# Patient Record
Sex: FEMALE | Race: BLACK OR AFRICAN AMERICAN | Employment: FULL TIME | URBAN - METROPOLITAN AREA
[De-identification: names, ages, dates, MRNs, and addresses within clinical notes are randomized per-mention and may not be internally consistent; named-entity substitution may affect disease eponyms.]

---

## 2021-08-24 ENCOUNTER — OFFICE VISIT (OUTPATIENT)
Dept: INTERNAL MEDICINE CLINIC | Age: 51
End: 2021-08-24
Payer: COMMERCIAL

## 2021-08-24 VITALS
SYSTOLIC BLOOD PRESSURE: 150 MMHG | TEMPERATURE: 96.5 F | HEART RATE: 89 BPM | DIASTOLIC BLOOD PRESSURE: 90 MMHG | RESPIRATION RATE: 20 BRPM | HEIGHT: 70 IN | BODY MASS INDEX: 41.95 KG/M2 | WEIGHT: 293 LBS | OXYGEN SATURATION: 97 %

## 2021-08-24 DIAGNOSIS — C50.912 MALIGNANT NEOPLASM OF LEFT BREAST IN FEMALE, ESTROGEN RECEPTOR NEGATIVE, UNSPECIFIED SITE OF BREAST (HCC): ICD-10-CM

## 2021-08-24 DIAGNOSIS — I10 ESSENTIAL HYPERTENSION: Primary | ICD-10-CM

## 2021-08-24 DIAGNOSIS — E66.01 MORBID OBESITY (HCC): ICD-10-CM

## 2021-08-24 DIAGNOSIS — Z17.1 MALIGNANT NEOPLASM OF LEFT BREAST IN FEMALE, ESTROGEN RECEPTOR NEGATIVE, UNSPECIFIED SITE OF BREAST (HCC): ICD-10-CM

## 2021-08-24 DIAGNOSIS — D56.9 THALASSEMIA, UNSPECIFIED TYPE: ICD-10-CM

## 2021-08-24 DIAGNOSIS — M79.7 FIBROMYALGIA: ICD-10-CM

## 2021-08-24 DIAGNOSIS — K58.1 IRRITABLE BOWEL SYNDROME WITH CONSTIPATION: ICD-10-CM

## 2021-08-24 DIAGNOSIS — K22.4 ESOPHAGEAL SPASM: ICD-10-CM

## 2021-08-24 DIAGNOSIS — M15.9 PRIMARY OSTEOARTHRITIS INVOLVING MULTIPLE JOINTS: ICD-10-CM

## 2021-08-24 PROCEDURE — 99204 OFFICE O/P NEW MOD 45 MIN: CPT | Performed by: INTERNAL MEDICINE

## 2021-08-24 RX ORDER — LANOLIN ALCOHOL/MO/W.PET/CERES
400 CREAM (GRAM) TOPICAL DAILY
COMMUNITY

## 2021-08-24 RX ORDER — CLOTRIMAZOLE AND BETAMETHASONE DIPROPIONATE 10; .64 MG/G; MG/G
CREAM TOPICAL AS NEEDED
COMMUNITY
End: 2022-10-05 | Stop reason: ALTCHOICE

## 2021-08-24 RX ORDER — LOSARTAN POTASSIUM AND HYDROCHLOROTHIAZIDE 12.5; 1 MG/1; MG/1
TABLET ORAL
COMMUNITY
End: 2022-04-04 | Stop reason: SDUPTHER

## 2021-08-24 RX ORDER — DULOXETIN HYDROCHLORIDE 60 MG/1
CAPSULE, DELAYED RELEASE ORAL
COMMUNITY
End: 2022-04-04 | Stop reason: SDUPTHER

## 2021-08-24 RX ORDER — CHOLECALCIFEROL TAB 125 MCG (5000 UNIT) 125 MCG
5000 TAB ORAL DAILY
COMMUNITY

## 2021-08-24 RX ORDER — DIAPER,BRIEF,INFANT-TODD,DISP
1 EACH MISCELLANEOUS DAILY
COMMUNITY

## 2021-08-24 RX ORDER — MULTIVITAMIN
1 CAPSULE ORAL DAILY
COMMUNITY

## 2021-08-24 RX ORDER — PROPYLENE GLYCOL 0.06 MG/ML
1 EMULSION OPHTHALMIC
COMMUNITY

## 2021-08-24 RX ORDER — MONTELUKAST SODIUM 10 MG/1
TABLET ORAL
COMMUNITY
End: 2022-02-03

## 2021-08-24 RX ORDER — LANOLIN ALCOHOL/MO/W.PET/CERES
100 CREAM (GRAM) TOPICAL DAILY
COMMUNITY

## 2021-08-24 RX ORDER — LORATADINE 10 MG/1
10 TABLET ORAL DAILY
COMMUNITY
End: 2022-10-05 | Stop reason: ALTCHOICE

## 2021-08-24 RX ORDER — AZELASTINE HYDROCHLORIDE, FLUTICASONE PROPIONATE 137; 50 UG/1; UG/1
1 SPRAY, METERED NASAL 2 TIMES DAILY
COMMUNITY
End: 2022-02-03

## 2021-08-24 RX ORDER — DOCUSATE SODIUM 100 MG/1
100 CAPSULE, LIQUID FILLED ORAL 2 TIMES DAILY
COMMUNITY
End: 2022-02-01 | Stop reason: ALTCHOICE

## 2021-08-24 NOTE — PROGRESS NOTES
1. Have you been to the ER, urgent care clinic since your last visit? Hospitalized since your last visit? No    2. Have you seen or consulted any other health care providers outside of the [address] [place] [address] since your last visit? Include any pap smears or colon screening. Yes    Health Maintenance Due   Topic Date Due    Hepatitis C Screening  Never done    PAP AKA CERVICAL CYTOLOGY  Never done    Lipid Screen  Never done    Colorectal Cancer Screening Combo  Never done    COVID-19 Vaccine (2 - Moderna 2-dose series) 04/16/2021     Previous PCP Dr. Gonzales [place], [place].

## 2021-08-24 NOTE — PROGRESS NOTES
HISTORY OF PRESENT ILLNESS  Maryan Guzman is a 53 y.o. female. HPI  New patient to our practice. Rashaun Mccoy's sister, Payal Mccoy's daughter. Zakiya Hebert - tends to have a mild anemia secondary to this. GERD and IBS - in 2010 had lap band surgery. Also had esophageal spasm - made the spasms worse. Has deflated the band but still with spasms and occ vomiting. Has an appointment for GI f/u 9/20 in [place]. Recently had episode when drinking water felt esophagus closed and vomited everywhere. previously had pH testing and motility testing which showed spasms. Fibromyalgia, arthritis, tennis elbow - had bone scan with breast cancer w/u. Showed arthritis in all joints. Has spinal stenosis in lumbar spine and SI joint pain, bursitis in right hip. Will take Excedrine prn. Stretching daily. Fibromyalgia diagnosed in 2017 - saw rheumatologist who stated she did not have RA. Placed on Cymbalta which has helped. Had tried gabapentin in the past but did  Not help and made her lethargic.     htn - elevated today. Does not check at home. Diagnosed 2003/2004. Tries to limit her salt intake. Doesn't take her meds every day. diverticulae with diverticultitis - last episode was 2018. Last colonoscopy was in 2017. Heart murmur - first id'ed 2 years ago. Had ECHO at that time at [facility] in [place]. Breast cancer - last treatment 4/2017,  Had surgery then chemo then radiation. Left breast triple negative. Last Mammogram was in December. Has been seeing an oncologist in [place] but hasn't established with anyone here - would like to continue seeing her specialists in [place]. Had been seeing med oncologist, surg oncologist and radiation oncologist.  No fam hx of breast cancer. Has resultant lymphedema in left arm. Saw Lymphedema clinic in [place].  Also mild in fir foot and ankle after previous ankle reconstruction. 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       Had thickened endometrial lining last year. Saw Gyn/Onc, had d/c with biopsy which was negative. Past Medical History:   Diagnosis Date    Hypertension      Past Surgical History:   Procedure Laterality Date    HX BARIATRIC SURGERY  10/2010    lap band    HX BREAST LUMPECTOMY Left 07/2016    HX DILATION AND CURETTAGE  11/2020    HX ORTHOPAEDIC  10/2014    right foot and ankle reconstruction for flat feet    HX ORTHOPAEDIC Bilateral 2008    knee arthroscopy    HX ORTHOPAEDIC Left 2006    heal spur surgery    HX TONSIL AND ADENOIDECTOMY       Allergies   Allergen Reactions    Other Food Sneezing    Adhesive Other (comments)     Tears skin off. Severe.  Oxycodone Itching    Lisinopril Other (comments)     \"constant tickle in my throat\"  \"constant tickle in my throat\"      Mold Other (comments)    Nickel Other (comments)    Sulfa (Sulfonamide Antibiotics) Other (comments)    Sulfamethoxazole-Trimethoprim Hives    Anesthetics - Amide Type - Select Amino Amides Nausea Only       Current Outpatient Medications:     propylene glycoL (Systane Balance) 0.6 % drop, Apply 1 Drop to eye as needed. , Disp: , Rfl:     biotin 10,000 mcg cap, Take 1 Tablet by mouth daily. , Disp: , Rfl:     cholecalciferol (VITAMIN D3) (5000 Units/125 mcg) tab tablet, Take 5,000 Units by mouth daily. , Disp: , Rfl:     clotrimazole-betamethasone (LOTRISONE) topical cream, Apply  to affected area as needed. , Disp: , Rfl:     cyanocobalamin 1,000 mcg tablet, Take 100 mcg by mouth daily. , Disp: , Rfl:     Dextrin 3 gram/3.5 gram powd, Take  by mouth as needed. , Disp: , Rfl:     docusate sodium (COLACE) 100 mg capsule, Take 100 mg by mouth two (2) times a day., Disp: , Rfl:     DULoxetine (CYMBALTA) 60 mg capsule, duloxetine 60 mg capsule,delayed release  Take 1 capsule every day by oral route., Disp: , Rfl:     folic acid 895 mcg tablet, Take 400 mcg by mouth daily. , Disp: , Rfl:     loratadine (CLARITIN) 10 mg tablet, Take 10 mg by mouth., Disp: , Rfl:     losartan-hydroCHLOROthiazide (HYZAAR) 100-12.5 mg per tablet, losartan 100 mg-hydrochlorothiazide 12.5 mg tablet  Take 1 tablet every day by oral route., Disp: , Rfl:     montelukast (SINGULAIR) 10 mg tablet, montelukast 10 mg tablet  TAKE 1 TABLET BY MOUTH EVERY DAY AT NIGHT, Disp: , Rfl:     multivitamin capsule, Take 1 Capsule by mouth daily. , Disp: , Rfl:     azelastine-fluticasone (Dymista) 137-50 mcg/spray spry, 1 Spray by Nasal route two (2) times a day., Disp: , Rfl:   Family History   Problem Relation Age of Onset    Arthritis-osteo Mother     Heart Disease Father         chf    Cancer Father         lung    Hypertension Father     Arthritis-rheumatoid Brother     Hypertension Brother     Hypertension Brother     Elevated Lipids Brother     Arthritis-osteo Brother         Social hx reviewed and updated in chart  Review of Systems   Constitutional: Negative. HENT: Negative. Eyes: Negative. Respiratory: Negative. Cardiovascular: Negative. Gastrointestinal: Positive for abdominal pain, constipation, heartburn, nausea and vomiting. Negative for blood in stool, diarrhea and melena. Esoph spasms   Genitourinary: Negative. Musculoskeletal: Positive for back pain and joint pain. Negative for falls, myalgias and neck pain. Skin: Negative. Neurological: Positive for tingling (feet post chemo). Negative for dizziness, tremors, sensory change, speech change, focal weakness, seizures, weakness and headaches. Endo/Heme/Allergies: Positive for environmental allergies. Negative for polydipsia. Does not bruise/bleed easily. Psychiatric/Behavioral: Negative. Visit Vitals  BP (!) 150/90   Pulse 89   Temp (!) 96.5 °F (35.8 °C) (Temporal)   Resp 20   Ht 5' 9.5\" (1.765 m)   Wt 341 lb (154.7 kg)   SpO2 97%   BMI 49.63 kg/m²     Physical Exam  Constitutional:       Appearance: Normal appearance. She is obese.    HENT:      Head: Normocephalic and atraumatic. Eyes:      Extraocular Movements: Extraocular movements intact. Conjunctiva/sclera: Conjunctivae normal.      Pupils: Pupils are equal, round, and reactive to light. Neck:      Vascular: No carotid bruit. Comments: No thyromegaly  Cardiovascular:      Rate and Rhythm: Normal rate and regular rhythm. Pulses: Normal pulses. Heart sounds: Normal heart sounds. Pulmonary:      Effort: Pulmonary effort is normal.      Breath sounds: Normal breath sounds. Abdominal:      General: Bowel sounds are normal. There is no distension. Palpations: Abdomen is soft. There is no mass. Tenderness: There is no abdominal tenderness. Musculoskeletal:         General: No tenderness. Normal range of motion. Cervical back: Neck supple. Right lower leg: Edema (right ankle and foot) present. Left lower leg: No edema. Comments: Mild swelling left arm   Lymphadenopathy:      Cervical: No cervical adenopathy. Skin:     General: Skin is warm and dry. Findings: No rash. Neurological:      General: No focal deficit present. Mental Status: She is alert and oriented to person, place, and time. Psychiatric:         Mood and Affect: Mood normal.         Behavior: Behavior normal.         Thought Content: Thought content normal.         ASSESSMENT and PLAN  New patient with to or practice. Diagnoses and all orders for this visit:    1. Essential hypertension - elevated today. Will have her check BP at home and f/u in 5 weeks once she moves to [place]. Improve diet, decrease salt, increase exercise    2. Thalassemia, unspecified type - mild anemia per patient. 3. Esophageal spasm - has upcoing eval with GI.      4. Irritable bowel syndrome with constipation - constipation controlled with current medications.       5. Malignant neoplasm of left breast in female, estrogen receptor negative, unspecified site of breast ([facility] [address] - close to 5 years from last treatment. Will continue to follow up with oncology team in [place]    6. Fibromyalgia - continue Cymbalta and stretchng    7. Primary osteoarthritis involving multiple joints - dscussed weight loss for improvement    8. Morbid obesity - discussed diet and exercise for weight loss. Will obtain records from previous PCP. Follow-up and Dispositions    · Return in about 6 weeks (around 10/5/2021) for htn.

## 2021-10-06 ENCOUNTER — OFFICE VISIT (OUTPATIENT)
Dept: INTERNAL MEDICINE CLINIC | Age: 51
End: 2021-10-06
Payer: COMMERCIAL

## 2021-10-06 VITALS
WEIGHT: 293 LBS | DIASTOLIC BLOOD PRESSURE: 91 MMHG | BODY MASS INDEX: 41.95 KG/M2 | TEMPERATURE: 98.3 F | OXYGEN SATURATION: 98 % | HEART RATE: 85 BPM | SYSTOLIC BLOOD PRESSURE: 148 MMHG | RESPIRATION RATE: 14 BRPM | HEIGHT: 70 IN

## 2021-10-06 DIAGNOSIS — D22.9 ATYPICAL MOLE: ICD-10-CM

## 2021-10-06 DIAGNOSIS — J30.89 NON-SEASONAL ALLERGIC RHINITIS, UNSPECIFIED TRIGGER: ICD-10-CM

## 2021-10-06 DIAGNOSIS — C50.912 MALIGNANT NEOPLASM OF LEFT BREAST IN FEMALE, ESTROGEN RECEPTOR NEGATIVE, UNSPECIFIED SITE OF BREAST (HCC): ICD-10-CM

## 2021-10-06 DIAGNOSIS — Z17.1 MALIGNANT NEOPLASM OF LEFT BREAST IN FEMALE, ESTROGEN RECEPTOR NEGATIVE, UNSPECIFIED SITE OF BREAST (HCC): ICD-10-CM

## 2021-10-06 DIAGNOSIS — M48.061 SPINAL STENOSIS OF LUMBAR REGION, UNSPECIFIED WHETHER NEUROGENIC CLAUDICATION PRESENT: ICD-10-CM

## 2021-10-06 DIAGNOSIS — K58.1 IRRITABLE BOWEL SYNDROME WITH CONSTIPATION: ICD-10-CM

## 2021-10-06 DIAGNOSIS — K22.4 ESOPHAGEAL SPASM: ICD-10-CM

## 2021-10-06 DIAGNOSIS — I10 ESSENTIAL HYPERTENSION: Primary | ICD-10-CM

## 2021-10-06 PROCEDURE — 99214 OFFICE O/P EST MOD 30 MIN: CPT | Performed by: INTERNAL MEDICINE

## 2021-10-06 NOTE — PROGRESS NOTES
Reviewed record in preparation for visit and have obtained necessary documentation. Identified pt with two pt identifiers(name and ). Chief Complaint   Patient presents with    Follow-up     Blood pressure (!) 148/91, pulse 85, temperature 98.3 °F (36.8 °C), temperature source Temporal, resp. rate 14, height 5' 9.5\" (1.765 m), weight 338 lb 3.2 oz (153.4 kg), SpO2 98 %. Health Maintenance Due   Topic Date Due    Hepatitis C Test  Never done    Cervical cancer screen  Never done    Mammogram  Never done    Cholesterol Test   Never done    Colorectal Screening  Never done    Yearly Flu Vaccine (1) 2021    Shingles Vaccine (1 of 2) Never done       Ms. Taco Rich has a reminder for a \"due or due soon\" health maintenance. I have asked that she discuss this further with her primary care provider for follow-up on this health maintenance. Coordination of Care Questionnaire:  :     1) Have you been to an emergency room, urgent care clinic since your last visit? no   Hospitalized since your last visit? no             2) Have you seen or consulted any other health care providers outside of [address] [place] [address] since your last visit? no  (Include any pap smears or colon screenings in this section.)    3) In the event something were to happen to you and you were unable to speak on your behalf, do you have an Advance Directive/ Living Will in place stating your wishes?  NO

## 2021-10-06 NOTE — PROGRESS NOTES
Subjective:     Tre Mckee is a 49 y.o. female who presents for follow up of hypertension. Seen as new patient last month. BP elevated but was new patient visit. Was to work on diet and exercise and monitor BP at home. Missing doses of medication. Missed Monday and Tuesday, took today approx 1 hour prior to coming to the appointment. Blames moving. Diet and Lifestyle: not watching diet and not exercising regularly. Home BP Monitoring: is not measured at home    Cardiovascular ROS: taking medications as instructed, no medication side effects noted, no TIA's, no chest pain on exertion, no dyspnea on exertion, no swelling of ankles, no orthostatic dizziness or lightheadedness, no palpitations. New concerns:   Did not take her Excedrine today but joints are inflamed with increased pain. Given allergy serum by allergies. Needs to establish with allergist.  Has RN willing to admin until sees new allergies. Was to have another motility study prior to moving from . Needs to establish here. Had been seeing ortho for injections for her lumbar spinal stenosis and SI joint issues. Needs to establish here. Hx of breast cancer - needs to establish with med oncologist.      Current Outpatient Medications   Medication Sig Dispense Refill    syringe with needle 1 mL 22 gauge x 1 1/2\" syrg 1 Each by Does Not Apply route every seven (7) days. For allergy injections 15 Each 0    propylene glycoL (Systane Balance) 0.6 % drop Apply 1 Drop to eye as needed.  biotin 10,000 mcg cap Take 1 Tablet by mouth daily.  cholecalciferol (VITAMIN D3) (5000 Units/125 mcg) tab tablet Take 5,000 Units by mouth daily.  clotrimazole-betamethasone (LOTRISONE) topical cream Apply  to affected area as needed.  cyanocobalamin 1,000 mcg tablet Take 100 mcg by mouth daily.  Dextrin 3 gram/3.5 gram powd Take  by mouth as needed.       docusate sodium (COLACE) 100 mg capsule Take 100 mg by mouth two (2) times a day.  DULoxetine (CYMBALTA) 60 mg capsule duloxetine 60 mg capsule,delayed release   Take 1 capsule every day by oral route.  folic acid 675 mcg tablet Take 400 mcg by mouth daily.  loratadine (CLARITIN) 10 mg tablet Take 10 mg by mouth.  losartan-hydroCHLOROthiazide (HYZAAR) 100-12.5 mg per tablet losartan 100 mg-hydrochlorothiazide 12.5 mg tablet   Take 1 tablet every day by oral route.  montelukast (SINGULAIR) 10 mg tablet montelukast 10 mg tablet   TAKE 1 TABLET BY MOUTH EVERY DAY AT NIGHT      multivitamin capsule Take 1 Capsule by mouth daily.  azelastine-fluticasone (Dymista) 137-50 mcg/spray spry 1 Spray by Nasal route two (2) times a day. No current lab results    Review of Systems, additional:  Pertinent items are noted in HPI. Objective:     Visit Vitals  BP (!) 148/91 (BP 1 Location: Right arm, BP Patient Position: Sitting, BP Cuff Size: Thigh)   Pulse 85   Temp 98.3 °F (36.8 °C) (Temporal)   Resp 14   Ht 5' 9.5\" (1.765 m)   Wt 338 lb 3.2 oz (153.4 kg)   SpO2 98%   BMI 49.23 kg/m²     Appearance: alert, well appearing, and in no distress and oriented to person, place, and time. General exam:   . NECK: supple, no lad, no bruit, no tm  LUNGS: cta bilat  CV rrr, no m/g/r  ABD: soft, nt, nd, nabs  EXT: no c/c/e      Assessment/Plan:     Diagnoses and all orders for this visit:    1. Essential hypertension - cannot assess effectiveness of medications as has missed several doses. Discussed techniques to remember meds. Continue same medicaitons for now. 2. Non-seasonal allergic rhinitis, unspecified trigger - prev allergist ok with RN friend administering immunotx until establishes with allergist in [facility]   -     syringe with needle 1 mL 22 gauge x 1 1/2\" syrg; 1 Each by Does Not Apply route every seven (7) days. For allergy injections    3.  Esophageal  Spasm - middle of work up with GI in [place].    -     REFERRAL TO GASTROENTEROLOGY    4. Irritable bowel syndrome with constipation  -     REFERRAL TO GASTROENTEROLOGY    5. Malignant neoplasm of left breast in female, estrogen receptor negative, unspecified site of breast ([facility] [address] -     REFERRAL TO HEMATOLOGY ONCOLOGY    6. Spinal stenosis of lumbar region, unspecified whether neurogenic claudication present - has had SI joint injections in past  -     REFERRAL TO ORTHOPEDICS    7. Atypical mole  -     REFERRAL TO DERMATOLOGY            Follow-up and Dispositions    · Return in about 4 weeks (around 11/3/2021) for htn.

## 2021-10-07 ENCOUNTER — TELEPHONE (OUTPATIENT)
Dept: INTERNAL MEDICINE CLINIC | Age: 51
End: 2021-10-07

## 2021-10-20 ENCOUNTER — OFFICE VISIT (OUTPATIENT)
Dept: ONCOLOGY | Age: 51
End: 2021-10-20
Payer: COMMERCIAL

## 2021-10-20 ENCOUNTER — DOCUMENTATION ONLY (OUTPATIENT)
Dept: ONCOLOGY | Age: 51
End: 2021-10-20

## 2021-10-20 VITALS
HEART RATE: 89 BPM | HEIGHT: 69 IN | BODY MASS INDEX: 43.4 KG/M2 | WEIGHT: 293 LBS | SYSTOLIC BLOOD PRESSURE: 153 MMHG | TEMPERATURE: 96.2 F | OXYGEN SATURATION: 97 % | DIASTOLIC BLOOD PRESSURE: 87 MMHG

## 2021-10-20 DIAGNOSIS — R79.89 LOW VITAMIN D LEVEL: ICD-10-CM

## 2021-10-20 DIAGNOSIS — D56.9 THALASSEMIA, UNSPECIFIED TYPE: ICD-10-CM

## 2021-10-20 DIAGNOSIS — M79.7 FIBROMYALGIA: ICD-10-CM

## 2021-10-20 DIAGNOSIS — Z85.3 HISTORY OF BREAST CANCER: Primary | ICD-10-CM

## 2021-10-20 DIAGNOSIS — E78.9 BORDERLINE HIGH CHOLESTEROL: ICD-10-CM

## 2021-10-20 DIAGNOSIS — I10 PRIMARY HYPERTENSION: ICD-10-CM

## 2021-10-20 DIAGNOSIS — K57.90 DIVERTICULOSIS: ICD-10-CM

## 2021-10-20 DIAGNOSIS — Z92.3 HISTORY OF THERAPEUTIC RADIATION: ICD-10-CM

## 2021-10-20 DIAGNOSIS — Z98.890 HISTORY OF LUMPECTOMY OF LEFT BREAST: ICD-10-CM

## 2021-10-20 PROCEDURE — 99204 OFFICE O/P NEW MOD 45 MIN: CPT | Performed by: INTERNAL MEDICINE

## 2021-10-20 NOTE — PROGRESS NOTES
Oncology Social Work  Psychosocial Assessment    Reason for Assessment:      [] Social Work Referral [x] Initial Assessment [] New Diagnosis [] Other    Advance Care Planning:  Patient would like to update her AMD.      Sources of Information:    [x]Patient  []Family  []Staff  []Medical Record     Mental Status:    [x]Alert  []Lethargic  []Unresponsive   [] Unable to assess   Oriented to:  [x]Person  [x]Place  [x]Time  [x]Situation      Relationship Status:  [x]Single  []  []Significant Other/Life Partner  []  []  []    Living Circumstances:  [x]Lives Alone  []Family/Significant Other in Household  []Roommates  []Children in the Home  []Paid Caregivers  []Assisted Living Facility/Group Home  []Skilled [address]  []Homeless  []Incarcerated  []Environmental/Care Concerns  []Other:    Employment Status:  [x]Employed Full-time []Employed Part-time []Homemaker  [] Retired [] Short-Term Disability [] [facility]  [] Unemployed     Barriers to Learning:    []Language  []Developmental  []Cognitive  []Altered Mental Status  []Visual/Hearing Impairment  []Unable to Read/Write  []Motivational  [] Challenges Understanding Medical Jargon [x]No Barriers Identified      Financial/Legal Concerns:    []Uninsured  []Limited Income/Resources  []Non-Citizen  []Food Insecurity [x]No Concerns Identified   []Other:    Oriental orthodox/Spiritual/Existential:  Does patient have any spiritual or Sabianism beliefs? [] Yes [] No  Is the patient involved in a spiritual, eloise or Sabianism community? [] Yes [] No  Patient expressing spiritual/existential angst? [] Yes [x] No  Notes: Patient did not express any spiritual or Sabianism preferences at this time.       Support System:    Identified Support Person/Group:  [x]Strong  []Fair  []Limited    Coping with Illness:   [x]  Coping Well  [] Challenges Coping with Serious Illness [] Situational Depression [] Situational Anxiety [] Anticipatory Grief  [] Recent Loss [] Caregiver Arley            Narrative:   Met with the patient during her initial office visit today. Patient reports a history of triple negative breast cancer, with lumpectomy, AC-T chemotherapy, and XRT in 2016. Patient has a PCP - Dr. Donna Rivas. Living Situation - The patient lives alone with her two cats, \"Heena Álvarez\" and \"Tamera. \"  She recently relocated from [place], to be closer to family. Employment Status - The patient is an . 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 Insurance -  [facility] [employer] PPO    Social Support - The patient's Mom, siblings, nieces, and nephews all live in [place], [place] and are supportive. Resources provided -  The patient suffers from lymphedema, and plans to connect with the [facility]. The patient was seeing a [place] [address] provider in [place], and plans to connect with this type of specialist in [place], [place]. The patient would like to update her AMD, and complete Financial POA paperwork as well. Provided information for [facility], explaining that they offer legal and financial services to cancer patients that meet financial eligibility. Sent a referral to [facility] on the patient's behalf, with her permission. Provided this 's contact information as well as information regarding the complementary services provided by the [facility], explaining that the center is currently closed due to COVID-19 restrictions. Explained that meditation, yoga, relationship counseling, and music therapy, are being offered virtually. Plan:   1. Introduced self and role of this  in the [employer]. 2.  Informed the patient of the [facility] and available resources there. 3.  Continue to meet with the patient when she returns to the clinic for ongoing assessment of the patients adjustment to her diagnosis and treatment.     4.  Ongoing psychosocial support as desired by patient.       Referral:   Complementary therapies referral  Legal referral  Lymphedema referral  Orin Whitehead LCSW

## 2021-10-20 NOTE — PROGRESS NOTES
[facility] [place] [address] Debbie Estescent, [address] [place] [address] Road, [facility] 66: [phone]  F: [phone]    Reason for Visit:   Becca Spence is a 49 y.o. female who is seen in consultation at the request of Dr. Rodney Short  for evaluation of history of breast cancer. Treatment History:   Breast biopsy  Lumpectomy 2016      STAGE: 3 by report triple negative    History of Present Illness:     Pt seen today for office consult for triple breast cancer dx 2016 in [place] at [facility]. Hx of lump/ AC-T chemo / XRT. No records here. Need records. Last mammo 12/20 at [facility] [place] and good. Hx of diverticulitis/ HTN/ fibromyalgia/ IBS/ spinal stenosis. Hx of bone scan / spine imaging. Has lymphedema and uses pump. No cancer recurrence. Pt works as . From [employer] and moved back here.    No fevers/ chills/ chest pain/ SOB/ nausea/ vomiting/diarrhea/         Past Medical History:   Diagnosis Date    Breast cancer ([facility]     Diverticulitis     Diverticulosis     Esophageal spasm     Fibromyalgia     Hypertension     IBS (irritable bowel syndrome)     Morbid obesity ([facility]     Osteoarthritis     Thalassemia       Past Surgical History:   Procedure Laterality Date    HX BARIATRIC SURGERY  10/2010    lap band    HX BREAST LUMPECTOMY Left 07/2016    HX DILATION AND CURETTAGE  11/2020    HX ORTHOPAEDIC  10/2014    right foot and ankle reconstruction for flat feet    HX ORTHOPAEDIC Bilateral 2008    knee arthroscopy    HX ORTHOPAEDIC Left 2006    heal spur surgery    HX TONSIL AND ADENOIDECTOMY        Social History     Tobacco Use    Smoking status: Never Smoker    Smokeless tobacco: Never Used   Substance Use Topics    Alcohol use: Yes     Comment: 1 time a year      Family History   Problem Relation Age of Onset    Arthritis-osteo Mother     Heart Disease Father         chf    Cancer Father         lung    Hypertension Father     Arthritis-rheumatoid Brother     Hypertension Brother     Hypertension Brother     Elevated Lipids Brother     Arthritis-osteo Brother      Current Outpatient Medications   Medication Sig    OTHER daily. Black Cohosh    syringe with needle 1 mL 22 gauge x 1 1/2\" syrg 1 Each by Does Not Apply route every seven (7) days. For allergy injections    propylene glycoL (Systane Balance) 0.6 % drop Apply 1 Drop to eye as needed.  biotin 10,000 mcg cap Take 1 Tablet by mouth daily.  cholecalciferol (VITAMIN D3) (5000 Units/125 mcg) tab tablet Take 5,000 Units by mouth daily.  clotrimazole-betamethasone (LOTRISONE) topical cream Apply  to affected area as needed.  cyanocobalamin 1,000 mcg tablet Take 100 mcg by mouth daily.  Dextrin 3 gram/3.5 gram powd Take  by mouth as needed.  docusate sodium (COLACE) 100 mg capsule Take 100 mg by mouth two (2) times a day.  DULoxetine (CYMBALTA) 60 mg capsule duloxetine 60 mg capsule,delayed release   Take 1 capsule every day by oral route.  folic acid 345 mcg tablet Take 400 mcg by mouth daily.  loratadine (CLARITIN) 10 mg tablet Take 10 mg by mouth.  losartan-hydroCHLOROthiazide (HYZAAR) 100-12.5 mg per tablet losartan 100 mg-hydrochlorothiazide 12.5 mg tablet   Take 1 tablet every day by oral route.  montelukast (SINGULAIR) 10 mg tablet montelukast 10 mg tablet   TAKE 1 TABLET BY MOUTH EVERY DAY AT NIGHT    multivitamin capsule Take 1 Capsule by mouth daily.  azelastine-fluticasone (Dymista) 137-50 mcg/spray spry 1 Spray by Nasal route two (2) times a day. No current facility-administered medications for this visit. Allergies   Allergen Reactions    Other Food Sneezing    Adhesive Other (comments)     Tears skin off. Severe.       Oxycodone Itching    Lisinopril Other (comments)     \"constant tickle in my throat\"  \"constant tickle in my throat\"      Mold Other (comments)    Nickel Other (comments)    Sulfa (Sulfonamide Antibiotics) Other (comments)    Sulfamethoxazole-Trimethoprim Hives    Anesthetics - Amide Type - Select Amino Amides Nausea Only        A complete review of systems was obtained, negative except as described above and as reported on ROS sheet scanned into system. Physical Exam:     Visit Vitals  BP (!) 153/87 (BP 1 Location: Right arm, BP Patient Position: Sitting)   Pulse 89   Temp (!) 96.2 °F (35.7 °C) (Temporal)   Ht 5' 9\" (1.753 m)   Wt 338 lb 6.4 oz (153.5 kg)   SpO2 97%   BMI 49.97 kg/m²     ECOG PS: 0  General: No distress  Eyes: PERRLA, anicteric sclerae  HENT: Atraumatic, wearing mask  Neck: Supple  Respiratory: CTAB, normal respiratory effort  CV: Normal rate, regular rhythm, no murmurs, no peripheral edema  GI: Soft, nontender, nondistended, no masses  MS: Normal gait and station. Digits without clubbing or cyanosis. Skin: No rashes, ecchymoses, or petechiae. Normal temperature, turgor, and texture. Psych: Alert, oriented, appropriate affect, normal judgment/insight  Neuro non focal  Breast LEFT lumpectomy scar, no masses palpable b/l     Results:   No results found for: WBC, HGB, HCT, PLT, MCV, ANEU, HGBPOC, HCTPOC, HGBEXT, HCTEXT, PLTEXT, HGBEXT, HCTEXT, PLTEXT  No results found for: NA, K, CL, CO2, GLU, BUN, CREA, GFRAA, GFRNA, CA, NAPOC, KPOCT, CLPOC, GLUCPOC, IBUN, CREAPOC, ICAI  No results found for: TBILI, ALT, AP, TP, ALB, GLOB      Records reviewed and summarized above. Pathology report(s) reviewed above. Radiology report(s) reviewed above. Assessment:/PLAN     1)  Reported stage 3 LEFT breast cancer triple negative dx in 2016  hx of lump/ chemo AC-T/ radiation in [place]   history reviewed with pt today. Need records from [place].   Will get. Dx in 2016. Not on any therapy now. Doing well without cancer recurrence. Last mammo 12/20 in [place]. Ordered for this year. Has not had labs recently and ordered these. Hx of lymphedema and will refer to LE clinic.    Needs referral to GYN and this done today.   Will continue course of surveillance. Discussed body scans and pt reports having bone scan in past negative. SW support today. 2) post menopausal.  amenorrhic since chemo but still having some vaginal bleeding. Hx of D+C/ fu GYN. Needs referral and ordered today. 3) Hx of diverticulitis/ HTN/ fibromyalgia/ IBS/ spinal stenosis. Hx of bone scan / spine imaging. Per PCP. 4) hot flashes. Taking black cohosh which is helping. 5) lymphedema. Has seen LE clinic in past and will refer here. 6) hx thalassemia. Hx of anemia. Check labs. 7) Psychosocial.  Mood good. Coping well. Works as an . Just moved back to [facility]. Has family support here. SW support today. Fu here in 6 months  Call if questions    I appreciate the opportunity to participate in Ms. Sergio Mccoy's care.     Signed By: Sandra Worthington, DO

## 2021-10-20 NOTE — PROGRESS NOTES
Addie Jimenez is a 49 y.o. female  Chief Complaint   Patient presents with    New Patient    Breast Cancer     1. Have you been to the ER, urgent care clinic since your last visit? Hospitalized since your last visit? No.  2. Have you seen or consulted any other health care providers outside of the [address] [place] [address] since your last visit? Include any pap smears or colon screening.  Yes, patient went to see Gurvinder Perez (Medical Oncologist) and PCP Jasmina Vera

## 2021-10-25 NOTE — PROGRESS NOTES
New Patient, Referred by Dr. Severa Rumpf for PMB, pt stats she is having back pain that is an 8/10 on pain scale, she states pain is constant and has had for years, she also reports vaginal bleeding, she states she had a normal cycle last week, she states she went into menopause in 2018    Vitals:    10/26/21 1321 10/26/21 1329   BP: (!) 130/104 (!) 147/101   BP 1 Location: Right arm Left arm   BP Patient Position: Sitting Sitting   Pulse: 79 77   Height: 5' 9\" (1.753 m)    Weight: 337 lb 3.2 oz (153 kg)      Pt states she has not taken her blood pressure medication since Friday, will take medicine and take blood pressures at home, if she continues to have high readings will contact PCP    1. Have you been to the ER, urgent care clinic since your last visit? Hospitalized since your last visit?  no    2. Have you seen or consulted any other health care providers outside of the [address] [place] [address] since your last visit? Include any pap smears or colon screening.   no

## 2021-10-26 ENCOUNTER — OFFICE VISIT (OUTPATIENT)
Dept: GYNECOLOGY | Age: 51
End: 2021-10-26
Payer: COMMERCIAL

## 2021-10-26 VITALS
HEART RATE: 77 BPM | DIASTOLIC BLOOD PRESSURE: 101 MMHG | BODY MASS INDEX: 43.4 KG/M2 | WEIGHT: 293 LBS | SYSTOLIC BLOOD PRESSURE: 147 MMHG | HEIGHT: 69 IN

## 2021-10-26 DIAGNOSIS — Z17.1 MALIGNANT NEOPLASM OF LEFT BREAST IN FEMALE, ESTROGEN RECEPTOR NEGATIVE, UNSPECIFIED SITE OF BREAST (HCC): ICD-10-CM

## 2021-10-26 DIAGNOSIS — E66.01 MORBID OBESITY (HCC): ICD-10-CM

## 2021-10-26 DIAGNOSIS — C50.912 MALIGNANT NEOPLASM OF LEFT BREAST IN FEMALE, ESTROGEN RECEPTOR NEGATIVE, UNSPECIFIED SITE OF BREAST (HCC): ICD-10-CM

## 2021-10-26 DIAGNOSIS — N95.0 POSTMENOPAUSAL BLEEDING: Primary | ICD-10-CM

## 2021-10-26 DIAGNOSIS — N95.0 PMB (POSTMENOPAUSAL BLEEDING): ICD-10-CM

## 2021-10-26 PROCEDURE — 99204 OFFICE O/P NEW MOD 45 MIN: CPT | Performed by: OBSTETRICS & GYNECOLOGY

## 2021-10-26 NOTE — LETTER
11/2/2021    Patient: Zeb Alberts   YOB: 1970   Date of Visit: 10/26/2021     Chris Watson MD  [facility] [place] [address]  [address], [address] [place] [address] [place] [place] [address]    Dear MD Jackie Zhang More, DO,      Thank you for referring Ms. Zeb Alberts to [facility] for evaluation. My notes for this consultation are attached. If you have questions, please do not hesitate to call me. I look forward to following your patient along with you.       Sincerely,    Jose Luis Irving MD

## 2021-10-26 NOTE — PROGRESS NOTES
[address] [place] [address] [address], [address] [facility]  P [phone]  F [phone]    Office Note  Patient ID:  Name:  Savanah Iqbal  MRN:  [id number]  :  1970/51 y.o. Date:  2021      HISTORY OF PRESENT ILLNESS:  Ms. Savanah Iqbal is a 49 y.o.  postmenopausal female who presents as a new patient from Dr. Tanmay Crabtree for vaginal bleeding/spotting. The patient has a history of triple negative breast cancer diagnosed in 2016 s/p lumpectomy/AC-T chemo and radiation. The patient reports menopause a few years ago while undergoing treatment for her breast cancer. She presented last year with vaginal spotting/bleeding and underwent a hysteroscopy/D&C with Dr. Ken Gonzalez, Gynecologic Oncology in [place], [place]. Her pathology was consistent with \"focally disordered proliferative endometrium. Benign endocervical mucosa. \"     She is now followed by Dr. Tanmay Crabtree for her breast cancer. She is referred to our office for continued vaginal spotting/bleeding. Pertinent PMH/PSH: breast cancer, HTN, obesity, IBS      Active, no restrictions. Pathology Review:   2020:  A. ECC: benign endocervical mucosa with chronic inflammation  B. Endometrium: focally disordered proliferative endometrium. Benign endocervical mucosa. ROS:  A comprehensive review of systems was negative except for that written in the History of Present Illness.  , 10 point ROS      ECOG rdGrdrrdarddrderd:rd rd3rd Problem List:  Patient Active Problem List    Diagnosis Date Noted    PMB (postmenopausal bleeding) 10/26/2021    Thalassemia     Hypertension     IBS (irritable bowel syndrome)     Esophageal spasm     Morbid obesity ([address]     Diverticulosis     Diverticulitis     Fibromyalgia     Osteoarthritis     Breast cancer ([facility]      PMH:  Past Medical History:   Diagnosis Date    Breast cancer ([facility]     Diverticulitis     Diverticulosis     Esophageal spasm     Fibromyalgia     Hypertension     IBS (irritable bowel syndrome)     Morbid obesity (HCC)     Osteoarthritis     Thalassemia       PSH:  Past Surgical History:   Procedure Laterality Date    HX BARIATRIC SURGERY  10/2010    lap band    HX BREAST LUMPECTOMY Left 07/2016    HX DILATION AND CURETTAGE  11/2020    HX ORTHOPAEDIC  10/2014    right foot and ankle reconstruction for flat feet    HX ORTHOPAEDIC Bilateral 2008    knee arthroscopy    HX ORTHOPAEDIC Left 2006    heal spur surgery    HX TONSIL AND ADENOIDECTOMY        Social History:  Social History     Tobacco Use    Smoking status: Never Smoker    Smokeless tobacco: Never Used   Substance Use Topics    Alcohol use: Yes     Comment: 1 time a year      Family History:  Family History   Problem Relation Age of Onset    Arthritis-osteo Mother     Heart Disease Father         chf    Cancer Father         lung    Hypertension Father     Arthritis-rheumatoid Brother     Hypertension Brother     Hypertension Brother     Elevated Lipids Brother     Arthritis-osteo Brother       Medications: (reviewed)  Current Outpatient Medications   Medication Sig    OTHER daily. Black Cohosh    propylene glycoL (Systane Balance) 0.6 % drop Apply 1 Drop to eye as needed.  biotin 10,000 mcg cap Take 1 Tablet by mouth daily.  cholecalciferol (VITAMIN D3) (5000 Units/125 mcg) tab tablet Take 5,000 Units by mouth daily.  clotrimazole-betamethasone (LOTRISONE) topical cream Apply  to affected area as needed.  cyanocobalamin 1,000 mcg tablet Take 100 mcg by mouth daily.  Dextrin 3 gram/3.5 gram powd Take  by mouth as needed.  DULoxetine (CYMBALTA) 60 mg capsule duloxetine 60 mg capsule,delayed release   Take 1 capsule every day by oral route.  folic acid 877 mcg tablet Take 400 mcg by mouth daily.  loratadine (CLARITIN) 10 mg tablet Take 10 mg by mouth.     losartan-hydroCHLOROthiazide (HYZAAR) 100-12.5 mg per tablet losartan 100 mg-hydrochlorothiazide 12.5 mg tablet   Take 1 tablet every day by oral route.  montelukast (SINGULAIR) 10 mg tablet montelukast 10 mg tablet   TAKE 1 TABLET BY MOUTH EVERY DAY AT NIGHT    multivitamin capsule Take 1 Capsule by mouth daily.  syringe with needle 1 mL 22 gauge x 1 1/2\" syrg 1 Each by Does Not Apply route every seven (7) days. For allergy injections (Patient not taking: Reported on 10/26/2021)    docusate sodium (COLACE) 100 mg capsule Take 100 mg by mouth two (2) times a day. (Patient not taking: Reported on 10/26/2021)    azelastine-fluticasone (Dymista) 137-50 mcg/spray spry 1 Spray by Nasal route two (2) times a day. (Patient not taking: Reported on 10/26/2021)     No current facility-administered medications for this visit. Allergies: (reviewed)  Allergies   Allergen Reactions    Other Food Sneezing    Adhesive Other (comments)     Tears skin off. Severe.  Oxycodone Itching    Lisinopril Other (comments)     \"constant tickle in my throat\"  \"constant tickle in my throat\"      Mold Other (comments)    Nickel Other (comments)    Sulfa (Sulfonamide Antibiotics) Other (comments)    Sulfamethoxazole-Trimethoprim Hives    Anesthetics - Amide Type - Select Amino Amides Nausea Only        OBJECTIVE:    Physical Exam:  VITAL SIGNS: Vitals:    10/26/21 1321 10/26/21 1329   BP: (!) 130/104 (!) 147/101   Pulse: 79 77   Weight: 337 lb 3.2 oz (153 kg)    Height: 5' 9\" (1.753 m)      Body mass index is 49.8 kg/m². GENERAL GEOVANNI: Conversant, alert, oriented. No acute distress. HEENT: HEENT. No thyroid enlargement. No JVD. Neck: Supple without restrictions. RESPIRATORY: Clear to auscultation and percussion to the bases. No CVAT. CARDIOVASC: RRR without murmur/rub. GASTROINT: soft, non-tender, without masses or organomegaly, obese   MUSCULOSKEL: no joint tenderness, deformity or swelling       EXTREMITIES: extremities normal, atraumatic, no cyanosis or edema   PELVIC: Exam chaperoned by nurse.  Normal appearing external genitalia. On speculum exam, normal appearing vagina and cervix. On bimanual exam, the cervix and uterus are normal size and mobile. No evidence of adnexal masses or nodularity. RECTAL: deferred   LLOYD SURVEY: No suspicious lymphadenopathy or edema noted. NEURO: Grossly intact. No acute deficit. Lab Date as available:    No results found for: WBC, HGB, HCT, PLT, MCV, HGBEXT, HCTEXT, PLTEXT, HGBEXT, HCTEXT, PLTEXT  No results found for: NA, K, CL, CO2, AGAP, GLU, BUN, CREA, BUCR, GFRAA, GFRNA, CA      IMPRESSION/PLAN:    Ms. Liya Adams is a 49 y.o. female with a working diagnosis of PMB with a history of breast cancer. Underwent a hysteroscopy/D&C with Dr. Bucky Pascual, Gynecologic Oncology in [place] in 11/2020. Her pathology was consistent with \"focally disordered proliferative endometrium. Benign endocervical mucosa. \"     Problems:     Patient Active Problem List    Diagnosis Date Noted    PMB (postmenopausal bleeding) 10/26/2021    Thalassemia     Hypertension     IBS (irritable bowel syndrome)     Esophageal spasm     Morbid obesity ([facility]     Diverticulosis     Diverticulitis     Fibromyalgia     Osteoarthritis     Breast cancer ([facility]        I reviewed Ms. Colin Mccoy's course to date, including her medical records, recent studies, physical exam, and review of symptoms. Counseled patient regarding the natural history of abnormal uterine bleeding and postmenopausal bleeding. I believe her risk of a malignancy is low, approximately 10%. However, I have recommended a pelvic ultrasound now, and will plan to proceed with a hysteroscopy/D&C to follow. Will have patient return to our office after her ultrasound for further discussion and planning. All questions and concerns were addressed with the patient and she is comfortable with the plan.      Defined Sensitive Document    >50% of total time allocated to visit dedicated to counseling, 50 minutes total.    Signed By: Jaye Holley MD     11/2/2021/2:52 PM

## 2021-11-02 ENCOUNTER — DOCUMENTATION ONLY (OUTPATIENT)
Dept: ONCOLOGY | Age: 51
End: 2021-11-02

## 2021-11-02 NOTE — PROGRESS NOTES
UPDATE:    Patient is now scheduled to see Meagan Mathis. Althea Landis, with Lymphedema Team, on November 9th, 2021 at 11:45AM!  Lymphedema Clinic referral is now CLOSED.   Heidi Servin

## 2021-11-03 ENCOUNTER — HOSPITAL ENCOUNTER (OUTPATIENT)
Dept: ULTRASOUND IMAGING | Age: 51
Discharge: HOME | End: 2021-11-03
Attending: OBSTETRICS & GYNECOLOGY
Payer: COMMERCIAL

## 2021-11-03 DIAGNOSIS — N95.0 POSTMENOPAUSAL BLEEDING: ICD-10-CM

## 2021-11-03 PROCEDURE — 76830 TRANSVAGINAL US NON-OB: CPT

## 2021-11-03 PROCEDURE — 76856 US EXAM PELVIC COMPLETE: CPT

## 2021-11-07 NOTE — PROGRESS NOTES
Subjective:     Juan Miguel Banegas is a 49 y.o. female who presents for follow up of hypertension. Seen 1 month ago and BP remained elevated but had missed meds for 2 days prior. Diet and Lifestyle: not attempting to follow a low sodium diet, sedentary  - has increased salt intake. Home BP Monitoring: elevated at previous [place] offices as well. Cardiovascular ROS: taking medications as instructed, no medication side effects noted, no TIA's, no chest pain on exertion, no dyspnea on exertion, noting swelling of ankles - unchanged, no orthostatic dizziness or lightheadedness, no palpitations but chest spasms (? Esophageal spasms). New concerns:   Since last visit has established with Dr. Kamila Brandt for hx of breast cancer. She also saw Dr. Gustavo Vo for AUB and postmenopausal bleeding who has planned a pelvic US followed by hysteroscopy/D&C   Had D&C in 2020 as well. US showed thickened endometrium. Has f/u with Dr. Gustavo Vo tomorrow virtually. Saw Dr. Brady Owens re esophageal spasms. Trying to obtain her old records to avoid another motility study    Current Outpatient Medications   Medication Sig Dispense Refill    polyethylene glycol (Miralax) 17 gram packet 1 packet mixed with 8 ounces of fluid      aspirin-acetaminophen-caffeine (Excedrin Extra Strength) 250-250-65 mg per tablet 2 tablets      wheat dextrin/calcium/aspartam (BENEFIBER + CALCIUM SUGAR-FREE PO) as directed.  dilTIAZem ER (DILACOR XR) 120 mg capsule Take 1 Capsule by mouth daily. 30 Capsule 5    OTHER daily. Black Cohosh      syringe with needle 1 mL 22 gauge x 1 1/2\" syrg 1 Each by Does Not Apply route every seven (7) days. For allergy injections 15 Each 0    propylene glycoL (Systane Balance) 0.6 % drop Apply 1 Drop to eye as needed.  biotin 10,000 mcg cap Take 1 Tablet by mouth daily.  cholecalciferol (VITAMIN D3) (5000 Units/125 mcg) tab tablet Take 5,000 Units by mouth daily.       clotrimazole-betamethasone (LOTRISONE) topical cream Apply  to affected area as needed.  cyanocobalamin 1,000 mcg tablet Take 100 mcg by mouth daily.  Dextrin 3 gram/3.5 gram powd Take  by mouth as needed.  docusate sodium (COLACE) 100 mg capsule Take 100 mg by mouth two (2) times a day.  DULoxetine (CYMBALTA) 60 mg capsule duloxetine 60 mg capsule,delayed release   Take 1 capsule every day by oral route.  folic acid 881 mcg tablet Take 400 mcg by mouth daily.  loratadine (CLARITIN) 10 mg tablet Take 10 mg by mouth.  losartan-hydroCHLOROthiazide (HYZAAR) 100-12.5 mg per tablet losartan 100 mg-hydrochlorothiazide 12.5 mg tablet   Take 1 tablet every day by oral route.  montelukast (SINGULAIR) 10 mg tablet montelukast 10 mg tablet   TAKE 1 TABLET BY MOUTH EVERY DAY AT NIGHT      multivitamin capsule Take 1 Capsule by mouth daily.  azelastine-fluticasone (Dymista) 137-50 mcg/spray spry 1 Spray by Nasal route two (2) times a day. Review of Systems, additional:  Pertinent items are noted in HPI. Objective:     Visit Vitals  BP (!) 148/90 (BP 1 Location: Left arm, BP Patient Position: Sitting, BP Cuff Size: Adult)   Pulse 83   Temp 98 °F (36.7 °C) (Temporal)   Resp 14   Ht 5' 9\" (1.753 m)   Wt 335 lb (152 kg)   SpO2 97%   BMI 49.47 kg/m²     Appearance: alert, well appearing, and in no distress and oriented to person, place, and time. General exam:   . NECK: supple, no lad, no bruit, no tm  LUNGS: cta bilat  CV rrr, no m/g/r  ABD: soft, nt, nd, nabs  EXT: no c/c, trace le edema. Assessment/Plan:     hypertension poorly controlled. the following changes are made - add diltiazem XR low dose. .     Esophageal spasms - adding dilt XR as above  F/u Dr. Mitul Freeman.       Orders Placed This Encounter    polyethylene glycol (Miralax) 17 gram packet    DISCONTD: cyanocobalamin (Vitamin B-12) 100 mcg tablet    aspirin-acetaminophen-caffeine (Excedrin Extra Strength) 250-250-65 mg per tablet    wheat dextrin/calcium/aspartam (BENEFIBER + CALCIUM SUGAR-FREE PO)    dilTIAZem ER (DILACOR XR) 120 mg capsule   f/u 1 month

## 2021-11-08 ENCOUNTER — OFFICE VISIT (OUTPATIENT)
Dept: INTERNAL MEDICINE CLINIC | Age: 51
End: 2021-11-08
Payer: COMMERCIAL

## 2021-11-08 VITALS
RESPIRATION RATE: 14 BRPM | HEIGHT: 69 IN | HEART RATE: 83 BPM | BODY MASS INDEX: 43.4 KG/M2 | SYSTOLIC BLOOD PRESSURE: 148 MMHG | OXYGEN SATURATION: 97 % | DIASTOLIC BLOOD PRESSURE: 90 MMHG | WEIGHT: 293 LBS | TEMPERATURE: 98 F

## 2021-11-08 DIAGNOSIS — I10 ESSENTIAL HYPERTENSION: Primary | ICD-10-CM

## 2021-11-08 PROCEDURE — 99213 OFFICE O/P EST LOW 20 MIN: CPT | Performed by: INTERNAL MEDICINE

## 2021-11-08 RX ORDER — UREA 10 %
LOTION (ML) TOPICAL
COMMUNITY
End: 2021-11-08 | Stop reason: SDUPTHER

## 2021-11-08 RX ORDER — DILTIAZEM HYDROCHLORIDE 120 MG/1
120 CAPSULE, EXTENDED RELEASE ORAL DAILY
Qty: 30 CAPSULE | Refills: 5 | Status: SHIPPED | OUTPATIENT
Start: 2021-11-08 | End: 2021-12-13 | Stop reason: DRUGHIGH

## 2021-11-08 RX ORDER — ACETAMINOPHEN, ASPIRIN (NSAID), AND CAFFEINE 250; 250; 65 MG/1; MG/1; MG/1
2 TABLET, FILM COATED ORAL 2 TIMES DAILY
COMMUNITY

## 2021-11-08 RX ORDER — POLYETHYLENE GLYCOL 3350 17 G/17G
POWDER, FOR SOLUTION ORAL AS NEEDED
COMMUNITY
End: 2022-10-05 | Stop reason: ALTCHOICE

## 2021-11-08 NOTE — PROGRESS NOTES
Virtual visit to discuss ultrasound results from 11/3/2021    1. Have you been to the ER, urgent care clinic since your last visit? Hospitalized since your last visit?  no    2. Have you seen or consulted any other health care providers outside of the [address] [place] [address] since your last visit? Include any pap smears or colon screening.    no

## 2021-11-08 NOTE — PROGRESS NOTES
Reviewed record in preparation for visit and have obtained necessary documentation. Identified pt with two pt identifiers(name and ). Chief Complaint   Patient presents with    Follow-up     Blood pressure (!) 148/90, pulse 83, temperature 98 °F (36.7 °C), temperature source Temporal, resp. rate 14, height 5' 9\" (1.753 m), weight 335 lb (152 kg), SpO2 97 %. Health Maintenance Due   Topic Date Due    Hepatitis C Test  Never done    Cervical cancer screen  Never done    Mammogram  Never done    Cholesterol Test   Never done    Colorectal Screening  Never done    Yearly Flu Vaccine (1) 2021    Shingles Vaccine (1 of 2) Never done       Ms. Alfonzo Whyte has a reminder for a \"due or due soon\" health maintenance. I have asked that she discuss this further with her primary care provider for follow-up on this health maintenance. Coordination of Care Questionnaire:  :     1) Have you been to an emergency room, urgent care clinic since your last visit? no   Hospitalized since your last visit? no             2) Have you seen or consulted any other health care providers outside of [address] [place] [address] since your last visit? yes  (Include any pap smears or colon screenings in this section.)    3) In the event something were to happen to you and you were unable to speak on your behalf, do you have an Advance Directive/ Living Will in place stating your wishes?  YES

## 2021-11-09 ENCOUNTER — TELEPHONE (OUTPATIENT)
Dept: GYNECOLOGY | Age: 51
End: 2021-11-09

## 2021-11-09 ENCOUNTER — VIRTUAL VISIT (OUTPATIENT)
Dept: GYNECOLOGY | Age: 51
End: 2021-11-09

## 2021-11-09 DIAGNOSIS — E66.01 MORBID OBESITY (HCC): ICD-10-CM

## 2021-11-09 DIAGNOSIS — Z17.1 MALIGNANT NEOPLASM OF LEFT BREAST IN FEMALE, ESTROGEN RECEPTOR NEGATIVE, UNSPECIFIED SITE OF BREAST (HCC): ICD-10-CM

## 2021-11-09 DIAGNOSIS — N95.0 PMB (POSTMENOPAUSAL BLEEDING): Primary | ICD-10-CM

## 2021-11-09 DIAGNOSIS — C50.912 MALIGNANT NEOPLASM OF LEFT BREAST IN FEMALE, ESTROGEN RECEPTOR NEGATIVE, UNSPECIFIED SITE OF BREAST (HCC): ICD-10-CM

## 2021-11-09 PROCEDURE — 99214 OFFICE O/P EST MOD 30 MIN: CPT | Performed by: OBSTETRICS & GYNECOLOGY

## 2021-11-09 NOTE — H&P (VIEW-ONLY)
[address] [place] [address] [address], [address] [facility]  P [phone]  F [phone]    Office Note  Patient ID:  Name:  Elisha Callejas  MRN:  [id number]  :  1970/51 y.o. Date:  11/15/2021      HISTORY OF PRESENT ILLNESS:  Ms. Elisha Callejas is a 49 y.o.  postmenopausal female who presents as a new patient from Dr. Nathan Hayes for vaginal bleeding/spotting. The patient has a history of triple negative breast cancer diagnosed in 2016 s/p lumpectomy/AC-T chemo and radiation. The patient reports menopause a few years ago while undergoing treatment for her breast cancer. She presented last year with vaginal spotting/bleeding and underwent a hysteroscopy/D&C with Dr. Peggy Campbell, Gynecologic Oncology in [place]. Her pathology was consistent with \"focally disordered proliferative endometrium. Benign endocervical mucosa. \"     She is now followed by Dr. Nathan Hayes for her breast cancer. She is referred to our office for continued vaginal spotting/bleeding. Interval History:  Presents back today for ultrasound results. Pertinent PMH/PSH: breast cancer, HTN, obesity, IBS      Active, no restrictions. Imaging Review:  Pelvic ultrasound 11/3/2021:  FINDINGS:    TRANSABDOMINAL:  The uterus measures 10.2 x 5.3 x 8.3 cm. The endometrial stripe is thickened  measuring 10 mm. The uterus otherwise appears normal.  The bilateral ovaries are not well seen due to overlying bowel gas. TRANSVAGINAL:  The uterus measures 8.7 x 5.5 x 6.9 cm. The endometrial stripe is thickened  measuring 12 mm. The uterus otherwise appears normal and anteverted. The bilateral ovaries are not well seen due to overlying bowel gas. No free fluid in the pelvis. IMPRESSION  1. Thickened endometrial stripe in a postmenopausal patient, with differential  considerations of endometrial hyperplasia, carcinoma, and polyp. Recommend  endometrial biopsy. Pathology Review:   2020:  JOHN Refill approved for 1 month.  Eulalio Pastor needs to be seen for an appointment before further refills are given.     ECC: benign endocervical mucosa with chronic inflammation  B. Endometrium: focally disordered proliferative endometrium. Benign endocervical mucosa. ROS:  A comprehensive review of systems was negative except for that written in the History of Present Illness.  , 10 point ROS      ECOG stGstrstastdstest:st st1st Problem List:  Patient Active Problem List    Diagnosis Date Noted    PMB (postmenopausal bleeding) 10/26/2021    Thalassemia     Hypertension     IBS (irritable bowel syndrome)     Esophageal spasm     Morbid obesity ([address]     Diverticulosis     Diverticulitis     Fibromyalgia     Osteoarthritis     Breast cancer ([address]      PMH:  Past Medical History:   Diagnosis Date    Breast cancer ([address]     Diverticulitis     Diverticulosis     Esophageal spasm     Fibromyalgia     Hypertension     IBS (irritable bowel syndrome)     Morbid obesity ([address]     Osteoarthritis     Thalassemia       PSH:  Past Surgical History:   Procedure Laterality Date    HX BARIATRIC SURGERY  10/2010    lap band    HX BREAST LUMPECTOMY Left 07/2016    HX DILATION AND CURETTAGE  11/2020    HX ORTHOPAEDIC  10/2014    right foot and ankle reconstruction for flat feet    HX ORTHOPAEDIC Bilateral 2008    knee arthroscopy    HX ORTHOPAEDIC Left 2006    heal spur surgery    HX TONSIL AND ADENOIDECTOMY        Social History:  Social History     Tobacco Use    Smoking status: Never Smoker    Smokeless tobacco: Never Used   Substance Use Topics    Alcohol use: Yes     Comment: 1 time a year      Family History:  Family History   Problem Relation Age of Onset    Arthritis-osteo Mother     Heart Disease Father         chf    Cancer Father         lung    Hypertension Father    Ardyth Moritz Arthritis-rheumatoid Brother     Hypertension Brother     Hypertension Brother     Elevated Lipids Brother     Arthritis-osteo Brother       Medications: (reviewed)  Current Outpatient Medications   Medication Sig    polyethylene glycol (Miralax) 17 gram packet 1 packet mixed with 8 ounces of fluid    aspirin-acetaminophen-caffeine (Excedrin Extra Strength) 250-250-65 mg per tablet 2 tablets    wheat dextrin/calcium/aspartam (BENEFIBER + CALCIUM SUGAR-FREE PO) as directed.  dilTIAZem ER (DILACOR XR) 120 mg capsule Take 1 Capsule by mouth daily.  OTHER daily. Black Cohosh    syringe with needle 1 mL 22 gauge x 1 1/2\" syrg 1 Each by Does Not Apply route every seven (7) days. For allergy injections    propylene glycoL (Systane Balance) 0.6 % drop Apply 1 Drop to eye as needed.  biotin 10,000 mcg cap Take 1 Tablet by mouth daily.  cholecalciferol (VITAMIN D3) (5000 Units/125 mcg) tab tablet Take 5,000 Units by mouth daily.  clotrimazole-betamethasone (LOTRISONE) topical cream Apply  to affected area as needed.  cyanocobalamin 1,000 mcg tablet Take 100 mcg by mouth daily.  Dextrin 3 gram/3.5 gram powd Take  by mouth as needed.  DULoxetine (CYMBALTA) 60 mg capsule duloxetine 60 mg capsule,delayed release   Take 1 capsule every day by oral route.  folic acid 481 mcg tablet Take 400 mcg by mouth daily.  loratadine (CLARITIN) 10 mg tablet Take 10 mg by mouth.  losartan-hydroCHLOROthiazide (HYZAAR) 100-12.5 mg per tablet losartan 100 mg-hydrochlorothiazide 12.5 mg tablet   Take 1 tablet every day by oral route.  montelukast (SINGULAIR) 10 mg tablet montelukast 10 mg tablet   TAKE 1 TABLET BY MOUTH EVERY DAY AT NIGHT    multivitamin capsule Take 1 Capsule by mouth daily.  azelastine-fluticasone (Dymista) 137-50 mcg/spray spry 1 Spray by Nasal route two (2) times a day.  docusate sodium (COLACE) 100 mg capsule Take 100 mg by mouth two (2) times a day. (Patient not taking: Reported on 11/9/2021)     No current facility-administered medications for this visit. Allergies: (reviewed)  Allergies   Allergen Reactions    Other Food Sneezing    Adhesive Other (comments)     Tears skin off. Severe.       Oxycodone Itching    Lisinopril Other (comments)     \"constant tickle in my throat\"  \"constant tickle in my throat\"      Mold Other (comments)    Nickel Other (comments)    Sulfa (Sulfonamide Antibiotics) Other (comments)    Sulfamethoxazole-Trimethoprim Hives    Anesthetics - Amide Type - Select Amino Amides Nausea Only        OBJECTIVE:  *deferred today given video-conference visit for ongoing COVID-19 pandemic*   Physical Exam:  VITAL SIGNS: There were no vitals filed for this visit. There is no height or weight on file to calculate BMI. GENERAL GEOVANNI: Conversant, alert, oriented. No acute distress. HEENT: HEENT. No thyroid enlargement. No JVD. Neck: Supple without restrictions. RESPIRATORY: Clear to auscultation and percussion to the bases. No CVAT. CARDIOVASC: RRR without murmur/rub. GASTROINT: soft, non-tender, without masses or organomegaly, obese   MUSCULOSKEL: no joint tenderness, deformity or swelling       EXTREMITIES: extremities normal, atraumatic, no cyanosis or edema   PELVIC: Exam chaperoned by nurse. Normal appearing external genitalia. On speculum exam, normal appearing vagina and cervix. On bimanual exam, the cervix and uterus are normal size and mobile. No evidence of adnexal masses or nodularity. RECTAL: deferred   LLOYD SURVEY: No suspicious lymphadenopathy or edema noted. NEURO: Grossly intact. No acute deficit. Lab Date as available:    No results found for: WBC, HGB, HCT, PLT, MCV, HGBEXT, HCTEXT, PLTEXT, HGBEXT, HCTEXT, PLTEXT  No results found for: NA, K, CL, CO2, AGAP, GLU, BUN, CREA, BUCR, GFRAA, GFRNA, CA      IMPRESSION/PLAN:    Ms. Juan Miguel Banegas is a 49 y.o. female with a working diagnosis of PMB with a history of breast cancer. Underwent a hysteroscopy/D&C with Dr. Sultana Valverde, Gynecologic Oncology in [place] in 11/2020. Her pathology was consistent with \"focally disordered proliferative endometrium. Benign endocervical mucosa. \"     Problems:     Patient Active Problem List    Diagnosis Date Noted    PMB (postmenopausal bleeding) 10/26/2021    Thalassemia     Hypertension     IBS (irritable bowel syndrome)     Esophageal spasm     Morbid [facility]     Diverticulosis     Diverticulitis     Fibromyalgia     Osteoarthritis     Breast cancer ([facility]        Reviewed patient's course to date. Pelvic ultrasound on 11/3/2021 demonstrates thickened endometrial stripe of 12mm. Counseled patient regarding the natural history of abnormal uterine bleeding and postmenopausal bleeding. I believe her risk of a malignancy is low, approximately 10%. However, given her PMB and thickened endometrial stripe. I have recommended a repeat hysteroscopy/D&C. Will post at the patient's convenience. Will collect CBCD, CMP, HbA1c, CXR, and EKG prior to surgery. All questions and concerns were addressed with the patient and she is comfortable with the plan. An electronic signature was used to authenticate this note. Kalee Gray MD    Pursuant to the emergency declaration unde the [address] [place] [address], [facility] waiver authority and the [employer] Act, this Virtual Visit was conducted, with patient's consent, to reduce the patient's risk of exposure to COVID-19 and provide continuity of care for an established patient. Patient identification was verified at the start of the visit: Yes    Services were provided through a video synchronous discussion virtually to substitute for in-person clinic visit. Patient was at her individual home, while the provider was in his/her respective office.     I spent at least 25 minutes with this established patient, and >50% of the time was spent counseling and/or coordinating care regarding surgical planning    Kalee Gray MD

## 2021-11-09 NOTE — PROGRESS NOTES
[address] [place] [address] [address], [address] [facility]  P [phone]  F [phone]    Office Note  Patient ID:  Name:  Bryn Boast  MRN:  [id number]  :  1970/51 y.o. Date:  11/15/2021      HISTORY OF PRESENT ILLNESS:  Ms. Bryn Boast is a 49 y.o.  postmenopausal female who presents as a new patient from Dr. Alireza House for vaginal bleeding/spotting. The patient has a history of triple negative breast cancer diagnosed in 2016 s/p lumpectomy/AC-T chemo and radiation. The patient reports menopause a few years ago while undergoing treatment for her breast cancer. She presented last year with vaginal spotting/bleeding and underwent a hysteroscopy/D&C with Dr. Andrez Gómez, Gynecologic Oncology in [place], [place]. Her pathology was consistent with \"focally disordered proliferative endometrium. Benign endocervical mucosa. \"     She is now followed by Dr. Alireza House for her breast cancer. She is referred to our office for continued vaginal spotting/bleeding. Interval History:  Presents back today for ultrasound results. Pertinent PMH/PSH: breast cancer, HTN, obesity, IBS      Active, no restrictions. Imaging Review:  Pelvic ultrasound 11/3/2021:  FINDINGS:    TRANSABDOMINAL:  The uterus measures 10.2 x 5.3 x 8.3 cm. The endometrial stripe is thickened  measuring 10 mm. The uterus otherwise appears normal.  The bilateral ovaries are not well seen due to overlying bowel gas. TRANSVAGINAL:  The uterus measures 8.7 x 5.5 x 6.9 cm. The endometrial stripe is thickened  measuring 12 mm. The uterus otherwise appears normal and anteverted. The bilateral ovaries are not well seen due to overlying bowel gas. No free fluid in the pelvis. IMPRESSION  1. Thickened endometrial stripe in a postmenopausal patient, with differential  considerations of endometrial hyperplasia, carcinoma, and polyp. Recommend  endometrial biopsy. Pathology Review:   2020:  JOHN ECC: benign endocervical mucosa with chronic inflammation  B. Endometrium: focally disordered proliferative endometrium. Benign endocervical mucosa. ROS:  A comprehensive review of systems was negative except for that written in the History of Present Illness.  , 10 point ROS      ECOG rdGrdrrdarddrderd:rd rd3rd Problem List:  Patient Active Problem List    Diagnosis Date Noted    PMB (postmenopausal bleeding) 10/26/2021    Thalassemia     Hypertension     IBS (irritable bowel syndrome)     Esophageal spasm     Morbid obesity ([address]     Diverticulosis     Diverticulitis     Fibromyalgia     Osteoarthritis     Breast cancer ([address]      PMH:  Past Medical History:   Diagnosis Date    Breast cancer ([address]     Diverticulitis     Diverticulosis     Esophageal spasm     Fibromyalgia     Hypertension     IBS (irritable bowel syndrome)     Morbid obesity ([address]     Osteoarthritis     Thalassemia       PSH:  Past Surgical History:   Procedure Laterality Date    HX BARIATRIC SURGERY  10/2010    lap band    HX BREAST LUMPECTOMY Left 07/2016    HX DILATION AND CURETTAGE  11/2020    HX ORTHOPAEDIC  10/2014    right foot and ankle reconstruction for flat feet    HX ORTHOPAEDIC Bilateral 2008    knee arthroscopy    HX ORTHOPAEDIC Left 2006    heal spur surgery    HX TONSIL AND ADENOIDECTOMY        Social History:  Social History     Tobacco Use    Smoking status: Never Smoker    Smokeless tobacco: Never Used   Substance Use Topics    Alcohol use: Yes     Comment: 1 time a year      Family History:  Family History   Problem Relation Age of Onset    Arthritis-osteo Mother     Heart Disease Father         chf    Cancer Father         lung    Hypertension Father    [facility] Arthritis-rheumatoid Brother     Hypertension Brother     Hypertension Brother     Elevated Lipids Brother     Arthritis-osteo Brother       Medications: (reviewed)  Current Outpatient Medications   Medication Sig    polyethylene glycol (Miralax) 17 gram packet 1 packet mixed with 8 ounces of fluid    aspirin-acetaminophen-caffeine (Excedrin Extra Strength) 250-250-65 mg per tablet 2 tablets    wheat dextrin/calcium/aspartam (BENEFIBER + CALCIUM SUGAR-FREE PO) as directed.  dilTIAZem ER (DILACOR XR) 120 mg capsule Take 1 Capsule by mouth daily.  OTHER daily. Black Cohosh    syringe with needle 1 mL 22 gauge x 1 1/2\" syrg 1 Each by Does Not Apply route every seven (7) days. For allergy injections    propylene glycoL (Systane Balance) 0.6 % drop Apply 1 Drop to eye as needed.  biotin 10,000 mcg cap Take 1 Tablet by mouth daily.  cholecalciferol (VITAMIN D3) (5000 Units/125 mcg) tab tablet Take 5,000 Units by mouth daily.  clotrimazole-betamethasone (LOTRISONE) topical cream Apply  to affected area as needed.  cyanocobalamin 1,000 mcg tablet Take 100 mcg by mouth daily.  Dextrin 3 gram/3.5 gram powd Take  by mouth as needed.  DULoxetine (CYMBALTA) 60 mg capsule duloxetine 60 mg capsule,delayed release   Take 1 capsule every day by oral route.  folic acid 573 mcg tablet Take 400 mcg by mouth daily.  loratadine (CLARITIN) 10 mg tablet Take 10 mg by mouth.  losartan-hydroCHLOROthiazide (HYZAAR) 100-12.5 mg per tablet losartan 100 mg-hydrochlorothiazide 12.5 mg tablet   Take 1 tablet every day by oral route.  montelukast (SINGULAIR) 10 mg tablet montelukast 10 mg tablet   TAKE 1 TABLET BY MOUTH EVERY DAY AT NIGHT    multivitamin capsule Take 1 Capsule by mouth daily.  azelastine-fluticasone (Dymista) 137-50 mcg/spray spry 1 Spray by Nasal route two (2) times a day.  docusate sodium (COLACE) 100 mg capsule Take 100 mg by mouth two (2) times a day. (Patient not taking: Reported on 11/9/2021)     No current facility-administered medications for this visit. Allergies: (reviewed)  Allergies   Allergen Reactions    Other Food Sneezing    Adhesive Other (comments)     Tears skin off. Severe.       Oxycodone Itching    Lisinopril Other (comments)     \"constant tickle in my throat\"  \"constant tickle in my throat\"      Mold Other (comments)    Nickel Other (comments)    Sulfa (Sulfonamide Antibiotics) Other (comments)    Sulfamethoxazole-Trimethoprim Hives    Anesthetics - Amide Type - Select Amino Amides Nausea Only        OBJECTIVE:  *deferred today given video-conference visit for ongoing COVID-19 pandemic*   Physical Exam:  VITAL SIGNS: There were no vitals filed for this visit. There is no height or weight on file to calculate BMI. GENERAL GEOVANNI: Conversant, alert, oriented. No acute distress. HEENT: HEENT. No thyroid enlargement. No JVD. Neck: Supple without restrictions. RESPIRATORY: Clear to auscultation and percussion to the bases. No CVAT. CARDIOVASC: RRR without murmur/rub. GASTROINT: soft, non-tender, without masses or organomegaly, obese   MUSCULOSKEL: no joint tenderness, deformity or swelling       EXTREMITIES: extremities normal, atraumatic, no cyanosis or edema   PELVIC: Exam chaperoned by nurse. Normal appearing external genitalia. On speculum exam, normal appearing vagina and cervix. On bimanual exam, the cervix and uterus are normal size and mobile. No evidence of adnexal masses or nodularity. RECTAL: deferred   LLOYD SURVEY: No suspicious lymphadenopathy or edema noted. NEURO: Grossly intact. No acute deficit. Lab Date as available:    No results found for: WBC, HGB, HCT, PLT, MCV, HGBEXT, HCTEXT, PLTEXT, HGBEXT, HCTEXT, PLTEXT  No results found for: NA, K, CL, CO2, AGAP, GLU, BUN, CREA, BUCR, GFRAA, GFRNA, CA      IMPRESSION/PLAN:    Ms. Vishal Jimenez is a 49 y.o. female with a working diagnosis of PMB with a history of breast cancer. Underwent a hysteroscopy/D&C with Dr. Lennie Vaughn, Gynecologic Oncology in [place] in 11/2020. Her pathology was consistent with \"focally disordered proliferative endometrium. Benign endocervical mucosa. \"     Problems:     Patient Active Problem List    Diagnosis Date Noted    PMB (postmenopausal bleeding) 10/26/2021    Thalassemia     Hypertension     IBS (irritable bowel syndrome)     Esophageal spasm     Morbid [facility] ([facility]     Diverticulosis     Diverticulitis     Fibromyalgia     Osteoarthritis     Breast cancer ([facility]        Reviewed patient's course to date. Pelvic ultrasound on 11/3/2021 demonstrates thickened endometrial stripe of 12mm. Counseled patient regarding the natural history of abnormal uterine bleeding and postmenopausal bleeding. I believe her risk of a malignancy is low, approximately 10%. However, given her PMB and thickened endometrial stripe. I have recommended a repeat hysteroscopy/D&C. Will post at the patient's convenience. Will collect CBCD, CMP, HbA1c, CXR, and EKG prior to surgery. All questions and concerns were addressed with the patient and she is comfortable with the plan. An electronic signature was used to authenticate this note. Jose Luis Irving MD    Pursuant to the emergency declaration unde the [address] [place] [address], [facility] waiver authority and the [employer] Act, this Virtual Visit was conducted, with patient's consent, to reduce the patient's risk of exposure to COVID-19 and provide continuity of care for an established patient. Patient identification was verified at the start of the visit: Yes    Services were provided through a video synchronous discussion virtually to substitute for in-person clinic visit. Patient was at her individual home, while the provider was in his/her respective office.     I spent at least 25 minutes with this established patient, and >50% of the time was spent counseling and/or coordinating care regarding surgical planning    Jose Luis Irving MD

## 2021-11-10 ENCOUNTER — TRANSCRIBE ORDER (OUTPATIENT)
Dept: REGISTRATION | Age: 51
End: 2021-11-10

## 2021-11-10 DIAGNOSIS — Z01.812 PRE-PROCEDURE LAB EXAM: Primary | ICD-10-CM

## 2021-11-16 ENCOUNTER — APPOINTMENT (OUTPATIENT)
Dept: PHYSICAL THERAPY | Age: 51
End: 2021-11-16
Payer: COMMERCIAL

## 2021-11-16 ENCOUNTER — HOSPITAL ENCOUNTER (OUTPATIENT)
Dept: PREADMISSION TESTING | Age: 51
Discharge: HOME | End: 2021-11-16
Payer: COMMERCIAL

## 2021-11-16 ENCOUNTER — HOSPITAL ENCOUNTER (OUTPATIENT)
Dept: GENERAL RADIOLOGY | Age: 51
Discharge: HOME | End: 2021-11-16
Attending: OBSTETRICS & GYNECOLOGY
Payer: COMMERCIAL

## 2021-11-16 ENCOUNTER — HOSPITAL ENCOUNTER (OUTPATIENT)
Dept: PREADMISSION TESTING | Age: 51
Discharge: HOME | End: 2021-11-16
Attending: OBSTETRICS & GYNECOLOGY
Payer: COMMERCIAL

## 2021-11-16 VITALS
TEMPERATURE: 98 F | HEART RATE: 77 BPM | HEIGHT: 70 IN | SYSTOLIC BLOOD PRESSURE: 150 MMHG | DIASTOLIC BLOOD PRESSURE: 85 MMHG | RESPIRATION RATE: 16 BRPM | BODY MASS INDEX: 41.95 KG/M2 | WEIGHT: 293 LBS

## 2021-11-16 DIAGNOSIS — Z01.812 PRE-PROCEDURE LAB EXAM: ICD-10-CM

## 2021-11-16 LAB
ALBUMIN SERPL-MCNC: 3.6 G/DL (ref 3.5–5)
ALBUMIN/GLOB SERPL: 0.9 {RATIO} (ref 1.1–2.2)
ALP SERPL-CCNC: 159 U/L (ref 45–117)
ALT SERPL-CCNC: 23 U/L (ref 12–78)
ANION GAP SERPL CALC-SCNC: 6 MMOL/L (ref 5–15)
AST SERPL-CCNC: 21 U/L (ref 15–37)
BASOPHILS # BLD: 0 K/UL (ref 0–0.1)
BASOPHILS NFR BLD: 0 % (ref 0–1)
BILIRUB SERPL-MCNC: 0.5 MG/DL (ref 0.2–1)
BUN SERPL-MCNC: 13 MG/DL (ref 6–20)
BUN/CREAT SERPL: 18 (ref 12–20)
CALCIUM SERPL-MCNC: 8.9 MG/DL (ref 8.5–10.1)
CHLORIDE SERPL-SCNC: 104 MMOL/L (ref 97–108)
CO2 SERPL-SCNC: 29 MMOL/L (ref 21–32)
CREAT SERPL-MCNC: 0.73 MG/DL (ref 0.55–1.02)
DIFFERENTIAL METHOD BLD: ABNORMAL
EOSINOPHIL # BLD: 0.1 K/UL (ref 0–0.4)
EOSINOPHIL NFR BLD: 1 % (ref 0–7)
ERYTHROCYTE [DISTWIDTH] IN BLOOD BY AUTOMATED COUNT: 14.4 % (ref 11.5–14.5)
GLOBULIN SER CALC-MCNC: 3.9 G/DL (ref 2–4)
GLUCOSE SERPL-MCNC: 106 MG/DL (ref 65–100)
HCT VFR BLD AUTO: 34.9 % (ref 35–47)
HGB BLD-MCNC: 11 G/DL (ref 11.5–16)
IMM GRANULOCYTES # BLD AUTO: 0 K/UL (ref 0–0.04)
IMM GRANULOCYTES NFR BLD AUTO: 0 % (ref 0–0.5)
LYMPHOCYTES # BLD: 1.9 K/UL (ref 0.8–3.5)
LYMPHOCYTES NFR BLD: 23 % (ref 12–49)
MCH RBC QN AUTO: 27.1 PG (ref 26–34)
MCHC RBC AUTO-ENTMCNC: 31.5 G/DL (ref 30–36.5)
MCV RBC AUTO: 86 FL (ref 80–99)
MONOCYTES # BLD: 0.5 K/UL (ref 0–1)
MONOCYTES NFR BLD: 6 % (ref 5–13)
NEUTS SEG # BLD: 5.5 K/UL (ref 1.8–8)
NEUTS SEG NFR BLD: 70 % (ref 32–75)
NRBC # BLD: 0 K/UL (ref 0–0.01)
NRBC BLD-RTO: 0 PER 100 WBC
PLATELET # BLD AUTO: 273 K/UL (ref 150–400)
PMV BLD AUTO: 10.8 FL (ref 8.9–12.9)
POTASSIUM SERPL-SCNC: 3.6 MMOL/L (ref 3.5–5.1)
PROT SERPL-MCNC: 7.5 G/DL (ref 6.4–8.2)
RBC # BLD AUTO: 4.06 M/UL (ref 3.8–5.2)
SODIUM SERPL-SCNC: 139 MMOL/L (ref 136–145)
WBC # BLD AUTO: 7.9 K/UL (ref 3.6–11)

## 2021-11-16 PROCEDURE — 36415 COLL VENOUS BLD VENIPUNCTURE: CPT

## 2021-11-16 PROCEDURE — 80053 COMPREHEN METABOLIC PANEL: CPT

## 2021-11-16 PROCEDURE — 93005 ELECTROCARDIOGRAM TRACING: CPT

## 2021-11-16 PROCEDURE — 71046 X-RAY EXAM CHEST 2 VIEWS: CPT

## 2021-11-16 PROCEDURE — 85025 COMPLETE CBC W/AUTO DIFF WBC: CPT

## 2021-11-16 PROCEDURE — U0005 INFEC AGEN DETEC AMPLI PROBE: HCPCS

## 2021-11-16 NOTE — PERIOP NOTES
DO NOT EAT OR DRINK ANYTHING AFTER MIDNIGHT, except as instructed THE NIGHT BEFORE SURGERY. PT GIVEN OPPORTUNITY TO ASK ADDITIONAL QUESTIONS. Patient given two bottles CHG soap and instruction sheet, instructions for use reviewed with patient. PATIENT GIVEN WRITTEN INSTRUCTIONS TO GO TO THE IMAGING CENTER/CANCER CENTER [address] [facility] [address] TO HAVE CHEST XRAY COMPLETED. Patient given surgical site infection information FAQs handout and hand hygiene tips sheet. Pre-operative instructions reviewed and patient verbalizes understanding of instructions. Patient has been given the opportunity to ask additional questions. [facility] APPOINTMENTS REVIEWED AND GIVEN TO PATIENT. COVID-19 INSTRUCTION SHEET ALSO REVIEWED AND GIVEN TO PATIENT. [facility] CALLED TO NOTIFY OF PT WEIGHT, SPOKE W/ ADELAIDA.

## 2021-11-17 LAB
ATRIAL RATE: 77 BPM
CALCULATED P AXIS, ECG09: 38 DEGREES
CALCULATED R AXIS, ECG10: -28 DEGREES
CALCULATED T AXIS, ECG11: 44 DEGREES
DIAGNOSIS, 93000: NORMAL
P-R INTERVAL, ECG05: 192 MS
Q-T INTERVAL, ECG07: 380 MS
QRS DURATION, ECG06: 88 MS
QTC CALCULATION (BEZET), ECG08: 430 MS
SARS-COV-2, XPLCVT: NOT DETECTED
SOURCE, COVRS: NORMAL
VENTRICULAR RATE, ECG03: 77 BPM

## 2021-11-18 ENCOUNTER — ANESTHESIA EVENT (OUTPATIENT)
Dept: SURGERY | Age: 51
End: 2021-11-18
Payer: COMMERCIAL

## 2021-11-18 ENCOUNTER — APPOINTMENT (OUTPATIENT)
Dept: PHYSICAL THERAPY | Age: 51
End: 2021-11-18
Payer: COMMERCIAL

## 2021-11-19 ENCOUNTER — ANESTHESIA (OUTPATIENT)
Dept: SURGERY | Age: 51
End: 2021-11-19
Payer: COMMERCIAL

## 2021-11-19 ENCOUNTER — HOSPITAL ENCOUNTER (OUTPATIENT)
Age: 51
Setting detail: OUTPATIENT SURGERY
Discharge: HOME | End: 2021-11-19
Attending: OBSTETRICS & GYNECOLOGY | Admitting: OBSTETRICS & GYNECOLOGY
Payer: COMMERCIAL

## 2021-11-19 VITALS
BODY MASS INDEX: 41.95 KG/M2 | WEIGHT: 293 LBS | RESPIRATION RATE: 17 BRPM | TEMPERATURE: 97.6 F | HEIGHT: 70 IN | HEART RATE: 79 BPM | OXYGEN SATURATION: 98 % | SYSTOLIC BLOOD PRESSURE: 148 MMHG | DIASTOLIC BLOOD PRESSURE: 82 MMHG

## 2021-11-19 PROCEDURE — 74011250637 HC RX REV CODE- 250/637: Performed by: ANESTHESIOLOGY

## 2021-11-19 PROCEDURE — 76010000138 HC OR TIME 0.5 TO 1 HR: Performed by: OBSTETRICS & GYNECOLOGY

## 2021-11-19 PROCEDURE — 77030033650 HC DEV TISS RMVL MYOSUR HOLO -F: Performed by: OBSTETRICS & GYNECOLOGY

## 2021-11-19 PROCEDURE — 2709999900 HC NON-CHARGEABLE SUPPLY: Performed by: OBSTETRICS & GYNECOLOGY

## 2021-11-19 PROCEDURE — 77030040922 HC BLNKT HYPOTHRM STRY -A

## 2021-11-19 PROCEDURE — 77030019905 HC CATH URETH INTMIT MDII -A: Performed by: OBSTETRICS & GYNECOLOGY

## 2021-11-19 PROCEDURE — 74011000250 HC RX REV CODE- 250: Performed by: NURSE ANESTHETIST, CERTIFIED REGISTERED

## 2021-11-19 PROCEDURE — 76060000032 HC ANESTHESIA 0.5 TO 1 HR: Performed by: OBSTETRICS & GYNECOLOGY

## 2021-11-19 PROCEDURE — 74011250636 HC RX REV CODE- 250/636: Performed by: NURSE ANESTHETIST, CERTIFIED REGISTERED

## 2021-11-19 PROCEDURE — 76210000020 HC REC RM PH II FIRST 0.5 HR: Performed by: OBSTETRICS & GYNECOLOGY

## 2021-11-19 PROCEDURE — 88305 TISSUE EXAM BY PATHOLOGIST: CPT

## 2021-11-19 PROCEDURE — 77030041423 HC SYST FLUID MNGMT FLUENT HOLO -D: Performed by: OBSTETRICS & GYNECOLOGY

## 2021-11-19 PROCEDURE — 77030040361 HC SLV COMPR DVT MDII -B: Performed by: OBSTETRICS & GYNECOLOGY

## 2021-11-19 PROCEDURE — 76210000006 HC OR PH I REC 0.5 TO 1 HR: Performed by: OBSTETRICS & GYNECOLOGY

## 2021-11-19 PROCEDURE — 74011250636 HC RX REV CODE- 250/636: Performed by: ANESTHESIOLOGY

## 2021-11-19 PROCEDURE — 77030008684 HC TU ET CUF COVD -B: Performed by: ANESTHESIOLOGY

## 2021-11-19 PROCEDURE — 77030026438 HC STYL ET INTUB CARD -A: Performed by: ANESTHESIOLOGY

## 2021-11-19 RX ORDER — MIDAZOLAM HYDROCHLORIDE 1 MG/ML
1 INJECTION, SOLUTION INTRAMUSCULAR; INTRAVENOUS AS NEEDED
Status: DISCONTINUED | OUTPATIENT
Start: 2021-11-19 | End: 2021-11-19 | Stop reason: HOSPADM

## 2021-11-19 RX ORDER — FENTANYL CITRATE 50 UG/ML
50 INJECTION, SOLUTION INTRAMUSCULAR; INTRAVENOUS AS NEEDED
Status: DISCONTINUED | OUTPATIENT
Start: 2021-11-19 | End: 2021-11-19 | Stop reason: HOSPADM

## 2021-11-19 RX ORDER — MIDAZOLAM HYDROCHLORIDE 1 MG/ML
0.5 INJECTION, SOLUTION INTRAMUSCULAR; INTRAVENOUS
Status: DISCONTINUED | OUTPATIENT
Start: 2021-11-19 | End: 2021-11-19 | Stop reason: HOSPADM

## 2021-11-19 RX ORDER — FENTANYL CITRATE 50 UG/ML
INJECTION, SOLUTION INTRAMUSCULAR; INTRAVENOUS AS NEEDED
Status: DISCONTINUED | OUTPATIENT
Start: 2021-11-19 | End: 2021-11-19 | Stop reason: HOSPADM

## 2021-11-19 RX ORDER — SODIUM CHLORIDE, SODIUM LACTATE, POTASSIUM CHLORIDE, CALCIUM CHLORIDE 600; 310; 30; 20 MG/100ML; MG/100ML; MG/100ML; MG/100ML
INJECTION, SOLUTION INTRAVENOUS
Status: DISCONTINUED | OUTPATIENT
Start: 2021-11-19 | End: 2021-11-19 | Stop reason: HOSPADM

## 2021-11-19 RX ORDER — SODIUM CHLORIDE, SODIUM LACTATE, POTASSIUM CHLORIDE, CALCIUM CHLORIDE 600; 310; 30; 20 MG/100ML; MG/100ML; MG/100ML; MG/100ML
125 INJECTION, SOLUTION INTRAVENOUS CONTINUOUS
Status: DISCONTINUED | OUTPATIENT
Start: 2021-11-19 | End: 2021-11-19 | Stop reason: HOSPADM

## 2021-11-19 RX ORDER — DIPHENHYDRAMINE HYDROCHLORIDE 50 MG/ML
12.5 INJECTION, SOLUTION INTRAMUSCULAR; INTRAVENOUS AS NEEDED
Status: DISCONTINUED | OUTPATIENT
Start: 2021-11-19 | End: 2021-11-19 | Stop reason: HOSPADM

## 2021-11-19 RX ORDER — ROCURONIUM BROMIDE 10 MG/ML
INJECTION, SOLUTION INTRAVENOUS AS NEEDED
Status: DISCONTINUED | OUTPATIENT
Start: 2021-11-19 | End: 2021-11-19 | Stop reason: HOSPADM

## 2021-11-19 RX ORDER — MIDAZOLAM HYDROCHLORIDE 1 MG/ML
INJECTION, SOLUTION INTRAMUSCULAR; INTRAVENOUS AS NEEDED
Status: DISCONTINUED | OUTPATIENT
Start: 2021-11-19 | End: 2021-11-19 | Stop reason: HOSPADM

## 2021-11-19 RX ORDER — SODIUM CHLORIDE 0.9 % (FLUSH) 0.9 %
5-40 SYRINGE (ML) INJECTION AS NEEDED
Status: DISCONTINUED | OUTPATIENT
Start: 2021-11-19 | End: 2021-11-19 | Stop reason: HOSPADM

## 2021-11-19 RX ORDER — SODIUM CHLORIDE 0.9 % (FLUSH) 0.9 %
5-40 SYRINGE (ML) INJECTION EVERY 8 HOURS
Status: DISCONTINUED | OUTPATIENT
Start: 2021-11-19 | End: 2021-11-19 | Stop reason: HOSPADM

## 2021-11-19 RX ORDER — SCOLOPAMINE TRANSDERMAL SYSTEM 1 MG/1
1 PATCH, EXTENDED RELEASE TRANSDERMAL
Status: DISCONTINUED | OUTPATIENT
Start: 2021-11-19 | End: 2021-11-19 | Stop reason: HOSPADM

## 2021-11-19 RX ORDER — LIDOCAINE HYDROCHLORIDE 10 MG/ML
0.1 INJECTION, SOLUTION EPIDURAL; INFILTRATION; INTRACAUDAL; PERINEURAL AS NEEDED
Status: DISCONTINUED | OUTPATIENT
Start: 2021-11-19 | End: 2021-11-19 | Stop reason: HOSPADM

## 2021-11-19 RX ORDER — HYDROMORPHONE HYDROCHLORIDE 1 MG/ML
0.2 INJECTION, SOLUTION INTRAMUSCULAR; INTRAVENOUS; SUBCUTANEOUS
Status: DISCONTINUED | OUTPATIENT
Start: 2021-11-19 | End: 2021-11-19 | Stop reason: HOSPADM

## 2021-11-19 RX ORDER — DEXAMETHASONE SODIUM PHOSPHATE 4 MG/ML
INJECTION, SOLUTION INTRA-ARTICULAR; INTRALESIONAL; INTRAMUSCULAR; INTRAVENOUS; SOFT TISSUE AS NEEDED
Status: DISCONTINUED | OUTPATIENT
Start: 2021-11-19 | End: 2021-11-19 | Stop reason: HOSPADM

## 2021-11-19 RX ORDER — FENTANYL CITRATE 50 UG/ML
25 INJECTION, SOLUTION INTRAMUSCULAR; INTRAVENOUS
Status: DISCONTINUED | OUTPATIENT
Start: 2021-11-19 | End: 2021-11-19 | Stop reason: HOSPADM

## 2021-11-19 RX ORDER — LIDOCAINE HYDROCHLORIDE 20 MG/ML
INJECTION, SOLUTION EPIDURAL; INFILTRATION; INTRACAUDAL; PERINEURAL AS NEEDED
Status: DISCONTINUED | OUTPATIENT
Start: 2021-11-19 | End: 2021-11-19 | Stop reason: HOSPADM

## 2021-11-19 RX ORDER — SUCCINYLCHOLINE CHLORIDE 20 MG/ML
INJECTION INTRAMUSCULAR; INTRAVENOUS AS NEEDED
Status: DISCONTINUED | OUTPATIENT
Start: 2021-11-19 | End: 2021-11-19 | Stop reason: HOSPADM

## 2021-11-19 RX ORDER — CEFAZOLIN SODIUM 1 G/3ML
INJECTION, POWDER, FOR SOLUTION INTRAMUSCULAR; INTRAVENOUS AS NEEDED
Status: DISCONTINUED | OUTPATIENT
Start: 2021-11-19 | End: 2021-11-19

## 2021-11-19 RX ORDER — ACETAMINOPHEN 325 MG/1
650 TABLET ORAL ONCE
Status: COMPLETED | OUTPATIENT
Start: 2021-11-19 | End: 2021-11-19

## 2021-11-19 RX ORDER — KETOROLAC TROMETHAMINE 30 MG/ML
INJECTION, SOLUTION INTRAMUSCULAR; INTRAVENOUS AS NEEDED
Status: DISCONTINUED | OUTPATIENT
Start: 2021-11-19 | End: 2021-11-19 | Stop reason: HOSPADM

## 2021-11-19 RX ORDER — OXYCODONE AND ACETAMINOPHEN 5; 325 MG/1; MG/1
1 TABLET ORAL AS NEEDED
Status: DISCONTINUED | OUTPATIENT
Start: 2021-11-19 | End: 2021-11-19 | Stop reason: HOSPADM

## 2021-11-19 RX ORDER — ONDANSETRON 2 MG/ML
INJECTION INTRAMUSCULAR; INTRAVENOUS AS NEEDED
Status: DISCONTINUED | OUTPATIENT
Start: 2021-11-19 | End: 2021-11-19 | Stop reason: HOSPADM

## 2021-11-19 RX ORDER — MORPHINE SULFATE 2 MG/ML
2 INJECTION, SOLUTION INTRAMUSCULAR; INTRAVENOUS
Status: DISCONTINUED | OUTPATIENT
Start: 2021-11-19 | End: 2021-11-19 | Stop reason: HOSPADM

## 2021-11-19 RX ORDER — SODIUM CHLORIDE 9 MG/ML
25 INJECTION, SOLUTION INTRAVENOUS CONTINUOUS
Status: DISCONTINUED | OUTPATIENT
Start: 2021-11-19 | End: 2021-11-19 | Stop reason: HOSPADM

## 2021-11-19 RX ORDER — SCOLOPAMINE TRANSDERMAL SYSTEM 1 MG/1
1 PATCH, EXTENDED RELEASE TRANSDERMAL ONCE
Status: DISCONTINUED | OUTPATIENT
Start: 2021-11-19 | End: 2021-11-19 | Stop reason: HOSPADM

## 2021-11-19 RX ORDER — PROPOFOL 10 MG/ML
INJECTION, EMULSION INTRAVENOUS AS NEEDED
Status: DISCONTINUED | OUTPATIENT
Start: 2021-11-19 | End: 2021-11-19 | Stop reason: HOSPADM

## 2021-11-19 RX ORDER — ONDANSETRON 2 MG/ML
4 INJECTION INTRAMUSCULAR; INTRAVENOUS AS NEEDED
Status: DISCONTINUED | OUTPATIENT
Start: 2021-11-19 | End: 2021-11-19 | Stop reason: HOSPADM

## 2021-11-19 RX ADMIN — MIDAZOLAM 2 MG: 1 INJECTION INTRAMUSCULAR; INTRAVENOUS at 08:21

## 2021-11-19 RX ADMIN — PROPOFOL 200 MG: 10 INJECTION, EMULSION INTRAVENOUS at 08:30

## 2021-11-19 RX ADMIN — FENTANYL CITRATE 50 MCG: 50 INJECTION, SOLUTION INTRAMUSCULAR; INTRAVENOUS at 08:30

## 2021-11-19 RX ADMIN — KETOROLAC TROMETHAMINE 30 MG: 30 INJECTION, SOLUTION INTRAMUSCULAR; INTRAVENOUS at 08:54

## 2021-11-19 RX ADMIN — ROCURONIUM BROMIDE 5 MG: 10 SOLUTION INTRAVENOUS at 08:30

## 2021-11-19 RX ADMIN — SUCCINYLCHOLINE CHLORIDE 180 MG: 20 INJECTION, SOLUTION INTRAMUSCULAR; INTRAVENOUS at 08:31

## 2021-11-19 RX ADMIN — SODIUM CHLORIDE, SODIUM LACTATE, POTASSIUM CHLORIDE, AND CALCIUM CHLORIDE: 600; 310; 30; 20 INJECTION, SOLUTION INTRAVENOUS at 08:19

## 2021-11-19 RX ADMIN — FENTANYL CITRATE 50 MCG: 50 INJECTION, SOLUTION INTRAMUSCULAR; INTRAVENOUS at 08:41

## 2021-11-19 RX ADMIN — SODIUM CHLORIDE, POTASSIUM CHLORIDE, SODIUM LACTATE AND CALCIUM CHLORIDE 125 ML/HR: 600; 310; 30; 20 INJECTION, SOLUTION INTRAVENOUS at 07:44

## 2021-11-19 RX ADMIN — DEXAMETHASONE SODIUM PHOSPHATE 6 MG: 4 INJECTION, SOLUTION INTRAMUSCULAR; INTRAVENOUS at 08:39

## 2021-11-19 RX ADMIN — ACETAMINOPHEN 650 MG: 325 TABLET ORAL at 07:57

## 2021-11-19 RX ADMIN — ONDANSETRON HYDROCHLORIDE 4 MG: 2 INJECTION, SOLUTION INTRAMUSCULAR; INTRAVENOUS at 08:39

## 2021-11-19 RX ADMIN — LIDOCAINE HYDROCHLORIDE 80 MG: 20 INJECTION, SOLUTION EPIDURAL; INFILTRATION; INTRACAUDAL; PERINEURAL at 08:30

## 2021-11-19 NOTE — ANESTHESIA POSTPROCEDURE EVALUATION
Post-Anesthesia Evaluation and Assessment    Patient: Vishal Jimenez MRN: [id number]  SSN: [id number]    YOB: 1970  Age: 49 y.o. Sex: female      I have evaluated the patient and they are stable and ready for discharge from the PACU. Cardiovascular Function/Vital Signs  Visit Vitals  /73   Pulse 79   Temp 36.6 °C (97.8 °F)   Resp 20   Ht 5' 10\" (1.778 m)   Wt 149.7 kg (330 lb 0.5 oz)   SpO2 100%   BMI 47.35 kg/m²       Patient is status post General anesthesia for Procedure(s): HYSTEROSCOPY/DILATATION AND CURETTAGE WITH MYOSURE/MIRENA IUD INSERTION. Nausea/Vomiting: None    Postoperative hydration reviewed and adequate. Pain:  Pain Scale 1: Numeric (0 - 10) (11/19/21 0914)  Pain Intensity 1: 0 (11/19/21 0914)   Managed    Neurological Status:   Neuro (WDL): Exceptions to WDL (11/19/21 0914)  Neuro  Neurologic State: Drowsy (easily arousable) (11/19/21 3729)  Orientation Level: Oriented X4 (11/19/21 0914)   At baseline    Mental Status, Level of Consciousness: Alert and  oriented to person, place, and time    Pulmonary Status:   O2 Device: CO2 nasal cannula (11/19/21 0914)   Adequate oxygenation and airway patent    Complications related to anesthesia: None    Post-anesthesia assessment completed. No concerns    Signed By: Aline Christopher MD     November 19, 2021              Procedure(s): HYSTEROSCOPY/DILATATION AND CURETTAGE WITH MYOSURE/MIRENA IUD INSERTION.     general    <[email or web address]>    INITIAL Post-op Vital signs:   Vitals Value Taken Time   /73 11/19/21 0914   Temp 36.6 °C (97.8 °F) 11/19/21 0914   Pulse 79 11/19/21 0914   Resp 20 11/19/21 0914   SpO2 100 % 11/19/21 0914

## 2021-11-19 NOTE — ANESTHESIA PREPROCEDURE EVALUATION
Relevant Problems   CARDIOVASCULAR   (+) Hypertension      ENDOCRINE   (+) Morbid obesity (HCC)      HEMATOLOGY   (+) Thalassemia      PERSONAL HX & FAMILY HX OF CANCER   (+) Breast cancer (HCC)       Anesthetic History   No history of anesthetic complications  PONV          Review of Systems / Medical History  Patient summary reviewed, nursing notes reviewed and pertinent labs reviewed    Pulmonary  Within defined limits          Asthma : well controlled       Neuro/Psych   Within defined limits           Cardiovascular  Within defined limits  Hypertension: well controlled              Exercise tolerance: >4 METS     GI/Hepatic/Renal  Within defined limits   GERD           Endo/Other  Within defined limits      Obesity and arthritis     Other Findings   Comments: Breast cancer ([address]      Esophageal spasm     Fibromyalgia     IBS (irritable bowel syndrome)     Osteoarthritis     Thalassemia              Physical Exam    Airway  Mallampati: II  TM Distance: > 6 cm  Neck ROM: normal range of motion   Mouth opening: Normal     Cardiovascular  Regular rate and rhythm,  S1 and S2 normal,  no murmur, click, rub, or gallop             Dental  No notable dental hx       Pulmonary  Breath sounds clear to auscultation               Abdominal  GI exam deferred       Other Findings            Anesthetic Plan    ASA: 3  Anesthesia type: general          Induction: Intravenous  Anesthetic plan and risks discussed with: Patient

## 2021-11-19 NOTE — DISCHARGE INSTRUCTIONS
[address] [facility] [address], [address] [phone]  F [phone]     [facility]      Dear Ms. Flossie Hamman,      Please review your instructions with your nurse and ask any questions so you have all the information you need to recover well at home. If you do not feel you have everything you need to succeed and be safe after you leave the hospital, please discuss these concerns with your nurse. As always, call for any questions at home. Your doctor: Irvin Navarro MD   Diagnosis: POSTMENOPAUSAL BLEEDING  Procedure: Procedure(s): HYSTEROSCOPY/DILATATION AND CURETTAGE WITH MYOSURE/MIRENA IUD INSERTION  Date of Discharge: 11/19/21       Take Home Medications     See Discharge Medication Review provided to you by your nurse. If you did not receive one, request this prior to your discharge. · It is important that you take your medications as they are prescribed. · Keep your medications in the bottles provided by the pharmacist and keep a list of the medication names, dosages, times to be taken and what they are for in your wallet. · Do not take other medications without consulting your doctor. Activity    · If possible, have someone with you at all times until you feel stable. · Gradually increase your activity each day. There are generally no restrictions on walking, climbing stairs or riding in a car. Try to walk at least 4 times per day. · Showers are okay. If you have an incision, no tub bathing/swimming for two weeks. Causes For Concern    If any of the following occur, please call our office and speak with the Nurse/aid who will help you with your problem or ask the doctor to call you.  Problems with the incision, including increasing pain, swelling, redness or drainage.  Inability to pass urine    Increasing abdominal pain despite medication   Persisting nausea or vomiting.     Fever or chills and a temperature >101F   Constipation (no bowel movement for three days).  Diarrhea (more than three watery stools within 24 hours).  Excessive vaginal or wound bleeding.  If after hours and you cannot reach an on-call physician, call 911. Follow-Up    Call [phone] to schedule an appointment with Isaac Devlin MD  in 2 weeks via virtual visit to discuss pathology. Information obtained by :  I understand that if any problems occur once I am at home I am to contact my physician. I understand and acknowledge receipt of the instructions indicated above. Physician's or R.N.'s Signature                                                                  Date/Time                                                                                                                                              Patient or Representative Signature                                                          Date/Time      ______________________________________________________________________    Anesthesia Discharge Instructions    After general anesthesia or intervenous sedation, for 24 hours or while taking prescription Narcotics:  · Limit your activities  · Do not drive or operate hazardous machinery  · If you have not urinated within 8 hours after discharge, please contact your surgeon on call. · Do not make important personal or business decisions  · Do not drink alcoholic beverages    Report the following to your surgeon:  · Excessive pain, swelling, redness or odor of or around the surgical area  · Temperature over 100.5 degrees  · Nausea and vomiting lasting longer than 4 hours or if unable to take medication  · Any signs of decreased circulation or nerve impairment to extremity:  Change in color, persistent numbness, tingling, coldness or increased pain.   · Any questions      YOU HAD TYLENOL (650MG) AT 7:57AM  YOU HAD TORADOL (NSAID MEDICATION, SAME FAMILY AS MOTRIN) AT 8:54AM

## 2021-11-21 NOTE — OP NOTES
Gynecologic Oncology Operative Report    Becca Spence  11/21/2021    Pre-operative dx:  1) Postmenopausal bleeding; 2) Thickened endometrium     Post-operative dx:  1) Postmenopausal bleeding; 2) Thickened endometrium     Procedure:    Pelvic exam under anesthesia, cervical dilation, hysteroscopy, MyoSure curetting/polypectomy, Mirena IUD placement     Surgeon:  Dalila Lopez MD     Assistant:  n/a     Anesthesia:  GETA    EBL:  minimal    Antibiotics: not indicated     VTE Prophylaxis: SCDs     Complications:  None    Implants: None     Specimens:  Endometrial and endocervical curettings     Operative indications:  49 y.o. female with PMB and thickened endometrial stripe    Operative findings: On hysteroscopy, the endometrial cavity was overall normal appearing and slightly atrophic. No obvious pathology noted. Cervical canal was normal appearing as well. Operative report: After informed consent was obtained, the patient was taken to the operating room where anesthesia was administered and deemed adequate. The patient was positioned in the dorsal lithotomy position in [address] [place] [address]. Time-out was performed. The patient was prepped and draped in the normal sterile fashion. In-and-out urethral catheterization was performed. The patient was examined under anesthesia and the above findings were noted. A bivalve speculum was placed in the patient's vagina. The anterior lip of the cervix was visualized and grasped with a single-toothed tenaculum. Using Pino Sailaja dilators, the cervix was dilated with #25 without difficulty. The uterus was sounded to 7-cm. Using saline as the insufflating medium, the hysteroscope was introduced into the uterine cavity. The entire cavity was inspected and the above findings noted. The MyoSure device was then inserted into the uterine cavity and the entire endometrial cavity sampled. The hysteroscope was then removed. An endocervical curettage was then performed.      A Mirena IUD was placed at the end of the procedure in the standard fashion and without difficulty; and the strings were trimmed at the level of the cervix. The endocervical and endometrial curettings were sent to pathology separately for permanent pathology. The tenaculum was removed from the cervix and silver nitrate applied with good hemostasis. The speculum was removed. Instrument, sponge and needle counts were correct x2. She was extubated in the operating room and taken to the recovery room in stable condition.      Luisana Zuleta MD  11/19/2021

## 2021-11-24 ENCOUNTER — HOSPITAL ENCOUNTER (OUTPATIENT)
Dept: PHYSICAL THERAPY | Age: 51
Discharge: HOME | End: 2021-11-24
Payer: COMMERCIAL

## 2021-11-24 PROCEDURE — 97110 THERAPEUTIC EXERCISES: CPT

## 2021-11-24 PROCEDURE — 97140 MANUAL THERAPY 1/> REGIONS: CPT

## 2021-11-24 NOTE — PROGRESS NOTES
LYMPHEDEMA PT DAILY TREATMENT NOTE - [facility] 2-15    Patient Name: Elisha Callejas  Date:2021  : 1970  [x]  Patient  Verified  Payor: Eliud Diehl / Plan: [address] HMO / Product Type: HMO /    In time:11:00am Out time:12:40pm  Total Treatment Time (min):100  Total Timed Codes (min): 100  1:1 Treatment Time ( [address] only): 100  Visit #: 2    Treatment Area: I89.0 Lymphedema    SUBJECTIVE  Pain Level (0-10 scale): Soreness in R upper arm from Covid Booster and Flu vaccine. 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 Any medication changes, allergies to medications, adverse drug reactions, diagnosis change, or new procedure performed?: [x] No    [] Yes (see summary sheet for update)  Subjective functional status/changes:   [x] No changes reported  Patient reports that she was feeling much better after session with improved motion of the L UE.     OBJECTIVE  22 min Therapeutic Exercise:  [] [place] [facility] Exercises [] Remedial Lymphedema Exercise Program [x] Axillary Web Exercise Program      [x] Shoulder ROM Exercises (Wall stretches, PNF patterns, sidelying abduction/flexion) Demonstrated pulleys. Rationale: Activate muscle pump to improve lymphatic fluid movement and decrease swelling to improve the patients ability to perform ADL and IADL skills and prevent worsening of swelling over time. 78 min Manual Therapy  Manual Lymphatic Drainage (MLD):  Area to decongest: UE: left and trunk   Sequence used and effectiveness: Perform soft tissue mobilization to address axillary web syndrome in upper extremity and lateral trunk.      Skin/wound care/debridement: Reviewed skin care principles:   Skin care products  Hygiene  Prevention of cellulitis   Applied multi-layer compression bandaging to: Deferred    Upper/Lower Extremity Compression: Decided on the following compression products to submit for insurance authorization:     UE: left  Trunk  LE: bilateral lower extremity: Style: Flat-knit and Foam based    Brand: Medi Mondi L UE sleeve and glove/knee highs  Solaris Tribute for L UE and B LE below knee  White Wear Style compression bras    Type: Custom:   Non-Custom: Size:  TBD once authorization is approved    Vendor: [facility]    Education: Educated patient in compression garment donning and doffing. Glove use with donning. Daily wear schedule. Daily laundering. Garment lifespan. Return and reordering process (by bringing prior garments into the clinic). Educated patient to monitor for redness or pressure points on the skin. If new pain occurs they should contact their therapist.    Will educate on products when they arrive to be fitted. Patient is familiar with most products that she has used in the past.      Rationale: decrease pain, increase ROM and decrease edema  to improve the patients ability to prevent worsening of swelling and axillary web syndrome over time. With   [x] TE   [] TA   [x] MT   [] SC   [] other: Patient Education: [x] Review HEP    [x] MLD Patient Education Continued education in self MLD technique with bathing and skin care. Educated patient in soft tissue mobilization for axillary web. Dycem provided. [x] Progressed/Changed HEP based on:   [x] positioning   [] Kinesiotape   [x] Skin care   [] wound care   [x] other: Axillary web stretching   [x]Patient/caregiver re-demonstrated bandaging. [] Yes  [] No  Compression bandaging/garment precautions reviewed: [] Yes  [] No     Other Objective/Functional Measures:   Trunk Girths  Axilla-122 cm  Chest-134 cm  Xiphoid-118 cm    Pain Level (0-10 scale) post treatment: 0/10      ASSESSMENT/Changes in Function:   Patient able to have relief and improvement with motion with axillary web mobilization and stretching today.  Patient was agreeable to pick out day and night times for her L UE, Trunk, and B LEs today to start process to get authorization with the insurance company for her needed compression. Patient continues to utilize her Lymphapress multiple times daily with no significant improvement. Patient will work on stretching and using dycem on cords and scar line before next visit to see how she does with self stretching in the home setting. Patient will continue to benefit from skilled PT services to  address ROM deficits, address swelling, analyze and address soft tissue restrictions, analyze compression product fit and use, instruct in home lymphedema management program, measure for compression products and modify and progress therapeutic interventions to attain remaining goals. [x]  See Plan of Care  []  See progress note/recertification  []  See Discharge Summary        Progress towards goals / Updated goals:  GOALS  Short term goals  Time frame:  12/08/2021  1. Patient will demonstrate knowledge of signs/symptoms of infections/cellulitis and be independent in skin care to prevent cellulitis. Progressing towards goal.   2.  Patient will demonstrate independence in lymphedema home program of therapeutic exercises to improve circulation and decongest limb to improve ADLs. Progressing towards goal.   3.  Lower extremity limb volumes will be assessed to monitor for changes over time. 4.  Patient will be measured for appropriate upper and lower extremity compression garments to prevent worsening of swelling over time. Progressing towards goal.      Long term goals  Time frame: 2/28/2021  1. Pt will show improvement in the Lymphedema Life Impact Scale (LLIS) by decreasing the score to 12% and thus allow improvement in patient's quality of life. 2.  Patient will be independent with don/doff of compression system and use in order to prevent reaccumulation of fluid at discharge. 3.  Pt will be independent in self-MLD and show stable limb volumes showing decongestion and pt. ready for transition to independent restorative phase of lymphedema therapy.    PLAN  []  Upgrade activities as tolerated     [x]  Continue plan of care  []  Update interventions per flow sheet       []  Discharge due to:_  []  Other:_      Georges MCARTHUR Patch, PTA, CLT 11/24/2021

## 2021-11-29 ENCOUNTER — TELEPHONE (OUTPATIENT)
Dept: GYNECOLOGY | Age: 51
End: 2021-11-29

## 2021-11-29 DIAGNOSIS — N93.9 VAGINAL BLEEDING: Primary | ICD-10-CM

## 2021-11-29 RX ORDER — MEGESTROL ACETATE 20 MG/1
20 TABLET ORAL DAILY
Qty: 30 TABLET | Refills: 0 | Status: SHIPPED | OUTPATIENT
Start: 2021-11-29 | End: 2021-12-21

## 2021-11-29 NOTE — TELEPHONE ENCOUNTER
I spoke with patient, she states she is having vaginal bleedings, she is wearing an overnight pad and having to change it 4 to 5 times per day, it is going beyond the pad and getting blood on her underwear, no nausea or vomiting, no fever or chills, no pain, per Dr. Samantha Reese, probably from the [facility] , will send megace 20 mg per day for 30 days to help the bleeding stop, will send to [facility] on [address]

## 2021-11-30 ENCOUNTER — APPOINTMENT (OUTPATIENT)
Dept: PHYSICAL THERAPY | Age: 51
End: 2021-11-30
Payer: COMMERCIAL

## 2021-12-07 ENCOUNTER — HOSPITAL ENCOUNTER (OUTPATIENT)
Dept: PHYSICAL THERAPY | Age: 51
Discharge: HOME | End: 2021-12-07
Payer: COMMERCIAL

## 2021-12-07 PROCEDURE — 97110 THERAPEUTIC EXERCISES: CPT

## 2021-12-07 PROCEDURE — 97140 MANUAL THERAPY 1/> REGIONS: CPT

## 2021-12-07 NOTE — PROGRESS NOTES
LYMPHEDEMA PT DAILY TREATMENT NOTE - [facility] 2-15  30 Day Reassessment   Patient Name: Juan Miguel Banegas  Date:2021  : 1970  [x]  Patient  Verified  Payor: Sandy Benoit / Plan: [address] HMO / Product Type: HMO /    In time:12:55pm Out time:2:30pm  Total Treatment Time (min):95  Total Timed Codes (min): 95  1:1 Treatment Time ([facility] only): 95  Visit #: 3    Treatment Area: I89.0 Lymphedema    SUBJECTIVE  Pain Level (0-10 scale): Patient now with low back pain and will be going for outpatient PT to address this issue. Patient reports that the pain is fairly constant, but increases to 6/10 at times. 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 Any medication changes, allergies to medications, adverse drug reactions, diagnosis change, or new procedure performed?: [x] No    [] Yes (see summary sheet for update)  Subjective functional status/changes:   [x] No changes reported  Patient reports that she feels that her range of motion is improving in her L UE with less issues with pain, just tightness. Currently her lower back is her biggest limiting factor and she will be starting PT for this soon. OBJECTIVE  13 min Therapeutic Exercise:  [] Emma Simper Exercises [] Remedial Lymphedema Exercise Program [x] Axillary Web Exercise Program      [x] Shoulder ROM Exercises (Wall stretches, PNF patterns, sidelying abduction/flexion)   Rationale: Activate muscle pump to improve lymphatic fluid movement and decrease swelling to improve the patients ability to perform ADL and IADL skills and prevent worsening of swelling over time. 82 min Manual Therapy  Manual Lymphatic Drainage (MLD):  Area to decongest: UE: left and trunk   Sequence used and effectiveness: Modifications were made to manual lymph drainage sequence to exclude cervical techniques secondary to patient's age. Secondary sequence for upper extremity with trunk involvement.   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 Skin/wound care/debridement: Reviewed skin care principles:   Skin care products  Hygiene  Prevention of cellulitis   Applied multi-layer compression bandaging to: Deferred    Upper/Lower Extremity Compression: Measured for the following products:     UE: left  Trunk  LE: bilateral lower extremity:     Style: Flat-knit and Foam based    Brand: Medi Mondi L UE sleeve and glove/knee highs (CCL 2 )  Solaris Tribute for L UE and B LE below knee  Invoiceable Style compression bras Kristyn and Prima with extenders     Type: Custom: UE and LE garments Non-Custom: Size: 2X Large with extender     Vendor: [employer]    Education: Educated patient in compression garment donning and doffing. Glove use with donning. Daily wear schedule. Daily laundering. Garment lifespan. Return and reordering process (by bringing prior garments into the clinic). Educated patient to monitor for redness or pressure points on the skin. If new pain occurs they should contact their therapist.    Will educate on products when they arrive to be fitted. Patient is familiar with most of the products as she she has used compression prodcuts in the past.   Order was placed and vendor will contact patient with any questions regarding coverage. Rationale: decrease pain, increase ROM and decrease edema  to improve the patients ability to prevent worsening of swelling and axillary web syndrome over time. With   [x] TE   [] TA   [x] MT   [] SC   [] other: Patient Education: [x] Review HEP    [x] MLD Patient Education Continued education in self MLD technique with bathing and skin care. Educated patient in soft tissue mobilization for axillary web. Dycem provided. [x] Progressed/Changed HEP based on:   [x] positioning   [] Kinesiotape   [x] Skin care   [] wound care   [x] other: Axillary web stretching   [x]Patient/caregiver re-demonstrated bandaging.  [] Yes  [] No  Compression bandaging/garment precautions reviewed: [] Yes  [] No     Other Objective/Functional Measures: Will assess volumes on upcoming visit. Focused on measuring for compression garment order today. Trunk Girths  Axilla-119 cm down from last visit 122 cm  Chest-136 cm up from last visit 134 cm  Xiphoid-118 cm no change from last visit 118 cm    Pain Level (0-10 scale) post treatment: 4/10 low back    ASSESSMENT/Changes in Function:   Therapist able to complete measurements for compression garment order today. Patient doing better with her range of motion of the L UE and mobility of the chest and lateral trunk/axilla on the left. Patient has met short term goals 1 and 4. Today is patient's 3rd visit so will extend remaining short term goals and continue towards long term goals. Her attendance frequency may be affected as she will start outpatient PT soon for her low back. She will return for follow up next week. Patient will continue to benefit from skilled PT services to  address ROM deficits, address swelling, analyze and address soft tissue restrictions, analyze compression product fit and use, instruct in home lymphedema management program and modify and progress therapeutic interventions to attain remaining goals. [x]  See Plan of Care  []  See progress note/recertification  []  See Discharge Summary        Progress towards goals / Updated goals:  GOALS  Short term goals  Time frame:  12/08/2021  1. Patient will demonstrate knowledge of signs/symptoms of infections/cellulitis and be independent in skin care to prevent cellulitis. Goal Met 12/7/2021  2. Patient will demonstrate independence in lymphedema home program of therapeutic exercises to improve circulation and decongest limb to improve ADLs. Progressing towards goal. Will extend to 2/28/2021  3. Lower extremity limb volumes will be assessed to monitor for changes over time. Will extend goal to 2/28/2021.   4.  Patient will be measured for appropriate upper and lower extremity compression garments to prevent worsening of swelling over time. Goal Met 12/7/2021     Long term goals  Time frame: 2/28/2021  1. Pt will show improvement in the Lymphedema Life Impact Scale (LLIS) by decreasing the score to 12% and thus allow improvement in patient's quality of life. 2.  Patient will be independent with don/doff of compression system and use in order to prevent reaccumulation of fluid at discharge. 3.  Pt will be independent in self-MLD and show stable limb volumes showing decongestion and pt. ready for transition to independent restorative phase of lymphedema therapy.    PLAN  []  Upgrade activities as tolerated     [x]  Continue plan of care  []  Update interventions per flow sheet       []  Discharge due to:_  []  Other:_     Georges Flowers, PTA, CLT 12/7/2021  VISHNU Brown, CLCHRISTINE

## 2021-12-13 ENCOUNTER — OFFICE VISIT (OUTPATIENT)
Dept: INTERNAL MEDICINE CLINIC | Age: 51
End: 2021-12-13
Payer: COMMERCIAL

## 2021-12-13 VITALS
SYSTOLIC BLOOD PRESSURE: 148 MMHG | OXYGEN SATURATION: 99 % | WEIGHT: 293 LBS | RESPIRATION RATE: 14 BRPM | BODY MASS INDEX: 41.95 KG/M2 | HEIGHT: 70 IN | HEART RATE: 90 BPM | DIASTOLIC BLOOD PRESSURE: 82 MMHG | TEMPERATURE: 97.8 F

## 2021-12-13 DIAGNOSIS — R09.81 SINUS CONGESTION: ICD-10-CM

## 2021-12-13 DIAGNOSIS — N95.0 PMB (POSTMENOPAUSAL BLEEDING): ICD-10-CM

## 2021-12-13 DIAGNOSIS — I10 ESSENTIAL HYPERTENSION: Primary | ICD-10-CM

## 2021-12-13 PROCEDURE — 99214 OFFICE O/P EST MOD 30 MIN: CPT | Performed by: INTERNAL MEDICINE

## 2021-12-13 RX ORDER — DILTIAZEM HYDROCHLORIDE 180 MG/1
180 CAPSULE, COATED, EXTENDED RELEASE ORAL DAILY
Qty: 30 CAPSULE | Refills: 5 | Status: SHIPPED | OUTPATIENT
Start: 2021-12-13 | End: 2022-06-28

## 2021-12-13 NOTE — PROGRESS NOTES
[address] [place] [address] [address], [address] [facility]  P [phone]  F [phone]    Office Note  Patient ID:  Name:  Aj Swift  MRN:  [id number]  :  1970/51 y.o. Date:  2021      HISTORY OF PRESENT ILLNESS:  Ms. Aj Swift is a 49 y.o. female with persistent PMB with a history of breast cancer. Underwent a hysteroscopy/D&C with Dr. Jaziel Capps, Gynecologic Oncology in [place] in 2020. Her pathology was consistent with \"focally disordered proliferative endometrium. Benign endocervical mucosa. \"     On 2021 underwent Pelvic exam under anesthesia, cervical dilation, hysteroscopy, MyoSure curetting/polypectomy, Mirena IUD placement. Final pathology consistent with benign proliferative endometrium. Presents today for postoperative visit. Doing well without complaints. Did have a few days of heavy bleeding about 1-2 weeks after her D&C. This is now just spotting. Initial History:  Ms. Aj Swift is a 49 y.o.  postmenopausal female who presents as a new patient from Dr. Anton Alfaro for vaginal bleeding/spotting. The patient has a history of triple negative breast cancer diagnosed in 2016 s/p lumpectomy/AC-T chemo and radiation. The patient reports menopause a few years ago while undergoing treatment for her breast cancer. She presented last year with vaginal spotting/bleeding and underwent a hysteroscopy/D&C with Dr. Jaziel Capps, Gynecologic Oncology in [place]. Her pathology was consistent with \"focally disordered proliferative endometrium. Benign endocervical mucosa. \"     She is now followed by Dr. Anton Alfaro for her breast cancer. She is referred to our office for continued vaginal spotting/bleeding. Interval History:  Presents back today for ultrasound results. Pertinent PMH/PSH: breast cancer, HTN, obesity, IBS      Active, no restrictions.      Imaging Review:  Pelvic ultrasound 11/3/2021:  FINDINGS: TRANSABDOMINAL:  The uterus measures 10.2 x 5.3 x 8.3 cm. The endometrial stripe is thickened  measuring 10 mm. The uterus otherwise appears normal.  The bilateral ovaries are not well seen due to overlying bowel gas. TRANSVAGINAL:  The uterus measures 8.7 x 5.5 x 6.9 cm. The endometrial stripe is thickened  measuring 12 mm. The uterus otherwise appears normal and anteverted. The bilateral ovaries are not well seen due to overlying bowel gas. No free fluid in the pelvis. IMPRESSION  1. Thickened endometrial stripe in a postmenopausal patient, with differential  considerations of endometrial hyperplasia, carcinoma, and polyp. Recommend  endometrial biopsy. Pathology Review:   11/2/2020:  A. ECC: benign endocervical mucosa with chronic inflammation  B. Endometrium: focally disordered proliferative endometrium. Benign endocervical mucosa. ROS:  A comprehensive review of systems was negative except for that written in the History of Present Illness.  , 10 point ROS      ECOG stGstrstastdstest:st st1st Problem List:  Patient Active Problem List    Diagnosis Date Noted    PMB (postmenopausal bleeding) 10/26/2021    Thalassemia     Hypertension     IBS (irritable bowel syndrome)     Esophageal spasm     Morbid obesity ([address]     Diverticulosis     Diverticulitis     Fibromyalgia     Osteoarthritis     Breast cancer ([address]      PMH:  Past Medical History:   Diagnosis Date    Asthma     AS A CHILD    Breast cancer ([address]     LEFT    Diverticulitis     Diverticulosis     Esophageal spasm     Fibromyalgia     GERD (gastroesophageal reflux disease)     Heart murmur     Hypertension     IBS (irritable bowel syndrome)     Lymphedema     LEFT ARM    Morbid obesity (HCC)     Nausea & vomiting     Osteoarthritis     PUD (peptic ulcer disease)     YEARS AGO    Thalassemia       PSH:  Past Surgical History:   Procedure Laterality Date    HX BARIATRIC SURGERY  10/2010    lap band    HX BREAST LUMPECTOMY Left 07/2016    HX COLONOSCOPY  2017    HX DILATION AND CURETTAGE  11/2020    HX GASTRIC BYPASS  10/2010    LAP BAND    HX ORTHOPAEDIC  10/2014    right foot and ankle reconstruction for flat feet    HX ORTHOPAEDIC Bilateral 2008    knee arthroscopy    HX ORTHOPAEDIC Left 2006    HEEL surgery    HX TONSILLECTOMY        Social History:  Social History     Tobacco Use    Smoking status: Never Smoker    Smokeless tobacco: Never Used   Substance Use Topics    Alcohol use: Yes     Comment: 1 time a year      Family History:  Family History   Problem Relation Age of Onset    OSTEOARTHRITIS Mother     Thalassemia Mother     Hypertension Mother     Heart Disease Father         chf    Cancer Father         lung    Hypertension Father     Arthritis Father     Arthritis-rheumatoid Brother     Hypertension Brother     Hypertension Brother     Elevated Lipids Brother     OSTEOARTHRITIS Brother     No Known Problems Sister     Anesth Problems Neg Hx       Medications: (reviewed)  Current Outpatient Medications   Medication Sig    dilTIAZem ER (CARDIZEM CD) 180 mg capsule Take 1 Capsule by mouth daily.  megestroL (MEGACE) 20 mg tablet Take 1 Tablet by mouth daily for 30 days.  polyethylene glycol (Miralax) 17 gram packet 1 packet mixed with 8 ounces of fluid    aspirin-acetaminophen-caffeine (Excedrin Extra Strength) 250-250-65 mg per tablet 2 tablets    wheat dextrin/calcium/aspartam (BENEFIBER + CALCIUM SUGAR-FREE PO) as directed.  syringe with needle 1 mL 22 gauge x 1 1/2\" syrg 1 Each by Does Not Apply route every seven (7) days. For allergy injections (Patient taking differently: 1 Each by Does Not Apply route every seven (7) days. For allergy injections  LAST DOSE: 11/17/21)    propylene glycoL (Systane Balance) 0.6 % drop Apply 1 Drop to eye two (2) times daily as needed.  biotin 10,000 mcg cap Take 1 Tablet by mouth daily.     cholecalciferol (VITAMIN D3) (5000 Units/125 mcg) tab tablet Take 5,000 Units by mouth daily.  clotrimazole-betamethasone (LOTRISONE) topical cream Apply  to affected area as needed.  cyanocobalamin 1,000 mcg tablet Take 100 mcg by mouth daily.  docusate sodium (COLACE) 100 mg capsule Take 100 mg by mouth two (2) times a day.  DULoxetine (CYMBALTA) 60 mg capsule duloxetine 60 mg capsule,delayed release   Take 1 capsule every day by oral route.  folic acid 912 mcg tablet Take 400 mcg by mouth daily.  loratadine (CLARITIN) 10 mg tablet Take 10 mg by mouth.  losartan-hydroCHLOROthiazide (HYZAAR) 100-12.5 mg per tablet losartan 100 mg-hydrochlorothiazide 12.5 mg tablet   Take 1 tablet every day by oral route.  montelukast (SINGULAIR) 10 mg tablet montelukast 10 mg tablet   TAKE 1 TABLET BY MOUTH EVERY DAY AT NIGHT    multivitamin capsule Take 1 Capsule by mouth daily.  azelastine-fluticasone (Dymista) 137-50 mcg/spray spry 1 Spray by Nasal route two (2) times a day. No current facility-administered medications for this visit. Allergies: (reviewed)  Allergies   Allergen Reactions    Other Food Sneezing    Adhesive Other (comments)     Tears skin off. Severe.  Oxycodone Itching    Lisinopril Other (comments)     \"constant tickle in my throat\"  \"constant tickle in my throat\"      Mold Other (comments)    Nickel Other (comments)    Sulfa (Sulfonamide Antibiotics) Other (comments)    Sulfamethoxazole-Trimethoprim Hives    Anesthetics - Amide Type - Select Amino Amides Nausea Only        OBJECTIVE:    Physical Exam:  VITAL SIGNS: Vitals:    12/14/21 1413   BP: (!) 152/105   Pulse: 90   Weight: 333 lb 3.2 oz (151.1 kg)   Height: 5' 10\" (1.778 m)     Body mass index is 47.81 kg/m². GENERAL GEOVANNI: Conversant, alert, oriented. No acute distress. HEENT: HEENT. No thyroid enlargement. No JVD. Neck: Supple without restrictions. RESPIRATORY: Clear to auscultation and percussion to the bases. No CVAT. CARDIOVASC: RRR without murmur/rub. GASTROINT: soft, non-tender, without masses or organomegaly, obese   MUSCULOSKEL: no joint tenderness, deformity or swelling       EXTREMITIES: extremities normal, atraumatic, no cyanosis or edema   PELVIC: Exam chaperoned by nurse. Normal appearing external genitalia. On speculum exam, normal appearing vagina and cervix. IUD strings visualized at cervical os. Deferred bimanual exam: On bimanual exam, the cervix and uterus are normal size and mobile. No evidence of adnexal masses or nodularity. RECTAL: deferred   LLOYD SURVEY: No suspicious lymphadenopathy or edema noted. NEURO: Grossly intact. No acute deficit. Lab Date as available:    Lab Results   Component Value Date/Time    WBC 8.7 12/13/2021 10:10 AM    HGB 11.0 (L) 12/13/2021 10:10 AM    HCT 34.4 12/13/2021 10:10 AM    PLATELET 334 02/79/2960 10:10 AM    MCV 85 12/13/2021 10:10 AM     Lab Results   Component Value Date/Time    Sodium 142 12/13/2021 10:10 AM    Potassium 3.6 12/13/2021 10:10 AM    Chloride 108 (H) 12/13/2021 10:10 AM    CO2 22 12/13/2021 10:10 AM    Anion gap 6 11/16/2021 09:38 AM    Glucose 95 12/13/2021 10:10 AM    BUN 6 12/13/2021 10:10 AM    Creatinine 0.78 12/13/2021 10:10 AM    BUN/Creatinine ratio 8 (L) 12/13/2021 10:10 AM    GFR est  12/13/2021 10:10 AM    GFR est non-AA 89 12/13/2021 10:10 AM    Calcium 9.2 12/13/2021 10:10 AM         IMPRESSION/PLAN:    Ms. Samantha Villalta is a 49 y.o. female with a working diagnosis of PMB with a history of breast cancer. Underwent a hysteroscopy/D&C with Dr. Charline Santiago, Gynecologic Oncology in [place] in 11/2020. Her pathology was consistent with \"focally disordered proliferative endometrium. Benign endocervical mucosa. \"     On 11/21/2021 underwent Pelvic exam under anesthesia, cervical dilation, hysteroscopy, MyoSure curetting/polypectomy, Mirena IUD placement. Final pathology consistent with benign proliferative endometrium.      Problems:     Patient Active Problem List    Diagnosis Date Noted    PMB (postmenopausal bleeding) 10/26/2021    Thalassemia     Hypertension     IBS (irritable bowel syndrome)     Esophageal spasm     Morbid obesity ([facility]     Diverticulosis     Diverticulitis     Fibromyalgia     Osteoarthritis     Breast cancer ([facility]        Reviewed patient's course to date, including her benign surgical pathology. IUD strings visualized at cervical os today. RTC in 6 months for continued surveillance. All questions and concerns were addressed with the patient and she is comfortable with the plan. An electronic signature was used to authenticate this note.      Rickey Bowles MD

## 2021-12-13 NOTE — PROGRESS NOTES
Subjective:     Erica Apple is a 49 y.o. female who presents for follow up of hypertension. Not controlled at visit last month but had missed several doses of medication. Diet and Lifestyle: generally follows a low sodium diet, sedentary  Home BP Monitoring: is borderline controlled at home, ranging 130-140's/80's    Cardiovascular ROS: taking medications as instructed, no medication side effects noted, no TIA's, no chest pain on exertion, no dyspnea on exertion, no swelling of ankles, no orthostatic dizziness or lightheadedness, no palpitations. Some lightheadedness and fatigue. New concerns:   Since last visit had hysteroscopy with D&C and IUD placement by Dr. Osvaldo Allen. Still with bleeding. Has f/u with him tomorrow. Pathology was negative. Started on Megace. New headache. Frontal, but spreads like band around top of head. Rhinorrhea is clear. Using NS and sinus rinse. Headache is continuous. Nothing makes it better or worse. Had labs through Dr. Akash Jordan through [facility] this am.  Included CBC. Saw [facility] for her back pain. Xrays completed - loss of disc space between L2-3 (mild), L3-4 (worse), L4-5 (even worse). Will start PT and if no improvement would get an MRI in the future. .      Current Outpatient Medications   Medication Sig Dispense Refill    megestroL (MEGACE) 20 mg tablet Take 1 Tablet by mouth daily for 30 days. 30 Tablet 0    polyethylene glycol (Miralax) 17 gram packet 1 packet mixed with 8 ounces of fluid      aspirin-acetaminophen-caffeine (Excedrin Extra Strength) 250-250-65 mg per tablet 2 tablets      wheat dextrin/calcium/aspartam (BENEFIBER + CALCIUM SUGAR-FREE PO) as directed.  dilTIAZem ER (DILACOR XR) 120 mg capsule Take 1 Capsule by mouth daily. 30 Capsule 5    syringe with needle 1 mL 22 gauge x 1 1/2\" syrg 1 Each by Does Not Apply route every seven (7) days.  For allergy injections (Patient taking differently: 1 Each by Does Not Apply route every seven (7) days. For allergy injections  LAST DOSE: 11/17/21) 15 Each 0    propylene glycoL (Systane Balance) 0.6 % drop Apply 1 Drop to eye two (2) times daily as needed.  biotin 10,000 mcg cap Take 1 Tablet by mouth daily.  cholecalciferol (VITAMIN D3) (5000 Units/125 mcg) tab tablet Take 5,000 Units by mouth daily.  clotrimazole-betamethasone (LOTRISONE) topical cream Apply  to affected area as needed.  cyanocobalamin 1,000 mcg tablet Take 100 mcg by mouth daily.  docusate sodium (COLACE) 100 mg capsule Take 100 mg by mouth two (2) times a day.  DULoxetine (CYMBALTA) 60 mg capsule duloxetine 60 mg capsule,delayed release   Take 1 capsule every day by oral route.  folic acid 441 mcg tablet Take 400 mcg by mouth daily.  loratadine (CLARITIN) 10 mg tablet Take 10 mg by mouth.  losartan-hydroCHLOROthiazide (HYZAAR) 100-12.5 mg per tablet losartan 100 mg-hydrochlorothiazide 12.5 mg tablet   Take 1 tablet every day by oral route.  montelukast (SINGULAIR) 10 mg tablet montelukast 10 mg tablet   TAKE 1 TABLET BY MOUTH EVERY DAY AT NIGHT      multivitamin capsule Take 1 Capsule by mouth daily.  azelastine-fluticasone (Dymista) 137-50 mcg/spray spry 1 Spray by Nasal route two (2) times a day. Lab Results   Component Value Date/Time    Glucose 106 (H) 11/16/2021 09:38 AM    Creatinine 0.73 11/16/2021 09:38 AM      Lab Results   Component Value Date/Time    ALT (SGPT) 23 11/16/2021 09:38 AM    Alk.  phosphatase 159 (H) 11/16/2021 09:38 AM    Bilirubin, total 0.5 11/16/2021 09:38 AM    Albumin 3.6 11/16/2021 09:38 AM    Protein, total 7.5 11/16/2021 09:38 AM    PLATELET 718 87/32/5237 09:38 AM     Lab Results   Component Value Date/Time    GFR est non-AA >60 11/16/2021 09:38 AM    GFR est AA >60 11/16/2021 09:38 AM    Creatinine 0.73 11/16/2021 09:38 AM    BUN 13 11/16/2021 09:38 AM    Sodium 139 11/16/2021 09:38 AM    Potassium 3.6 11/16/2021 09:38 AM    Chloride 104 11/16/2021 09:38 AM    CO2 29 11/16/2021 09:38 AM        Review of Systems, additional:  Pertinent items are noted in HPI. Objective:     Visit Vitals  BP (!) 148/82 (BP 1 Location: Left upper arm, BP Patient Position: Sitting, BP Cuff Size: Thigh)   Pulse 90   Temp 97.8 °F (36.6 °C) (Temporal)   Resp 14   Ht 5' 10\" (1.778 m)   Wt 333 lb 3.2 oz (151.1 kg)   LMP 11/19/2021   SpO2 99%   BMI 47.81 kg/m²     Appearance: alert, well appearing, and in no distress and oriented to person, place, and time. General exam:   Nares - edematous and congested. Sinuses nontender  OP - clear  NECK: supple, no lad, no bruit, no tm  LUNGS: cta bilat  CV rrr, no m/g/r  ABD: soft, nt, nd, nabs  EXT: no c/c/e  . Assessment/Plan:     hypertension borderline controlled. the following changes are made - increase Diltiazem to 180mg qd. Continue to wokr on diet and exercise for weight loss    Sinus congestion with sinus headache - add mucinex 1200mg bid    Chronic lbp - starting PT through [facility]    Postmenopausal bleeding - continued bleeding post d&C. Has f/u with Dr. Fredo Nance tomorrow. ? Anemia causing dizziness, had cbc checked today through Dr. Jesse Bone. Orders Placed This Encounter    dilTIAZem ER (CARDIZEM CD) 180 mg capsule     Follow-up and Dispositions    · Return in about 4 weeks (around 1/10/2022).

## 2021-12-13 NOTE — PROGRESS NOTES
Post op Visit, surgery was on 11/19/2021, pt reports she is still having vaginal spotting    1. Have you been to the ER, urgent care clinic since your last visit? Hospitalized since your last visit? Yes, surgery with Dr. Ghazal Silva on 11/19/2021    2. Have you seen or consulted any other health care providers outside of the [address] [place] [address] since your last visit? Include any pap smears or colon screening.    no

## 2021-12-13 NOTE — PROGRESS NOTES
Reviewed record in preparation for visit and have obtained necessary documentation. Identified pt with two pt identifiers(name and ). Chief Complaint   Patient presents with    Hypertension    Fatigue    Headache     Blood pressure (!) 148/82, pulse 90, temperature 97.8 °F (36.6 °C), temperature source Temporal, resp. rate 14, height 5' 10\" (1.778 m), weight 333 lb 3.2 oz (151.1 kg), last menstrual period 2021, SpO2 99 %. Health Maintenance Due   Topic Date Due    Hepatitis C Test  Never done    Cervical cancer screen  Never done    Mammogram  Never done    Cholesterol Test   Never done    Colorectal Screening  Never done    Yearly Flu Vaccine (1) 2021    Shingles Vaccine (1 of 2) Never done    COVID-19 Vaccine (3 - Booster for Moderna series) 10/16/2021       Ms. Alfonzo Whyte has a reminder for a \"due or due soon\" health maintenance. I have asked that she discuss this further with her primary care provider for follow-up on this health maintenance. Coordination of Care Questionnaire:  :     1) Have you been to an emergency room, urgent care clinic since your last visit? no   Hospitalized since your last visit? no             2) Have you seen or consulted any other health care providers outside of [address] [place] [address] since your last visit? no  (Include any pap smears or colon screenings in this section.)    3) In the event something were to happen to you and you were unable to speak on your behalf, do you have an Advance Directive/ Living Will in place stating your wishes?  NO

## 2021-12-13 NOTE — PATIENT INSTRUCTIONS
For headaches/sinus congestion:  Add Mucinex 1200mg twice a day    For blood pressure -   Increase Diltiazem to 180mg daily

## 2021-12-14 ENCOUNTER — OFFICE VISIT (OUTPATIENT)
Dept: GYNECOLOGY | Age: 51
End: 2021-12-14
Payer: COMMERCIAL

## 2021-12-14 ENCOUNTER — HOSPITAL ENCOUNTER (OUTPATIENT)
Dept: PHYSICAL THERAPY | Age: 51
Discharge: HOME | End: 2021-12-14
Payer: COMMERCIAL

## 2021-12-14 VITALS
WEIGHT: 293 LBS | BODY MASS INDEX: 41.95 KG/M2 | DIASTOLIC BLOOD PRESSURE: 105 MMHG | HEART RATE: 90 BPM | HEIGHT: 70 IN | SYSTOLIC BLOOD PRESSURE: 152 MMHG

## 2021-12-14 DIAGNOSIS — N95.0 PMB (POSTMENOPAUSAL BLEEDING): Primary | ICD-10-CM

## 2021-12-14 DIAGNOSIS — E66.01 MORBID OBESITY (HCC): ICD-10-CM

## 2021-12-14 LAB
25(OH)D3+25(OH)D2 SERPL-MCNC: 47.8 NG/ML (ref 30–100)
ALBUMIN SERPL-MCNC: 4.3 G/DL (ref 3.8–4.8)
ALBUMIN/GLOB SERPL: 1.3 {RATIO} (ref 1.2–2.2)
ALP SERPL-CCNC: 151 IU/L (ref 44–121)
ALT SERPL-CCNC: 14 IU/L (ref 0–32)
AST SERPL-CCNC: 17 IU/L (ref 0–40)
BASOPHILS # BLD AUTO: 0 X10E3/UL (ref 0–0.2)
BASOPHILS NFR BLD AUTO: 0 %
BILIRUB SERPL-MCNC: 0.4 MG/DL (ref 0–1.2)
BUN SERPL-MCNC: 6 MG/DL (ref 6–24)
BUN/CREAT SERPL: 8 (ref 9–23)
CALCIUM SERPL-MCNC: 9.2 MG/DL (ref 8.7–10.2)
CHLORIDE SERPL-SCNC: 108 MMOL/L (ref 96–106)
CHOLEST SERPL-MCNC: 177 MG/DL (ref 100–199)
CO2 SERPL-SCNC: 22 MMOL/L (ref 20–29)
CREAT SERPL-MCNC: 0.78 MG/DL (ref 0.57–1)
EOSINOPHIL # BLD AUTO: 0.1 X10E3/UL (ref 0–0.4)
EOSINOPHIL NFR BLD AUTO: 2 %
ERYTHROCYTE [DISTWIDTH] IN BLOOD BY AUTOMATED COUNT: 14.6 % (ref 11.7–15.4)
GLOBULIN SER CALC-MCNC: 3.2 G/DL (ref 1.5–4.5)
GLUCOSE SERPL-MCNC: 95 MG/DL (ref 65–99)
HCT VFR BLD AUTO: 34.4 % (ref 34–46.6)
HDLC SERPL-MCNC: 35 MG/DL
HGB BLD-MCNC: 11 G/DL (ref 11.1–15.9)
IMM GRANULOCYTES # BLD AUTO: 0 X10E3/UL (ref 0–0.1)
IMM GRANULOCYTES NFR BLD AUTO: 0 %
LDLC SERPL CALC-MCNC: 124 MG/DL (ref 0–99)
LYMPHOCYTES # BLD AUTO: 2.4 X10E3/UL (ref 0.7–3.1)
LYMPHOCYTES NFR BLD AUTO: 28 %
MCH RBC QN AUTO: 27.1 PG (ref 26.6–33)
MCHC RBC AUTO-ENTMCNC: 32 G/DL (ref 31.5–35.7)
MCV RBC AUTO: 85 FL (ref 79–97)
MONOCYTES # BLD AUTO: 0.5 X10E3/UL (ref 0.1–0.9)
MONOCYTES NFR BLD AUTO: 5 %
NEUTROPHILS # BLD AUTO: 5.6 X10E3/UL (ref 1.4–7)
NEUTROPHILS NFR BLD AUTO: 65 %
PLATELET # BLD AUTO: 328 X10E3/UL (ref 150–450)
POTASSIUM SERPL-SCNC: 3.6 MMOL/L (ref 3.5–5.2)
PROT SERPL-MCNC: 7.5 G/DL (ref 6–8.5)
RBC # BLD AUTO: 4.06 X10E6/UL (ref 3.77–5.28)
SODIUM SERPL-SCNC: 142 MMOL/L (ref 134–144)
TRIGL SERPL-MCNC: 97 MG/DL (ref 0–149)
VLDLC SERPL CALC-MCNC: 18 MG/DL (ref 5–40)
WBC # BLD AUTO: 8.7 X10E3/UL (ref 3.4–10.8)

## 2021-12-14 PROCEDURE — 97110 THERAPEUTIC EXERCISES: CPT

## 2021-12-14 PROCEDURE — 97140 MANUAL THERAPY 1/> REGIONS: CPT

## 2021-12-14 PROCEDURE — 99024 POSTOP FOLLOW-UP VISIT: CPT | Performed by: OBSTETRICS & GYNECOLOGY

## 2021-12-14 NOTE — PROGRESS NOTES
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 Any medication changes, allergies to medications, adverse drug reactions, diagnosis change, or new procedure performed?: [x] No    [] Yes (see summary sheet for update)  Subjective functional status/changes:   [x] No changes reported  Patient reports that she will start outpatient PT for her low back this Thursday. Patient reports that she is trying to wear her last set of compression arm garments, but the hand piece feels tight at times. Patient aware to not use product if it is uncomfortable has her girths have changed. Patient also continues to wear compression garments on her lower legs that she self purchased. OBJECTIVE  18 min Therapeutic Exercise:  [] Cordie Genin Exercises [] Remedial Lymphedema Exercise Program [x] Axillary Web Exercise Program      [x] Shoulder ROM Exercises    Rationale: Activate muscle pump to improve lymphatic fluid movement and decrease swelling to improve the patients ability to perform ADL and IADL skills and prevent worsening of swelling over time. 52 min Manual Therapy  Manual Lymphatic Drainage (MLD):  Area to decongest: UE: left and trunk   Sequence used and effectiveness: Modifications were made to manual lymph drainage sequence to exclude cervical techniques secondary to patient's age. Secondary sequence for upper extremity with trunk involvement.   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 Skin/wound care/debridement: Reviewed skin care principles:   Skin care products  Hygiene  Prevention of cellulitis   Applied multi-layer compression bandaging to: Deferred    Upper/Lower Extremity Compression: Measured for the following products and order has been placed:     UE: left  Trunk  LE: bilateral lower extremity:     Style: Flat-knit and Foam based    Brand: Medi Mondi L UE sleeve and glove/knee highs (CCL 2 )  Solaris Tribute for L UE and B LE below knee  Divshot Style compression bras [place] and Prima with extenders     Type: Custom: UE and LE garments Non-Custom: Size: 2X Large with extender     Vendor: [employer]    Education: Educated patient in compression garment donning and doffing. Glove use with donning. Daily wear schedule. Daily laundering. Garment lifespan. Return and reordering process (by bringing prior garments into the clinic). Educated patient to monitor for redness or pressure points on the skin. If new pain occurs they should contact their therapist.    Will educate on products when they arrive to be fitted. Patient is familiar with most products that she has used in the past.   Order was placed and vendor will contact patient with any questions regarding coverage. Rationale: decrease pain, increase ROM and decrease edema  to improve the patients ability to prevent worsening of swelling and axillary web syndrome over time. With   [x] TE   [] TA   [x] MT   [] SC   [] other: Patient Education: [x] Review HEP    [x] MLD Patient Education Continued education in self MLD technique with bathing and skin care. Educated patient in soft tissue mobilization for axillary web. Dycem provided. [x] Progressed/Changed HEP based on:   [x] positioning   [] Kinesiotape   [x] Skin care   [] wound care   [x] other: Axillary web stretching   [x]Patient/caregiver re-demonstrated bandaging.  [] Yes  [] No  Compression bandaging/garment precautions reviewed: [] Yes  [] No     Other Objective/Functional Measures:   Deferred today. Pain Level (0-10 scale) post treatment: No change per patient. Patient reports after she leaves she feels much better in regards to the axillary cord mobilizations. ASSESSMENT/Changes in Function:   Patient doing better with her range of motion of the L UE and mobility of the chest and lateral trunk/axilla on the left. Patient has met short term goals 1+4. patient's 3rd i Extended remaining short term goals and continue towards long term goals. Patient's frequency may be affected as she will start outpatient PT her low back later this week. Patient will return for follow up next week, but encouraged to contact insurance provider to make sure both will be covered. Patient using her Lymphapress daily without significant improvement. Will set up a Flexitouch Plus demonstration on an upcoming visit as it would be beneficial for daily use for patient is approved. Patient will continue to benefit from skilled PT services to  address ROM deficits, address swelling, analyze and address soft tissue restrictions, analyze compression product fit and use, instruct in home lymphedema management program and modify and progress therapeutic interventions to attain remaining goals. [x]  See Plan of Care  []  See progress note/recertification  []  See Discharge Summary        Progress towards goals / Updated goals:  GOALS  Short term goals  Time frame:  12/08/2021  1. Patient will demonstrate knowledge of signs/symptoms of infections/cellulitis and be independent in skin care to prevent cellulitis. Goal Met 12/7/2021  2. Patient will demonstrate independence in lymphedema home program of therapeutic exercises to improve circulation and decongest limb to improve ADLs. Progressing towards goal. Will extend to 2/28/2021  3. Lower extremity limb volumes will be assessed to monitor for changes over time.  Will extend goal to 2/28/2021. 4.  Patient will be measured for appropriate upper and lower extremity compression garments to prevent worsening of swelling over time. Goal Met 12/7/2021     Long term goals  Time frame: 2/28/2021  1. Pt will show improvement in the Lymphedema Life Impact Scale (LLIS) by decreasing the score to 12% and thus allow improvement in patient's quality of life. 2.  Patient will be independent with don/doff of compression system and use in order to prevent reaccumulation of fluid at discharge. 3.  Pt will be independent in self-MLD and show stable limb volumes showing decongestion and pt. ready for transition to independent restorative phase of lymphedema therapy.    PLAN  []  Upgrade activities as tolerated     [x]  Continue plan of care  []  Update interventions per flow sheet       []  Discharge due to:_  []  Other:_     Georges MCARTHUR Patch, PTA, CLT 12/14/2021

## 2021-12-14 NOTE — PROGRESS NOTES
HIPPA Verified. Called pt on mobile phone number listed. Patient was made aware of most recent blood work being good per provider. Patient was very appreciative over update and was made aware PCP will be notified with these results.   Sarahi Bolden

## 2021-12-15 ENCOUNTER — HOSPITAL ENCOUNTER (OUTPATIENT)
Dept: MAMMOGRAPHY | Age: 51
Discharge: HOME | End: 2021-12-15
Attending: INTERNAL MEDICINE

## 2021-12-15 DIAGNOSIS — Z85.3 HISTORY OF BREAST CANCER: ICD-10-CM

## 2021-12-15 DIAGNOSIS — Z98.890 HISTORY OF LUMPECTOMY OF LEFT BREAST: ICD-10-CM

## 2021-12-21 ENCOUNTER — APPOINTMENT (OUTPATIENT)
Dept: PHYSICAL THERAPY | Age: 51
End: 2021-12-21
Payer: COMMERCIAL

## 2021-12-21 DIAGNOSIS — N93.9 VAGINAL BLEEDING: ICD-10-CM

## 2021-12-21 RX ORDER — MEGESTROL ACETATE 20 MG/1
TABLET ORAL
Qty: 30 TABLET | Refills: 0 | Status: SHIPPED | OUTPATIENT
Start: 2021-12-21 | End: 2022-01-18

## 2021-12-23 ENCOUNTER — HOSPITAL ENCOUNTER (OUTPATIENT)
Dept: PHYSICAL THERAPY | Age: 51
Discharge: HOME | End: 2021-12-23
Payer: COMMERCIAL

## 2021-12-23 VITALS — BODY MASS INDEX: 41.95 KG/M2 | WEIGHT: 293 LBS | HEIGHT: 70 IN

## 2021-12-23 PROCEDURE — 97140 MANUAL THERAPY 1/> REGIONS: CPT

## 2021-12-23 NOTE — PROGRESS NOTES
LYMPHEDEMA PT DAILY TREATMENT NOTE - [facility] 2-15     Patient Name: Bhavna Hammonds  Date:2021  : 1970  [x]  Patient  Verified  Payor: Dominique Gomez / Plan: Danielle Nunes Se HMO / Product Type: HMO /    In time: 6270 Out time: 1115  Total Treatment Time (min):84  Total Timed Codes (min): 84  1:1 Treatment Time ( only): 84  Visit #:  5    Treatment Area: I89.0 Lymphedema    SUBJECTIVE  Pain Level (0-10 scale):  4/10 numeric scale left axilla and upper arm with reports of tightness and tenderness  Any medication changes, allergies to medications, adverse drug reactions, diagnosis change, or new procedure performed?: [x] No    [] Yes (see summary sheet for update)  Subjective functional status/changes:   [x] No changes reported   I am feeling a bit knapp today. I found out that I have unlimited garments with my insurance. I already received the bra but I forgot to bring it in today. I do feel like this glove is a little tight for me but I am trying to break it. OBJECTIVE  5 min Therapeutic Exercise:  [] Jodine Gallop Exercises [] Remedial Lymphedema Exercise Program [x] Axillary Web Exercise Program  with right sidelying pectoralis stretch x 10 repetitions, supine left shoulder PNF pattern D2 10 repetitions     [] Shoulder ROM Exercises    Rationale: Activate muscle pump to improve lymphatic fluid movement and decrease swelling to improve the patients ability to perform ADL and IADL skills and prevent worsening of swelling over time. 79 min Manual Therapy  Manual Lymphatic Drainage (MLD):  Area to decongest: UE: left and trunk   Sequence used and effectiveness: Modifications were made to manual lymph drainage sequence to exclude cervical techniques secondary to patient's age. Secondary sequence for upper extremity with trunk involvement. 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   Performed manual lymph drainage prior to soft tissue mobilizations with addition of deep intercostal and paravertebral techniques to address trunk swelling. Followed with soft tissue mobilizations to left upper arm, axilla and anterior chest wall with tractioning techniques using dicem. Also performed space correction techniques and twisty mobilization with active elbow flexion and extension to the left upper arm. Skin/wound care/debridement: Reviewed skin care principles:   Skin is 100% intact today   Applied multi-layer compression bandaging to: Deferred    Upper/Lower Extremity Compression: Measured for the following products during a previous session. Order has been placed and is in progress for the following items:     UE: left  Trunk  LE: bilateral lower extremity:     Style: Flat-knit and Foam based    Brand: Medi Mondi L UE sleeve and glove/knee highs (CCL 2 )  Solaris Tribute for L UE and B LE below knee  3Scan Style compression bras [place] and Prima with extenders     Type: Custom: UE and LE garments Non-Custom: Size: 2X Large with extender     Vendor: [facility]    Education: Return and reordering process (by bringing prior garments into the clinic). Patient reports she has received the bra but has not tried it yet. Instructed her to bring it in next session so we can assess fit and instructed her to bring in her products when delivered to assess fitting. Reviewed 30 day garment remake period with patient for custom products. Will educate in product use upon delivery. Patient is familiar with most products that she has used in the past.     Patient arrived wearing previous medi sleeve and glove. The patient reports the fingers of the glove feel tight. Assessed finger status with good color to the fingertips but they feel slightly cooler than the other hand. Patient does not report any tingling or numbness in the hand.   Recommended she launder the glove multiple times and that she places markers/highlghters in the tighter fingers to relax the materials as they dry and break them in. Discussed that her new compression garments should be arriving in the next 1-2 weeks so she will be able to wear the new garments which were measured for her recently. She independently donned her compression products at the completion of treatment today. Rationale: decrease pain, increase ROM and decrease edema  to improve the patients ability to prevent worsening of swelling and axillary web syndrome over time. With   [] TE   [] TA   [x] MT   [] SC   [] other: Patient Education: [] Review HEP    [x] MLD Patient Education Educated patient in soft tissue mobilization for axillary web. Dycem provided. Reviewed the basics of manual lymph drainage todayon previous visit  [x] Progressed/Changed HEP based on:   [x] positioning   [] Kinesiotape   [] Skin care   [] wound care   [x] other: Axillary web stretching   [x]Patient/caregiver re-demonstrated bandaging. [] Yes  [] No  Compression bandaging/garment precautions reviewed: [x] Yes  [] No     Other Objective/Functional Measures:   Lower extremity Volumes:  Reviewed results of volumetric measurements taken on evaluation. -right lower 13,009.93 ml today      -left lower  13,220.32 ml today  . Percent difference today is 1.62% left larger than right  . Left shoulder ROM   Flexion:  0-170 degrees   Internal rotation: 0-WFL   External rotation: 0-WFL   Abduction: 0-175 degrees     Pain Level (0-10 scale) post treatment:  6/10 numeric scale with soreness in right upper quadrant s/p axillary web soft tissue mobilizations. ASSESSMENT/Changes in Function: Compression garment order is in progress. Patient is instructed to bring in all products for a fitting upon delivery. Current glove appears to be tight in the fingers but it is not causing any adverse reactions currently. Patient will transition to new garments upon delivery.   Axillary web syndrome continues to improve with decreased discomfort reported the next day after treatment techniques are performed. Her left shoulder range of motion is currently functional. Will continue to progress her treatment program as able. Patient will continue to benefit from skilled PT services to  address ROM deficits, address swelling, analyze and address soft tissue restrictions, analyze compression product fit and use, instruct in home lymphedema management program and modify and progress therapeutic interventions to attain remaining goals. [x]  See Plan of Care  []  See progress note/recertification  []  See Discharge Summary        Progress towards goals / Updated goals:  GOALS  Short term goals  Time frame:  12/08/2021  1. Patient will demonstrate knowledge of signs/symptoms of infections/cellulitis and be independent in skin care to prevent cellulitis. Goal Met 12/7/2021  2. Patient will demonstrate independence in lymphedema home program of therapeutic exercises to improve circulation and decongest limb to improve ADLs. Progressing towards goal. Will extend to 2/28/2021  3. Lower extremity limb volumes will be assessed to monitor for changes over time. Goal met 12/23/2021 with baseline measurement assessment today. 4.  Patient will be measured for appropriate upper and lower extremity compression garments to prevent worsening of swelling over time. Goal Met 12/7/2021     Long term goals  Time frame: 2/28/2021  1. Pt will show improvement in the Lymphedema Life Impact Scale (LLIS) by decreasing the score to 12% and thus allow improvement in patient's quality of life. 2.  Patient will be independent with don/doff of compression system and use in order to prevent reaccumulation of fluid at discharge. 3.  Pt will be independent in self-MLD and show stable limb volumes showing decongestion and pt. ready for transition to independent restorative phase of lymphedema therapy.    PLAN  []  Upgrade activities as tolerated     [x] Continue plan of care  []  Update interventions per flow sheet       []  Discharge due to:_  [x]  Other:_ Fit for garments upon delivery, continue soft tissue mobilizations, reassess UE limb volume     Harry January, VISHNU, KELLEY-YAQUELIN 12/23/2021

## 2022-01-05 ENCOUNTER — TELEPHONE (OUTPATIENT)
Dept: GYNECOLOGY | Age: 52
End: 2022-01-05

## 2022-01-05 NOTE — TELEPHONE ENCOUNTER
Patient continues with vaginal bleeding daily. It isn't heavy but has to wear a pantyliner all day. She has been taking the Megace daily. She denies pain.

## 2022-01-10 ENCOUNTER — TRANSCRIBE ORDER (OUTPATIENT)
Dept: SCHEDULING | Age: 52
End: 2022-01-10

## 2022-01-10 DIAGNOSIS — Z85.3 HISTORY OF BREAST CANCER: Primary | ICD-10-CM

## 2022-01-10 DIAGNOSIS — Z98.890 HISTORY OF LUMPECTOMY OF LEFT BREAST: ICD-10-CM

## 2022-01-11 ENCOUNTER — VIRTUAL VISIT (OUTPATIENT)
Dept: GYNECOLOGY | Age: 52
End: 2022-01-11

## 2022-01-11 ENCOUNTER — HOSPITAL ENCOUNTER (OUTPATIENT)
Dept: PHYSICAL THERAPY | Age: 52
Discharge: HOME | End: 2022-01-11
Payer: COMMERCIAL

## 2022-01-11 DIAGNOSIS — N95.0 PMB (POSTMENOPAUSAL BLEEDING): Primary | ICD-10-CM

## 2022-01-11 DIAGNOSIS — Z17.1 MALIGNANT NEOPLASM OF LEFT BREAST IN FEMALE, ESTROGEN RECEPTOR NEGATIVE, UNSPECIFIED SITE OF BREAST (HCC): ICD-10-CM

## 2022-01-11 DIAGNOSIS — C50.912 MALIGNANT NEOPLASM OF LEFT BREAST IN FEMALE, ESTROGEN RECEPTOR NEGATIVE, UNSPECIFIED SITE OF BREAST (HCC): ICD-10-CM

## 2022-01-11 PROCEDURE — 97140 MANUAL THERAPY 1/> REGIONS: CPT

## 2022-01-11 PROCEDURE — 99213 OFFICE O/P EST LOW 20 MIN: CPT | Performed by: OBSTETRICS & GYNECOLOGY

## 2022-01-11 NOTE — PROGRESS NOTES
[address] [place] [address] [address]ias [address], [address] [facility]  P [phone]  F [phone]    Office Note  Patient ID:  Name:  Jamie Frias  MRN:  [id number]  :  1970/51 y.o. Date:  2022      HISTORY OF PRESENT ILLNESS:  Ms. Jamie Frias is a 49 y.o. female with persistent PMB with a history of breast cancer. Underwent a hysteroscopy/D&C with Dr. Joel Luz, Gynecologic Oncology in [place] in 2020. Her pathology was consistent with \"focally disordered proliferative endometrium. Benign endocervical mucosa. \"     On 2021 underwent Pelvic exam under anesthesia, cervical dilation, hysteroscopy, MyoSure curetting/polypectomy, Mirena IUD placement. Final pathology consistent with benign proliferative endometrium. Presents today for problem visit. She was started on Megace once daily to help stop some of her breakthrough bleeding. She still reports that she is having spotting. She is otherwise without complaints. Initial History:  Ms. Jamie Frias is a 49 y.o.  postmenopausal female who presents as a new patient from Dr. Nika Snowden for vaginal bleeding/spotting. The patient has a history of triple negative breast cancer diagnosed in 2016 s/p lumpectomy/AC-T chemo and radiation. The patient reports menopause a few years ago while undergoing treatment for her breast cancer. She presented last year with vaginal spotting/bleeding and underwent a hysteroscopy/D&C with Dr. Joel Luz, Gynecologic Oncology in [place]. Her pathology was consistent with \"focally disordered proliferative endometrium. Benign endocervical mucosa. \"     She is now followed by Dr. Nika Snowden for her breast cancer. She is referred to our office for continued vaginal spotting/bleeding. Interval History:  Presents back today for ultrasound results. Pertinent PMH/PSH: breast cancer, HTN, obesity, IBS      Active, no restrictions.      Imaging Review:  Pelvic ultrasound 11/3/2021:  FINDINGS:    TRANSABDOMINAL:  The uterus measures 10.2 x 5.3 x 8.3 cm. The endometrial stripe is thickened  measuring 10 mm. The uterus otherwise appears normal.  The bilateral ovaries are not well seen due to overlying bowel gas. TRANSVAGINAL:  The uterus measures 8.7 x 5.5 x 6.9 cm. The endometrial stripe is thickened  measuring 12 mm. The uterus otherwise appears normal and anteverted. The bilateral ovaries are not well seen due to overlying bowel gas. No free fluid in the pelvis. IMPRESSION  1. Thickened endometrial stripe in a postmenopausal patient, with differential  considerations of endometrial hyperplasia, carcinoma, and polyp. Recommend  endometrial biopsy. Pathology Review:   11/2/2020:  A. ECC: benign endocervical mucosa with chronic inflammation  B. Endometrium: focally disordered proliferative endometrium. Benign endocervical mucosa. ROS:  A comprehensive review of systems was negative except for that written in the History of Present Illness.  , 10 point ROS      ECOG stGstrstastdstest:st st1st Problem List:  Patient Active Problem List    Diagnosis Date Noted    PMB (postmenopausal bleeding) 10/26/2021    Thalassemia     Hypertension     IBS (irritable bowel syndrome)     Esophageal spasm     Morbid obesity ([address]     Diverticulosis     Diverticulitis     Fibromyalgia     Osteoarthritis     Breast cancer ([address]      PMH:  Past Medical History:   Diagnosis Date    Asthma     AS A CHILD    Breast cancer ([address] 07/21/2016    LEFT    Diverticulitis     Diverticulosis     Esophageal spasm     Fibromyalgia     GERD (gastroesophageal reflux disease)     Heart murmur     History of chemotherapy 01/11/1977    Completed    Hypertension     IBS (irritable bowel syndrome)     Lymphedema     LEFT ARM    Morbid obesity (HCC)     Nausea & vomiting     Osteoarthritis     PUD (peptic ulcer disease)     YEARS AGO    S/P radiation therapy 04/12/2017 Completed    Thalassemia       PSH:  Past Surgical History:   Procedure Laterality Date    HX BARIATRIC SURGERY  10/2010    lap band    HX BREAST LUMPECTOMY Left 07/2016    HX COLONOSCOPY  2017    HX DILATION AND CURETTAGE  11/2020    HX GASTRIC BYPASS  10/2010    LAP BAND    HX ORTHOPAEDIC  10/2014    right foot and ankle reconstruction for flat feet    HX ORTHOPAEDIC Bilateral 2008    knee arthroscopy    HX ORTHOPAEDIC Left 2006    HEEL surgery    HX TONSILLECTOMY        Social History:  Social History     Tobacco Use    Smoking status: Never Smoker    Smokeless tobacco: Never Used   Substance Use Topics    Alcohol use: Yes     Comment: 1 time a year      Family History:  Family History   Problem Relation Age of Onset    OSTEOARTHRITIS Mother     Thalassemia Mother     Hypertension Mother     Heart Disease Father         chf    Cancer Father         lung    Hypertension Father     Arthritis Father     Arthritis-rheumatoid Brother     Hypertension Brother     Hypertension Brother     Elevated Lipids Brother     OSTEOARTHRITIS Brother     No Known Problems Sister     Anesth Problems Neg Hx       Medications: (reviewed)  Current Outpatient Medications   Medication Sig    dilTIAZem ER (CARDIZEM CD) 180 mg capsule Take 1 Capsule by mouth daily.  polyethylene glycol (Miralax) 17 gram packet 1 packet mixed with 8 ounces of fluid    aspirin-acetaminophen-caffeine (Excedrin Extra Strength) 250-250-65 mg per tablet 2 tablets    wheat dextrin/calcium/aspartam (BENEFIBER + CALCIUM SUGAR-FREE PO) as directed.  propylene glycoL (Systane Balance) 0.6 % drop Apply 1 Drop to eye two (2) times daily as needed.  biotin 10,000 mcg cap Take 1 Tablet by mouth daily.  cholecalciferol (VITAMIN D3) (5000 Units/125 mcg) tab tablet Take 5,000 Units by mouth daily.  clotrimazole-betamethasone (LOTRISONE) topical cream Apply  to affected area as needed.     cyanocobalamin 1,000 mcg tablet Take 100 mcg by mouth daily.  docusate sodium (COLACE) 100 mg capsule Take 100 mg by mouth two (2) times a day.  DULoxetine (CYMBALTA) 60 mg capsule duloxetine 60 mg capsule,delayed release   Take 1 capsule every day by oral route.  folic acid 710 mcg tablet Take 400 mcg by mouth daily.  loratadine (CLARITIN) 10 mg tablet Take 10 mg by mouth.  losartan-hydroCHLOROthiazide (HYZAAR) 100-12.5 mg per tablet losartan 100 mg-hydrochlorothiazide 12.5 mg tablet   Take 1 tablet every day by oral route.  montelukast (SINGULAIR) 10 mg tablet montelukast 10 mg tablet   TAKE 1 TABLET BY MOUTH EVERY DAY AT NIGHT    multivitamin capsule Take 1 Capsule by mouth daily.  megestroL (MEGACE) 20 mg tablet TAKE 1 TABLET BY MOUTH EVERY DAY    syringe with needle 1 mL 22 gauge x 1 1/2\" syrg 1 Each by Does Not Apply route every seven (7) days. For allergy injections (Patient taking differently: 1 Each by Does Not Apply route every seven (7) days. For allergy injections  LAST DOSE: 11/17/21)    azelastine-fluticasone (Dymista) 137-50 mcg/spray spry 1 Spray by Nasal route two (2) times a day. No current facility-administered medications for this visit. Allergies: (reviewed)  Allergies   Allergen Reactions    Other Food Sneezing    Adhesive Other (comments)     Tears skin off. Severe.  Oxycodone Itching    Lisinopril Other (comments)     \"constant tickle in my throat\"  \"constant tickle in my throat\"      Mold Other (comments)    Nickel Other (comments)    Sulfa (Sulfonamide Antibiotics) Other (comments)    Sulfamethoxazole-Trimethoprim Hives    Anesthetics - Amide Type - Select Amino Amides Nausea Only        OBJECTIVE:  *deferred today given video-conference visit for ongoing COVID-19 pandemic*   Physical Exam:  VITAL SIGNS: There were no vitals filed for this visit. There is no height or weight on file to calculate BMI. GENERAL GEOVANNI: Conversant, alert, oriented. No acute distress. HEENT: HEENT.  No thyroid enlargement. No JVD. Neck: Supple without restrictions. RESPIRATORY: Clear to auscultation and percussion to the bases. No CVAT. CARDIOVASC: RRR without murmur/rub. GASTROINT: soft, non-tender, without masses or organomegaly, obese   MUSCULOSKEL: no joint tenderness, deformity or swelling       EXTREMITIES: extremities normal, atraumatic, no cyanosis or edema   PELVIC: Exam chaperoned by nurse. Normal appearing external genitalia. On speculum exam, normal appearing vagina and cervix. IUD strings visualized at cervical os. Deferred bimanual exam: On bimanual exam, the cervix and uterus are normal size and mobile. No evidence of adnexal masses or nodularity. RECTAL: deferred   LLOYD SURVEY: No suspicious lymphadenopathy or edema noted. NEURO: Grossly intact. No acute deficit. Lab Date as available:    Lab Results   Component Value Date/Time    WBC 8.7 12/13/2021 10:10 AM    HGB 11.0 (L) 12/13/2021 10:10 AM    HCT 34.4 12/13/2021 10:10 AM    PLATELET 270 01/94/1891 10:10 AM    MCV 85 12/13/2021 10:10 AM     Lab Results   Component Value Date/Time    Sodium 142 12/13/2021 10:10 AM    Potassium 3.6 12/13/2021 10:10 AM    Chloride 108 (H) 12/13/2021 10:10 AM    CO2 22 12/13/2021 10:10 AM    Anion gap 6 11/16/2021 09:38 AM    Glucose 95 12/13/2021 10:10 AM    BUN 6 12/13/2021 10:10 AM    Creatinine 0.78 12/13/2021 10:10 AM    BUN/Creatinine ratio 8 (L) 12/13/2021 10:10 AM    GFR est  12/13/2021 10:10 AM    GFR est non-AA 89 12/13/2021 10:10 AM    Calcium 9.2 12/13/2021 10:10 AM         IMPRESSION/PLAN:    Ms. Andi Urbina is a 49 y.o. female with a working diagnosis of PMB with a history of breast cancer. Underwent a hysteroscopy/D&C with Dr. Komal Gurrola, Gynecologic Oncology in [place] in 11/2020. Her pathology was consistent with \"focally disordered proliferative endometrium. Benign endocervical mucosa. \"     On 11/21/2021 underwent Pelvic exam under anesthesia, cervical dilation, hysteroscopy, MyoSure curetting/polypectomy, Mirena IUD placement. Final pathology consistent with benign proliferative endometrium. Problems:     Patient Active Problem List    Diagnosis Date Noted    PMB (postmenopausal bleeding) 10/26/2021    Thalassemia     Hypertension     IBS (irritable bowel syndrome)     Esophageal spasm     Morbid obesity ([facility]     Diverticulosis     Diverticulitis     Fibromyalgia     Osteoarthritis     Breast cancer ([facility]        Reviewed patient's course to date. Counseled patient that sometimes breakthrough bleeding can occur with an IUD. Reviewed her benign surgical findings. Discussed that if the bleeding worsens over time then we may have to consider a definitive hysterectomy. However, it is normal at this point to have continued spotting. Will continue to monitor for n ow. Continue low-dose megace to help with the breakthrough bleeding. RTC in 6 months for continued surveillance. All questions and concerns were addressed with the patient and she is comfortable with the plan. An electronic signature was used to authenticate this note. Tristian Dunn MD     Pursuant to the emergency declaration unde the [address] [place] [address] [facility] authority and the [employer] and [employer] Act, this Virtual Visit was conducted, with patient's consent, to reduce the patient's risk of exposure to COVID-19 and provide continuity of care for an established patient. Patient identification was verified at the start of the visit: Yes    Services were provided through a video synchronous discussion virtually to substitute for in-person clinic visit. Patient was at her individual home, while the provider was in his/her respective office.     I spent at least 25 minutes with this established patient, and >50% of the time was spent counseling and/or coordinating care regarding her ongoing sharona Cross MD

## 2022-01-11 NOTE — PROGRESS NOTES
LYMPHEDEMA PT DAILY TREATMENT NOTE - [facility] 2-15   30 Day Reassessment   Patient Name: Jsoe Luis Cuellar  Date:2022  : 1970  [x]  Patient  Verified  Payor: Don Tobar / Plan: [address] HMO / Product Type: HMO /    In time: 12:50pm Out time: 2:00pm  Total Treatment Time (min):70  Total Timed Codes (min): 70  1:1 Treatment Time ( [address] only): 70  Visit #:  6    Treatment Area: I89.0 Lymphedema    SUBJECTIVE  Pain Level (0-10 scale):Continues with low back soreness. Axillary web improved. Pain not rated. Any medication changes, allergies to medications, adverse drug reactions, diagnosis change, or new procedure performed?: [x] No    [] Yes (see summary sheet for update)  Subjective functional status/changes:   [x] No changes reported  Patient reports that she is happy with her new custom L UE garments and [employer] bra. \"These are very comforable. \" Patient eager to get the remainder of her products. Continues to go to her outpatient PT for her low back issues. OBJECTIVE  70 min Manual Therapy  Manual Lymphatic Drainage (MLD):  Area to decongest: UE: left and trunk   Sequence used and effectiveness: Modifications were made to manual lymph drainage sequence to exclude cervical techniques secondary to patient's age. Secondary sequence for upper extremity with trunk involvement. Skin/wound care/debridement: Reviewed skin care principles:   Skin is 100% intact today   Applied multi-layer compression bandaging to: Deferred    Upper/Lower Extremity Compression: Fitted for the following products:    UE: left  Trunk  Style: Flat-knit and compression bra/vest and foam    Brand: Medi Mondi L UE sleeve and glove(CCL 2 )    [employer] Style compression bras Kristyn    Type: Custom: UE and LE garments Non-Custom: Size: 2X Large with extender     The following items have not arrived as the vendor needed to wait until the next calendar year to process the remaining order.      LE: bilateral lower extremity: Style: Flat-knit and Foam based    Brand: Medi knee highs (CCL 2 )  [employer] Tribute for L UE and B LEs below knee  [employer] Style compression bra Prima with extender    Type: Custom: UE and LE garments Non-Custom: Size: 2X Large with extender     Vendor: [employer]    Education: Educated patient in compression garment donning and doffing. Glove use with donning. Daily wear schedule. Daily laundering. Garment lifespan. Return and reordering process (by bringing prior garments into the clinic). Educated patient to monitor for redness or pressure points on the skin. If new pain occurs they should contact their therapist.     Patient is independent with donning and doffing  her compression products. Rationale: decrease pain, increase ROM and decrease edema  to improve the patients ability to prevent worsening of swelling and axillary web syndrome over time. With   [] TE   [] TA   [x] MT   [] SC   [] other: Patient Education: [] Review HEP    [x] MLD Patient Education Educated patient in soft tissue mobilization for axillary web. Dycem provided. [x] Progressed/Changed HEP based on:   [x] positioning   [] Kinesiotape   [x] Skin care   [] wound care   [x] other: Axillary web stretching   [x]Patient/caregiver re-demonstrated bandaging. [] Yes  [] No  Compression bandaging/garment precautions reviewed: [x] Yes  [] No     Other Objective/Functional Measures:   Reviewed results of volumetric measurements taken on evaluation. -left upper 6,328.81 ml today compared to 6,594.72 ml on 11/09/2021.     -right upper  5,913.10 ml today compared to 6,083.63 ml on 11/09/2021. Percent difference today is 7.03% as compared to 8.40% on evaluation.    Truncal Girths:  Left (cm)     axilla  125.0     Nipple line  136.5   xiphoid  127.5   waist  131.5        1/11/2022 (cm)  12/7/2021 (cm) 11/24/2021 (cm) 11/9/2021 (cm)   axilla   123    119     122 125   Chest/nipple line   137    136     134 136.5 xiphoid      119 118     118 127.5    waist   136          131.5   hips  158                 Weight: 336.0 lbs   Pain Level (0-10 scale) post treatment: Continues with soreness in low back. No number given. ASSESSMENT/Changes in Function:   Patient returned today wearing her new custom garments for the L UE and also her compression bra/vest. Patient comfortable in her items and eager to get second set of L UE garments and to receive her remaining products that were ordered in December. Patient continues with functional shoulder range of motion and improving axillary web symptoms. She continues to go to her outpatient PT for her low back and once she has all of her compression items in place should be ready to transition to the restorative phase of care. Patient uses her basic vasopneumatic device and is not seeing the results and would like to be set up with a demonstration/treatment of the Flexitouch Plus device. Will work on this for her next visit. Patient was not sure of her schedule so she will contact the clinic to make an appointment for follow up in 2 weeks. Patient will continue to benefit from skilled PT services to  address ROM deficits, address swelling, analyze and address soft tissue restrictions, analyze compression product fit and use, instruct in home lymphedema management program and modify and progress therapeutic interventions to attain remaining goals. [x]  See Plan of Care  []  See progress note/recertification  []  See Discharge Summary        Progress towards goals / Updated goals:  GOALS  Short term goals  Time frame:  12/08/2021  1. Patient will demonstrate knowledge of signs/symptoms of infections/cellulitis and be independent in skin care to prevent cellulitis. Goal Met 12/7/2021  2. Patient will demonstrate independence in lymphedema home program of therapeutic exercises to improve circulation and decongest limb to improve ADLs.  Progressing towards goal. Will extend to 2/28/2021  3. Lower extremity limb volumes will be assessed to monitor for changes over time. Goal met 12/23/2021 with baseline measurement assessment today. 4.  Patient will be measured for appropriate upper and lower extremity compression garments to prevent worsening of swelling over time. Goal Met 12/7/2021     Long term goals  Time frame: 2/28/2021  1. Pt will show improvement in the Lymphedema Life Impact Scale (LLIS) by decreasing the score to 12% and thus allow improvement in patient's quality of life. Progressing towards goal.   2.  Patient will be independent with don/doff of compression system and use in order to prevent reaccumulation of fluid at discharge. Progressing towards goal. Independent with current products. 3.  Pt will be independent in self-MLD and show stable limb volumes showing decongestion and pt. ready for transition to independent restorative phase of lymphedema therapy. Progressing towards goals. PLAN  []  Upgrade activities as tolerated     [x]  Continue plan of care  []  Update interventions per flow sheet       []  Discharge due to:_  [x]  Other: will contact vendor regarding remaining compression products.     Georges Flowers, PTA, CLT  RAZ TamezT, CLT-YAQUELIN   1/11/2022

## 2022-01-11 NOTE — PROGRESS NOTES
Virtual problem visit, \"I'm getting concerned because I have not had one day without any bleeding or spotting since my procedure on 11/19/221. Typically with the spotting, I would use two pantyliners a day. I started having some cramping that has continued through today. I am still taking the medication daily\"    1. Have you been to the ER, urgent care clinic since your last visit? Hospitalized since your last visit?  no    2. Have you seen or consulted any other health care providers outside of the [address] [place] [address] since your last visit? Include any pap smears or colon screening.      no

## 2022-01-13 ENCOUNTER — HOSPITAL ENCOUNTER (OUTPATIENT)
Dept: MAMMOGRAPHY | Age: 52
Discharge: HOME | End: 2022-01-13
Attending: INTERNAL MEDICINE
Payer: COMMERCIAL

## 2022-01-13 DIAGNOSIS — Z85.3 HISTORY OF BREAST CANCER: ICD-10-CM

## 2022-01-13 DIAGNOSIS — Z98.890 HISTORY OF LUMPECTOMY OF LEFT BREAST: ICD-10-CM

## 2022-01-13 PROCEDURE — 77062 BREAST TOMOSYNTHESIS BI: CPT

## 2022-01-14 DIAGNOSIS — N93.9 VAGINAL BLEEDING: ICD-10-CM

## 2022-01-18 RX ORDER — MEGESTROL ACETATE 20 MG/1
TABLET ORAL
Qty: 30 TABLET | Refills: 0 | Status: SHIPPED | OUTPATIENT
Start: 2022-01-18 | End: 2022-01-20 | Stop reason: SDUPTHER

## 2022-01-20 DIAGNOSIS — N95.0 PMB (POSTMENOPAUSAL BLEEDING): Primary | ICD-10-CM

## 2022-01-20 DIAGNOSIS — N93.9 VAGINAL BLEEDING: ICD-10-CM

## 2022-01-20 RX ORDER — MEGESTROL ACETATE 20 MG/1
20 TABLET ORAL 2 TIMES DAILY
Qty: 60 TABLET | Refills: 0 | Status: ON HOLD | OUTPATIENT
Start: 2022-01-20 | End: 2022-02-10 | Stop reason: ALTCHOICE

## 2022-01-20 NOTE — TELEPHONE ENCOUNTER
Pt needs refill, dr. Jevon Littlejohn is increasing her to twice a day  Requested Prescriptions     Signed Prescriptions Disp Refills    megestroL (MEGACE) 20 mg tablet 60 Tablet 0     Sig: Take 1 Tablet by mouth two (2) times a day for 30 days.      Authorizing Provider: Juliane Aschoff     Ordering User: Juvenal Dallas

## 2022-01-31 NOTE — PROGRESS NOTES
3 week follow up for PMB, pt would like to discuss a hysterectomy, pt would also like to review her blood work from 1/20/2022 ,  pt reports bleeding has improved since increasing megace to two times per day    Vitals:    02/01/22 1023 02/01/22 1108   BP: (!) 174/106 (!) 176/103   BP 1 Location: Left arm Right arm   BP Patient Position: Sitting Sitting   Pulse: 96 (!) 102   Height: 5' 10\" (1.778 m)    Weight: 338 lb (153.3 kg)          1. Have you been to the ER, urgent care clinic since your last visit? Hospitalized since your last visit?  no    2. Have you seen or consulted any other health care providers outside of the [address] [place] [address] since your last visit? Include any pap smears or colon screening.    no

## 2022-02-01 ENCOUNTER — TRANSCRIBE ORDER (OUTPATIENT)
Dept: REGISTRATION | Age: 52
End: 2022-02-01

## 2022-02-01 ENCOUNTER — OFFICE VISIT (OUTPATIENT)
Dept: GYNECOLOGY | Age: 52
End: 2022-02-01
Payer: COMMERCIAL

## 2022-02-01 VITALS
SYSTOLIC BLOOD PRESSURE: 176 MMHG | HEIGHT: 70 IN | HEART RATE: 102 BPM | DIASTOLIC BLOOD PRESSURE: 103 MMHG | WEIGHT: 293 LBS | BODY MASS INDEX: 41.95 KG/M2

## 2022-02-01 DIAGNOSIS — Z01.812 PRE-PROCEDURE LAB EXAM: Primary | ICD-10-CM

## 2022-02-01 DIAGNOSIS — Z17.1 MALIGNANT NEOPLASM OF LEFT BREAST IN FEMALE, ESTROGEN RECEPTOR NEGATIVE, UNSPECIFIED SITE OF BREAST (HCC): ICD-10-CM

## 2022-02-01 DIAGNOSIS — N95.0 PMB (POSTMENOPAUSAL BLEEDING): Primary | ICD-10-CM

## 2022-02-01 DIAGNOSIS — C50.912 MALIGNANT NEOPLASM OF LEFT BREAST IN FEMALE, ESTROGEN RECEPTOR NEGATIVE, UNSPECIFIED SITE OF BREAST (HCC): ICD-10-CM

## 2022-02-01 DIAGNOSIS — E66.01 MORBID OBESITY (HCC): ICD-10-CM

## 2022-02-01 PROCEDURE — 99214 OFFICE O/P EST MOD 30 MIN: CPT | Performed by: OBSTETRICS & GYNECOLOGY

## 2022-02-01 NOTE — H&P (VIEW-ONLY)
[address] [facility] [address] [facility] [phone]  F [phone]    Office Note  Patient ID:  Name:  Rolando Perez  MRN:  [id number]  :  1970/51 y.o. Date:  2022      HISTORY OF PRESENT ILLNESS:  Ms. Rolando Perez is a 49 y.o. female with persistent PMB with a history of breast cancer. Underwent a hysteroscopy/D&C with Dr. Tiffany Chapman, Gynecologic Oncology in [place] in 2020. Her pathology was consistent with \"focally disordered proliferative endometrium. Benign endocervical mucosa. \"     On 2021 underwent Pelvic exam under anesthesia, cervical dilation, hysteroscopy, MyoSure curetting/polypectomy, Mirena IUD placement. Final pathology consistent with benign proliferative endometrium. Presents today for problem visit. She was started on Megace twice daily to help stop some of her breakthrough bleeding. She still reports that she has a heavy flow every so often and she is ready for a hysterectomy. She is tired of dealing with the abnormal bleeding and she has had multiple D&C's over the last few years. Initial History:  Ms. Rolando Perez is a 49 y.o.  postmenopausal female who presents as a new patient from Dr. Yuriy Walsh for vaginal bleeding/spotting. The patient has a history of triple negative breast cancer diagnosed in 2016 s/p lumpectomy/AC-T chemo and radiation. The patient reports menopause a few years ago while undergoing treatment for her breast cancer. She presented last year with vaginal spotting/bleeding and underwent a hysteroscopy/D&C with Dr. Tiffany Chapman, Gynecologic Oncology in [place]. Her pathology was consistent with \"focally disordered proliferative endometrium. Benign endocervical mucosa. \"     She is now followed by Dr. Yuriy Walsh for her breast cancer. She is referred to our office for continued vaginal spotting/bleeding.      Interval History:  Presents back today for ultrasound results. Pertinent PMH/PSH: breast cancer, HTN, obesity, IBS      Active, no restrictions. Imaging Review:  Pelvic ultrasound 11/3/2021:  FINDINGS:    TRANSABDOMINAL:  The uterus measures 10.2 x 5.3 x 8.3 cm. The endometrial stripe is thickened  measuring 10 mm. The uterus otherwise appears normal.  The bilateral ovaries are not well seen due to overlying bowel gas. TRANSVAGINAL:  The uterus measures 8.7 x 5.5 x 6.9 cm. The endometrial stripe is thickened  measuring 12 mm. The uterus otherwise appears normal and anteverted. The bilateral ovaries are not well seen due to overlying bowel gas. No free fluid in the pelvis. IMPRESSION  1. Thickened endometrial stripe in a postmenopausal patient, with differential  considerations of endometrial hyperplasia, carcinoma, and polyp. Recommend  endometrial biopsy. Pathology Review:   11/2/2020:  A. ECC: benign endocervical mucosa with chronic inflammation  B. Endometrium: focally disordered proliferative endometrium. Benign endocervical mucosa. ROS:  A comprehensive review of systems was negative except for that written in the History of Present Illness.  , 10 point ROS      ECOG rdGrdrrdarddrderd:rd rd3rd Problem List:  Patient Active Problem List    Diagnosis Date Noted    PMB (postmenopausal bleeding) 10/26/2021    Thalassemia     Hypertension     IBS (irritable bowel syndrome)     Esophageal spasm     Morbid obesity ([address]     Diverticulosis     Diverticulitis     Fibromyalgia     Osteoarthritis     Breast cancer ([address]      PMH:  Past Medical History:   Diagnosis Date    Arrhythmia     MURMUR    Asthma     AS A CHILD    Breast cancer ([address] 07/21/2016    LEFT    Diverticulitis     Diverticulosis     Esophageal spasm     Fibromyalgia     GERD (gastroesophageal reflux disease)     Heart murmur     History of chemotherapy 01/11/1977    Completed    Hypertension     IBS (irritable bowel syndrome)     Ill-defined condition     LYMPHEDEMA LEFT ARM    Lymphedema     LEFT ARM    Morbid obesity ([facility]     Nausea & vomiting     Osteoarthritis     PUD (peptic ulcer disease)     YEARS AGO    S/P radiation therapy 04/12/2017    Completed    Thalassemia       PSH:  Past Surgical History:   Procedure Laterality Date    HX BARIATRIC SURGERY  10/2010    lap band    HX BREAST LUMPECTOMY Left 07/2016    CHEMO AND RADIATION    HX COLONOSCOPY  2017    HX DILATION AND CURETTAGE  11/2020, 2021    HX GASTRIC BYPASS  10/2010    LAP BAND    HX KNEE ARTHROSCOPY Bilateral 2008    HX ORTHOPAEDIC  10/2014    right foot and ankle reconstruction for flat feet    HX ORTHOPAEDIC Left 2006    ACHILLES TENDON REPAIR    HX TONSILLECTOMY        Social History:  Social History     Tobacco Use    Smoking status: Never Smoker    Smokeless tobacco: Never Used   Substance Use Topics    Alcohol use: Not Currently      Family History:  Family History   Problem Relation Age of Onset    OSTEOARTHRITIS Mother     Thalassemia Mother     Hypertension Mother     Heart Disease Father         chf    Cancer Father         lung    Hypertension Father     Arthritis Father     Heart Failure Father    Theodoro Milder Arthritis-rheumatoid Brother     Hypertension Brother     Hypertension Brother     Elevated Lipids Brother     OSTEOARTHRITIS Brother     OSTEOARTHRITIS Sister     Other Brother         SARCOIDOSIS    Anesth Problems Neg Hx       Medications: (reviewed)  Current Outpatient Medications   Medication Sig    megestroL (MEGACE) 20 mg tablet Take 1 Tablet by mouth two (2) times a day for 30 days. (Patient taking differently: Take 40 mg by mouth daily.)    dilTIAZem ER (CARDIZEM CD) 180 mg capsule Take 1 Capsule by mouth daily.  polyethylene glycol (Miralax) 17 gram packet as needed.  aspirin-acetaminophen-caffeine (Excedrin Extra Strength) 250-250-65 mg per tablet 2 Tablets two (2) times a day.  wheat dextrin/calcium/aspartam (BENEFIBER + CALCIUM SUGAR-FREE PO) daily.     propylene glycoL (Systane Balance) 0.6 % drop Apply 1 Drop to eye two (2) times daily as needed.  biotin 10,000 mcg cap Take 1 Tablet by mouth daily.  cholecalciferol (VITAMIN D3) (5000 Units/125 mcg) tab tablet Take 5,000 Units by mouth daily.  clotrimazole-betamethasone (LOTRISONE) topical cream Apply  to affected area as needed.  cyanocobalamin 1,000 mcg tablet Take 100 mcg by mouth daily.  DULoxetine (CYMBALTA) 60 mg capsule duloxetine 60 mg capsule,delayed release   Take 1 capsule every day by oral route.  folic acid 331 mcg tablet Take 400 mcg by mouth daily.  loratadine (CLARITIN) 10 mg tablet Take 10 mg by mouth daily.  losartan-hydroCHLOROthiazide (HYZAAR) 100-12.5 mg per tablet losartan 100 mg-hydrochlorothiazide 12.5 mg tablet   Take 1 tablet every day by oral route.  multivitamin capsule Take 1 Capsule by mouth daily.  docusate sodium (Colace) 100 mg capsule Take 100 mg by mouth daily. No current facility-administered medications for this visit. Allergies: (reviewed)  Allergies   Allergen Reactions    Other Food Sneezing    Adhesive Other (comments)     Tears skin off. Severe.  Oxycodone Itching    Lisinopril Other (comments)     \"constant tickle in my throat\"  \"constant tickle in my throat\"      Mold Other (comments)    Nickel Other (comments)    Sulfa (Sulfonamide Antibiotics) Other (comments)    Sulfamethoxazole-Trimethoprim Hives    Anesthetics - Amide Type - Select Amino Amides Nausea Only        OBJECTIVE:    Physical Exam:  VITAL SIGNS: Vitals:    02/01/22 1023 02/01/22 1108   BP: (!) 174/106 (!) 176/103   Pulse: 96 (!) 102   Weight: 338 lb (153.3 kg)    Height: 5' 10\" (1.778 m)      Body mass index is 48.5 kg/m². GENERAL GEOVANNI: Conversant, alert, oriented. No acute distress. HEENT: HEENT. No thyroid enlargement. No JVD. Neck: Supple without restrictions. RESPIRATORY: Clear to auscultation and percussion to the bases. No CVAT.    CARDIOVASC: RRR without murmur/rub. GASTROINT: soft, non-tender, without masses or organomegaly, obese   MUSCULOSKEL: no joint tenderness, deformity or swelling       EXTREMITIES: extremities normal, atraumatic, no cyanosis or edema   PELVIC: Deferred today for discussion. Exam chaperoned by nurse. Normal appearing external genitalia. On speculum exam, normal appearing vagina and cervix. IUD strings visualized at cervical os. Deferred bimanual exam: On bimanual exam, the cervix and uterus are normal size and mobile. No evidence of adnexal masses or nodularity. RECTAL: deferred   LLOYD SURVEY: No suspicious lymphadenopathy or edema noted. NEURO: Grossly intact. No acute deficit. Lab Date as available:    Lab Results   Component Value Date/Time    WBC 8.7 02/03/2022 09:51 AM    HGB 9.8 (L) 02/03/2022 09:51 AM    HCT 31.3 (L) 02/03/2022 09:51 AM    PLATELET 875 08/52/7893 09:51 AM    MCV 87.7 02/03/2022 09:51 AM     Lab Results   Component Value Date/Time    Sodium 139 02/03/2022 09:51 AM    Potassium 3.8 02/03/2022 09:51 AM    Chloride 109 (H) 02/03/2022 09:51 AM    CO2 27 02/03/2022 09:51 AM    Anion gap 3 (L) 02/03/2022 09:51 AM    Glucose 103 (H) 02/03/2022 09:51 AM    BUN 10 02/03/2022 09:51 AM    Creatinine 0.67 02/03/2022 09:51 AM    BUN/Creatinine ratio 15 02/03/2022 09:51 AM    GFR est AA >60 02/03/2022 09:51 AM    GFR est non-AA >60 02/03/2022 09:51 AM    Calcium 9.0 02/03/2022 09:51 AM         IMPRESSION/PLAN:    Ms. Jose Luis Gunn is a 49 y.o. female with a working diagnosis of PMB with a history of breast cancer. Underwent a hysteroscopy/D&C with Dr. Trevor Dhillon, Gynecologic Oncology in [place] in 11/2020. Her pathology was consistent with \"focally disordered proliferative endometrium. Benign endocervical mucosa. \"     On 11/21/2021 underwent Pelvic exam under anesthesia, cervical dilation, hysteroscopy, MyoSure curetting/polypectomy, Mirena IUD placement.  Final pathology consistent with benign proliferative endometrium. Problems:     Patient Active Problem List    Diagnosis Date Noted    PMB (postmenopausal bleeding) 10/26/2021    Thalassemia     Hypertension     IBS (irritable bowel syndrome)     Esophageal spasm     Morbid obesity ([address]     Diverticulosis     Diverticulitis     Fibromyalgia     Osteoarthritis     Breast cancer ([facility]        Reviewed patient's course to date. Counseled patient that sometimes breakthrough bleeding can occur with an IUD. However, she was also placed on twice daily Megace but continues to have irregular bleeding that is troublesome for the patient. At this point we have discussed continued observation versus a definitive hysterectomy. She is ready to move forward with a hysterectomy. Plan for TLH/BSO, possible laparotomy. We have discussed the role of ovarian preservation versus removal, and she would like to move forward with removal of her ovaries, which is reasonable and appropriate. Will scheduled surgery at the patient's convenience. Reviewed risks, benefits, indications, and alternatives of surgery. Will collect CBCD, CMP, CXR, and EKG prior to surgery. All questions and concerns were addressed with the patient and she is comfortable with the plan. An electronic signature was used to authenticate this note.      Acacia Oates MD

## 2022-02-01 NOTE — PROGRESS NOTES
[address] [facility] [address], [address] [facility]  P [phone]  F [phone]    Office Note  Patient ID:  Name:  Herlinda Jo  MRN:  [id number]  :  1970/51 y.o. Date:  2022      HISTORY OF PRESENT ILLNESS:  Ms. Herlinda Jo is a 49 y.o. female with persistent PMB with a history of breast cancer. Underwent a hysteroscopy/D&C with Dr. Beatriz Corral, Gynecologic Oncology in [place] in 2020. Her pathology was consistent with \"focally disordered proliferative endometrium. Benign endocervical mucosa. \"     On 2021 underwent Pelvic exam under anesthesia, cervical dilation, hysteroscopy, MyoSure curetting/polypectomy, Mirena IUD placement. Final pathology consistent with benign proliferative endometrium. Presents today for problem visit. She was started on Megace twice daily to help stop some of her breakthrough bleeding. She still reports that she has a heavy flow every so often and she is ready for a hysterectomy. She is tired of dealing with the abnormal bleeding and she has had multiple D&C's over the last few years. Initial History:  Ms. Herlinda Jo is a 49 y.o.  postmenopausal female who presents as a new patient from Dr. Ragini Carver for vaginal bleeding/spotting. The patient has a history of triple negative breast cancer diagnosed in 2016 s/p lumpectomy/AC-T chemo and radiation. The patient reports menopause a few years ago while undergoing treatment for her breast cancer. She presented last year with vaginal spotting/bleeding and underwent a hysteroscopy/D&C with Dr. Beatriz Corral, Gynecologic Oncology in [place]. Her pathology was consistent with \"focally disordered proliferative endometrium. Benign endocervical mucosa. \"     She is now followed by Dr. Ragini Carver for her breast cancer. She is referred to our office for continued vaginal spotting/bleeding.      Interval History:  Presents back today for ultrasound results. Pertinent PMH/PSH: breast cancer, HTN, obesity, IBS      Active, no restrictions. Imaging Review:  Pelvic ultrasound 11/3/2021:  FINDINGS:    TRANSABDOMINAL:  The uterus measures 10.2 x 5.3 x 8.3 cm. The endometrial stripe is thickened  measuring 10 mm. The uterus otherwise appears normal.  The bilateral ovaries are not well seen due to overlying bowel gas. TRANSVAGINAL:  The uterus measures 8.7 x 5.5 x 6.9 cm. The endometrial stripe is thickened  measuring 12 mm. The uterus otherwise appears normal and anteverted. The bilateral ovaries are not well seen due to overlying bowel gas. No free fluid in the pelvis. IMPRESSION  1. Thickened endometrial stripe in a postmenopausal patient, with differential  considerations of endometrial hyperplasia, carcinoma, and polyp. Recommend  endometrial biopsy. Pathology Review:   11/2/2020:  A. ECC: benign endocervical mucosa with chronic inflammation  B. Endometrium: focally disordered proliferative endometrium. Benign endocervical mucosa. ROS:  A comprehensive review of systems was negative except for that written in the History of Present Illness.  , 10 point ROS      ECOG stGstrstastdstest:st st1st Problem List:  Patient Active Problem List    Diagnosis Date Noted    PMB (postmenopausal bleeding) 10/26/2021    Thalassemia     Hypertension     IBS (irritable bowel syndrome)     Esophageal spasm     Morbid obesity ([address]     Diverticulosis     Diverticulitis     Fibromyalgia     Osteoarthritis     Breast cancer ([address]      PMH:  Past Medical History:   Diagnosis Date    Arrhythmia     MURMUR    Asthma     AS A CHILD    Breast cancer ([address] 07/21/2016    LEFT    Diverticulitis     Diverticulosis     Esophageal spasm     Fibromyalgia     GERD (gastroesophageal reflux disease)     Heart murmur     History of chemotherapy 01/11/1977    Completed    Hypertension     IBS (irritable bowel syndrome)     Ill-defined condition     LYMPHEDEMA LEFT ARM    Lymphedema     LEFT ARM    Morbid obesity ([facility] [address] [facility]     Nausea & vomiting     Osteoarthritis     PUD (peptic ulcer disease)     YEARS AGO    S/P radiation therapy 04/12/2017    Completed    Thalassemia       PSH:  Past Surgical History:   Procedure Laterality Date    HX BARIATRIC SURGERY  10/2010    lap band    HX BREAST LUMPECTOMY Left 07/2016    CHEMO AND RADIATION    HX COLONOSCOPY  2017    HX DILATION AND CURETTAGE  11/2020, 2021    HX GASTRIC BYPASS  10/2010    LAP BAND    HX KNEE ARTHROSCOPY Bilateral 2008    HX ORTHOPAEDIC  10/2014    right foot and ankle reconstruction for flat feet    HX ORTHOPAEDIC Left 2006    ACHILLES TENDON REPAIR    HX TONSILLECTOMY        Social History:  Social History     Tobacco Use    Smoking status: Never Smoker    Smokeless tobacco: Never Used   Substance Use Topics    Alcohol use: Not Currently      Family History:  Family History   Problem Relation Age of Onset    OSTEOARTHRITIS Mother     Thalassemia Mother     Hypertension Mother     Heart Disease Father         chf    Cancer Father         lung    Hypertension Father     Arthritis Father     Heart Failure Father    [facility] Arthritis-rheumatoid Brother     Hypertension Brother     Hypertension Brother     Elevated Lipids Brother     OSTEOARTHRITIS Brother     OSTEOARTHRITIS Sister     Other Brother         SARCOIDOSIS    Anesth Problems Neg Hx       Medications: (reviewed)  Current Outpatient Medications   Medication Sig    megestroL (MEGACE) 20 mg tablet Take 1 Tablet by mouth two (2) times a day for 30 days. (Patient taking differently: Take 40 mg by mouth daily.)    dilTIAZem ER (CARDIZEM CD) 180 mg capsule Take 1 Capsule by mouth daily.  polyethylene glycol (Miralax) 17 gram packet as needed.  aspirin-acetaminophen-caffeine (Excedrin Extra Strength) 250-250-65 mg per tablet 2 Tablets two (2) times a day.  wheat dextrin/calcium/aspartam (BENEFIBER + CALCIUM SUGAR-FREE PO) daily.     propylene glycoL (Systane Balance) 0.6 % drop Apply 1 Drop to eye two (2) times daily as needed.  biotin 10,000 mcg cap Take 1 Tablet by mouth daily.  cholecalciferol (VITAMIN D3) (5000 Units/125 mcg) tab tablet Take 5,000 Units by mouth daily.  clotrimazole-betamethasone (LOTRISONE) topical cream Apply  to affected area as needed.  cyanocobalamin 1,000 mcg tablet Take 100 mcg by mouth daily.  DULoxetine (CYMBALTA) 60 mg capsule duloxetine 60 mg capsule,delayed release   Take 1 capsule every day by oral route.  folic acid 377 mcg tablet Take 400 mcg by mouth daily.  loratadine (CLARITIN) 10 mg tablet Take 10 mg by mouth daily.  losartan-hydroCHLOROthiazide (HYZAAR) 100-12.5 mg per tablet losartan 100 mg-hydrochlorothiazide 12.5 mg tablet   Take 1 tablet every day by oral route.  multivitamin capsule Take 1 Capsule by mouth daily.  docusate sodium (Colace) 100 mg capsule Take 100 mg by mouth daily. No current facility-administered medications for this visit. Allergies: (reviewed)  Allergies   Allergen Reactions    Other Food Sneezing    Adhesive Other (comments)     Tears skin off. Severe.  Oxycodone Itching    Lisinopril Other (comments)     \"constant tickle in my throat\"  \"constant tickle in my throat\"      Mold Other (comments)    Nickel Other (comments)    Sulfa (Sulfonamide Antibiotics) Other (comments)    Sulfamethoxazole-Trimethoprim Hives    Anesthetics - Amide Type - Select Amino Amides Nausea Only        OBJECTIVE:    Physical Exam:  VITAL SIGNS: Vitals:    02/01/22 1023 02/01/22 1108   BP: (!) 174/106 (!) 176/103   Pulse: 96 (!) 102   Weight: 338 lb (153.3 kg)    Height: 5' 10\" (1.778 m)      Body mass index is 48.5 kg/m². GENERAL GEOVANNI: Conversant, alert, oriented. No acute distress. HEENT: HEENT. No thyroid enlargement. No JVD. Neck: Supple without restrictions. RESPIRATORY: Clear to auscultation and percussion to the bases. No CVAT.    CARDIOVASC: RRR without murmur/rub. GASTROINT: soft, non-tender, without masses or organomegaly, obese   MUSCULOSKEL: no joint tenderness, deformity or swelling       EXTREMITIES: extremities normal, atraumatic, no cyanosis or edema   PELVIC: Deferred today for discussion. Exam chaperoned by nurse. Normal appearing external genitalia. On speculum exam, normal appearing vagina and cervix. IUD strings visualized at cervical os. Deferred bimanual exam: On bimanual exam, the cervix and uterus are normal size and mobile. No evidence of adnexal masses or nodularity. RECTAL: deferred   LLOYD SURVEY: No suspicious lymphadenopathy or edema noted. NEURO: Grossly intact. No acute deficit. Lab Date as available:    Lab Results   Component Value Date/Time    WBC 8.7 02/03/2022 09:51 AM    HGB 9.8 (L) 02/03/2022 09:51 AM    HCT 31.3 (L) 02/03/2022 09:51 AM    PLATELET 717 29/79/6253 09:51 AM    MCV 87.7 02/03/2022 09:51 AM     Lab Results   Component Value Date/Time    Sodium 139 02/03/2022 09:51 AM    Potassium 3.8 02/03/2022 09:51 AM    Chloride 109 (H) 02/03/2022 09:51 AM    CO2 27 02/03/2022 09:51 AM    Anion gap 3 (L) 02/03/2022 09:51 AM    Glucose 103 (H) 02/03/2022 09:51 AM    BUN 10 02/03/2022 09:51 AM    Creatinine 0.67 02/03/2022 09:51 AM    BUN/Creatinine ratio 15 02/03/2022 09:51 AM    GFR est AA >60 02/03/2022 09:51 AM    GFR est non-AA >60 02/03/2022 09:51 AM    Calcium 9.0 02/03/2022 09:51 AM         IMPRESSION/PLAN:    Ms. Janiya Moise is a 49 y.o. female with a working diagnosis of PMB with a history of breast cancer. Underwent a hysteroscopy/D&C with Dr. Tisha Valle, Gynecologic Oncology in [place] in 11/2020. Her pathology was consistent with \"focally disordered proliferative endometrium. Benign endocervical mucosa. \"     On 11/21/2021 underwent Pelvic exam under anesthesia, cervical dilation, hysteroscopy, MyoSure curetting/polypectomy, Mirena IUD placement.  Final pathology consistent with benign proliferative endometrium. Problems:     Patient Active Problem List    Diagnosis Date Noted    PMB (postmenopausal bleeding) 10/26/2021    Thalassemia     Hypertension     IBS (irritable bowel syndrome)     Esophageal spasm     Morbid obesity ([address]     Diverticulosis     Diverticulitis     Fibromyalgia     Osteoarthritis     Breast cancer ([facility]        Reviewed patient's course to date. Counseled patient that sometimes breakthrough bleeding can occur with an IUD. However, she was also placed on twice daily Megace but continues to have irregular bleeding that is troublesome for the patient. At this point we have discussed continued observation versus a definitive hysterectomy. She is ready to move forward with a hysterectomy. Plan for TLH/BSO, possible laparotomy. We have discussed the role of ovarian preservation versus removal, and she would like to move forward with removal of her ovaries, which is reasonable and appropriate. Will scheduled surgery at the patient's convenience. Reviewed risks, benefits, indications, and alternatives of surgery. Will collect CBCD, CMP, CXR, and EKG prior to surgery. All questions and concerns were addressed with the patient and she is comfortable with the plan. An electronic signature was used to authenticate this note.      Felipe Lees MD

## 2022-02-02 ENCOUNTER — TELEPHONE (OUTPATIENT)
Dept: GYNECOLOGY | Age: 52
End: 2022-02-02

## 2022-02-02 NOTE — TELEPHONE ENCOUNTER
Pt had several post op questions regarding time off from work, lifting restrictions, going up and and down stairs, all questions were answered

## 2022-02-03 ENCOUNTER — HOSPITAL ENCOUNTER (OUTPATIENT)
Dept: PREADMISSION TESTING | Age: 52
Discharge: HOME | End: 2022-02-03
Payer: COMMERCIAL

## 2022-02-03 VITALS
HEIGHT: 69 IN | TEMPERATURE: 99 F | WEIGHT: 293 LBS | BODY MASS INDEX: 43.4 KG/M2 | SYSTOLIC BLOOD PRESSURE: 150 MMHG | RESPIRATION RATE: 20 BRPM | DIASTOLIC BLOOD PRESSURE: 82 MMHG | HEART RATE: 97 BPM

## 2022-02-03 LAB
ALBUMIN SERPL-MCNC: 3.5 G/DL (ref 3.5–5)
ALBUMIN/GLOB SERPL: 0.9 {RATIO} (ref 1.1–2.2)
ALP SERPL-CCNC: 132 U/L (ref 45–117)
ALT SERPL-CCNC: 20 U/L (ref 12–78)
ANION GAP SERPL CALC-SCNC: 3 MMOL/L (ref 5–15)
AST SERPL-CCNC: 16 U/L (ref 15–37)
BASOPHILS # BLD: 0 K/UL (ref 0–0.1)
BASOPHILS NFR BLD: 0 % (ref 0–1)
BILIRUB SERPL-MCNC: 0.4 MG/DL (ref 0.2–1)
BUN SERPL-MCNC: 10 MG/DL (ref 6–20)
BUN/CREAT SERPL: 15 (ref 12–20)
CALCIUM SERPL-MCNC: 9 MG/DL (ref 8.5–10.1)
CHLORIDE SERPL-SCNC: 109 MMOL/L (ref 97–108)
CO2 SERPL-SCNC: 27 MMOL/L (ref 21–32)
CREAT SERPL-MCNC: 0.67 MG/DL (ref 0.55–1.02)
DIFFERENTIAL METHOD BLD: ABNORMAL
EOSINOPHIL # BLD: 0.1 K/UL (ref 0–0.4)
EOSINOPHIL NFR BLD: 2 % (ref 0–7)
ERYTHROCYTE [DISTWIDTH] IN BLOOD BY AUTOMATED COUNT: 16.2 % (ref 11.5–14.5)
EST. AVERAGE GLUCOSE BLD GHB EST-MCNC: 108 MG/DL
GLOBULIN SER CALC-MCNC: 3.7 G/DL (ref 2–4)
GLUCOSE SERPL-MCNC: 103 MG/DL (ref 65–100)
HBA1C MFR BLD: 5.4 % (ref 4–5.6)
HCT VFR BLD AUTO: 31.3 % (ref 35–47)
HGB BLD-MCNC: 9.8 G/DL (ref 11.5–16)
IMM GRANULOCYTES # BLD AUTO: 0.1 K/UL (ref 0–0.04)
IMM GRANULOCYTES NFR BLD AUTO: 1 % (ref 0–0.5)
LYMPHOCYTES # BLD: 2.3 K/UL (ref 0.8–3.5)
LYMPHOCYTES NFR BLD: 26 % (ref 12–49)
MCH RBC QN AUTO: 27.5 PG (ref 26–34)
MCHC RBC AUTO-ENTMCNC: 31.3 G/DL (ref 30–36.5)
MCV RBC AUTO: 87.7 FL (ref 80–99)
MONOCYTES # BLD: 0.5 K/UL (ref 0–1)
MONOCYTES NFR BLD: 6 % (ref 5–13)
NEUTS SEG # BLD: 5.7 K/UL (ref 1.8–8)
NEUTS SEG NFR BLD: 65 % (ref 32–75)
NRBC # BLD: 0 K/UL (ref 0–0.01)
NRBC BLD-RTO: 0 PER 100 WBC
PLATELET # BLD AUTO: 321 K/UL (ref 150–400)
PMV BLD AUTO: 10.3 FL (ref 8.9–12.9)
POTASSIUM SERPL-SCNC: 3.8 MMOL/L (ref 3.5–5.1)
PROT SERPL-MCNC: 7.2 G/DL (ref 6.4–8.2)
RBC # BLD AUTO: 3.57 M/UL (ref 3.8–5.2)
SODIUM SERPL-SCNC: 139 MMOL/L (ref 136–145)
WBC # BLD AUTO: 8.7 K/UL (ref 3.6–11)

## 2022-02-03 PROCEDURE — 36415 COLL VENOUS BLD VENIPUNCTURE: CPT

## 2022-02-03 PROCEDURE — 80053 COMPREHEN METABOLIC PANEL: CPT

## 2022-02-03 PROCEDURE — 85025 COMPLETE CBC W/AUTO DIFF WBC: CPT

## 2022-02-03 PROCEDURE — 83036 HEMOGLOBIN GLYCOSYLATED A1C: CPT

## 2022-02-03 RX ORDER — DOCUSATE SODIUM 100 MG/1
100 CAPSULE, LIQUID FILLED ORAL DAILY
COMMUNITY
End: 2022-03-22 | Stop reason: ALTCHOICE

## 2022-02-03 NOTE — PERIOP NOTES
[facility] INSTRUCTIONS    Surgery Date:   2-10-22    [facility] staff will call you between 4 PM- 8 PM the day before surgery with your arrival time. If your surgery is on a Monday, we will call you the preceding Friday. Please call [phone] after 8 PM if you did not receive your arrival time. 1. Please report to [facility] Patient Access/Admitting on the 1st floor. Bring your insurance card, photo identification, and any copayment ( if applicable). 2. If you are going home the same day of your surgery, you must have a responsible adult to drive you home. You need to have a responsible adult to stay with you the first 24 hours after surgery and you should not drive a car for 24 hours following your surgery. 3. Nothing to eat or drink after midnight the night before surgery. This includes no water, gum, mints, coffee, juice, etc.  Please note special instructions, if applicable, below for medications. 4. Do NOT drink alcohol or smoke 24 hours before surgery. STOP smoking for 14 days prior as it helps with breathing and healing after surgery. 5. If you are being admitted to the hospital, please leave personal belongings/luggage in your car until you have an assigned hospital room number. 6. Please wear comfortable clothes. Wear your glasses instead of contacts. We ask that all money, jewelry and valuables be left at home. Wear no make up, particularly mascara, the day of surgery. 7.  All body piercings, rings, and jewelry need to be removed and left at home. Please remove any nail polish or artificial nails from your fingernails. Please wear your hair loose or down. Please no pony-tails, buns, or any metal hair accessories. If you shower the morning of surgery, please do not apply any lotions or powders afterwards. You may wear deodorant, unless having breast surgery. Do not shave any body area within 24 hours of your surgery.   8. Please follow all instructions to avoid any potential surgical cancellation. 9. Should your physical condition change, (i.e. fever, cold, flu, etc.) please notify your surgeon as soon as possible. 10. It is important to be on time. If a situation occurs where you may be delayed, please call:  [phone] / [phone] on the day of surgery. 11. The Preadmission Testing staff can be reached at [phone]. 12. Special instructions: NONE      Current Outpatient Medications   Medication Sig    docusate sodium (Colace) 100 mg capsule Take 100 mg by mouth daily.  megestroL (MEGACE) 20 mg tablet Take 1 Tablet by mouth two (2) times a day for 30 days. (Patient taking differently: Take 40 mg by mouth daily.)    dilTIAZem ER (CARDIZEM CD) 180 mg capsule Take 1 Capsule by mouth daily.  polyethylene glycol (Miralax) 17 gram packet as needed.  aspirin-acetaminophen-caffeine (Excedrin Extra Strength) 250-250-65 mg per tablet 2 Tablets two (2) times a day.  propylene glycoL (Systane Balance) 0.6 % drop Apply 1 Drop to eye two (2) times daily as needed.  biotin 10,000 mcg cap Take 1 Tablet by mouth daily.  cholecalciferol (VITAMIN D3) (5000 Units/125 mcg) tab tablet Take 5,000 Units by mouth daily.  clotrimazole-betamethasone (LOTRISONE) topical cream Apply  to affected area as needed.  cyanocobalamin 1,000 mcg tablet Take 100 mcg by mouth daily.  DULoxetine (CYMBALTA) 60 mg capsule duloxetine 60 mg capsule,delayed release   Take 1 capsule every day by oral route.  folic acid 708 mcg tablet Take 400 mcg by mouth daily.  loratadine (CLARITIN) 10 mg tablet Take 10 mg by mouth daily.  losartan-hydroCHLOROthiazide (HYZAAR) 100-12.5 mg per tablet losartan 100 mg-hydrochlorothiazide 12.5 mg tablet   Take 1 tablet every day by oral route.  multivitamin capsule Take 1 Capsule by mouth daily.  wheat dextrin/calcium/aspartam (BENEFIBER + CALCIUM SUGAR-FREE PO) daily.      No current facility-administered medications for this encounter. 1. YOU MUST ONLY TAKE THESE MEDICATIONS THE MORNING OF SURGERY WITH A SIP OF WATER: DILTIAZEM, CYMBALTA  2. MEDICATIONS TO TAKE THE MORNING OF SURGERY ONLY IF NEEDED: NONE  3. HOLD these prescription medications BEFORE Surgery: EXCEDRIN, MULTIVITAMIN HOLD STARTING TODAY 2-3-22  4. Ask your surgeon/prescribing physician about when/if to STOP taking these medications: MEGACE  5. Stop all vitamins, herbal medicines and Aspirin containing products 7 days prior to surgery. Stop any non-steroidal anti-inflammatory drugs (i.e. Ibuprofen, Naproxen, Advil, Aleve) 3 days before surgery. You may take Tylenol. 6. If you are currently taking Plavix, Coumadin, or any other blood-thinning/anticoagulant medication contact your prescribing physician for instructions. Preventing Infections Before and After - Your Surgery    IMPORTANT INSTRUCTIONS    Please read and follow these instructions carefully. If you are unable to comply with the below instructions your procedure will be cancelled. You play an important role in your health and preparation for surgery. To reduce the germs on your skin you will need to shower with CHG soap (Chorhexidine gluconate 4%) two times before surgery. CHG soap (Hibiclens, Hex-A-Clens or store brand)   CHG soap will be provided at your Preadmission Testing (PAT) appointment.  If you do not have a PAT appointment before surgery, you may arrange to  CHG soap from our office or purchase CHG soap at a pharmacy, grocery or department store.  You need to purchase TWO 4 ounce bottles to use for your 2 showers. Steps to follow:  1. Wash your hair with your normal shampoo and your body with regular soap and rinse well to remove shampoo and soap from your skin. 2. Wet a clean washcloth and turn off the shower. 3. Put CHG soap on washcloth and apply to your entire body from the neck down. Do not use on your head, face or private parts(genitals).  Do not use CHG soap on open sores, wounds or areas of skin irritation. 4. Wash you body gently for 5 minutes. Do not wash your skin too hard. This soap does not create lather. Pay special attention to your underarms and from your belly button to your feet. 5. Turn the shower back on and rinse well to get CHG soap off your body. 6. Pat your skin dry with a clean, dry towel. Do not apply lotions or moisturizer. 7. Put on clean clothes and sleep on fresh bed sheets and do not allow pets to sleep with you. Shower with CHG soap 2 times before your surgery   The evening before your surgery   The morning of your surgery      Tips to help prevent infections after your surgery:  1. Protect your surgical wound from germs:  ? Hand washing is the most important thing you and your caregivers can do to prevent infections. ? Keep your bandage clean and dry! ? Do not touch your surgical wound. 2. Use clean, freshly washed towels and washcloths every time you shower; do not share bath linens with others. 3. Until your surgical wound is healed, wear clothing and sleep on bed linens each day that are clean and freshly washed. 4. Do not allow pets to sleep in your bed with you or touch your surgical wound. 5. Do not smoke - smoking delays wound healing. This may be a good time to stop smoking. 6. If you have diabetes, it is important for you to manage your blood sugar levels properly before your surgery as well as after your surgery. Poorly managed blood sugar levels slow down wound healing and prevent you from healing completely. [facility]   Instructions for Pre-Surgery COVID-19 Testing     Across our ministry we have established standard guidelines to ensure the health and safety of our patients, residents and associates as we resume elective services for patients. All patients presenting for surgery are required to have a COVID-19 test result within 96 hours of their scheduled surgery.  [facility] is providing this test free of charge to the patient. Instructions for COVID-19 Testing:     Patients will receive a call from Pre-Admission Testing 4-5 days prior to surgery to schedule a date and time to come to the  [address] for their COVID-19 test   Patients are advised to self-quarantine after testing until their scheduled surgery   Once on site, patients will be registered and receive COVID test in their vehicle   If a patient is scheduled for normal Pre Admission Testing 96 hours from date of surgery, the patient will still have their COVID test done at the [address] [place] [address] located at [address] [place] [address] Positive results will be shared with the surgeon and anesthesiologist and may result in cancellation of the elective procedure    Testing Hours and Location:   Address:  [address] [facility] [address] Pre Admission [address] [facility] in the Discharge Lot on [facility] (Map Attached)  [address] [place] [address], [address] [place] [address] Ave   Hours: Monday- Friday 7a-3p    PAT Phone Number: [phone]            Patient Information Regarding COVID Restrictions    Patients are advised to self-quarantine after COVID testing up to the day of the scheduled procedure. Day of Procedure     Please park in the parking deck or any designated visitor parking lot.  Enter the facility through the Main Entrance of the hospital.   A temperature check and appropriate symptom/exposure screening will be done prior to entry to the facility.  On the day of surgery, please provide the cell phone number of the person who will be waiting for you to the Patient Access representative at the time of registration.  Please wear a mask on the day of your procedure.  We are now allowing one designated visitor per stay. Pediatric patients may have 2 designated visitors. This one person may come in with you on the day of your procedure.  No visitors under the age of 13.    The designated visitor must also wear a mask.  Once your procedure and the immediate recovery period is completed, a nurse in the recovery area will contact your designated visitor to inform them of your room number or to otherwise review other pertinent information regarding your care.  Social distancing practices are to be adhered to in waiting areas and the cafeteria. The patient was contacted  in person. She verbalized understanding of all instructions does not  need reinforcement.

## 2022-02-07 ENCOUNTER — HOSPITAL ENCOUNTER (OUTPATIENT)
Dept: PREADMISSION TESTING | Age: 52
Discharge: HOME | End: 2022-02-07
Attending: OBSTETRICS & GYNECOLOGY
Payer: COMMERCIAL

## 2022-02-07 ENCOUNTER — PATIENT MESSAGE (OUTPATIENT)
Dept: GYNECOLOGY | Age: 52
End: 2022-02-07

## 2022-02-07 DIAGNOSIS — Z01.812 PRE-PROCEDURE LAB EXAM: ICD-10-CM

## 2022-02-07 LAB
SARS-COV-2, XPLCVT: NOT DETECTED
SOURCE, COVRS: NORMAL

## 2022-02-07 PROCEDURE — U0005 INFEC AGEN DETEC AMPLI PROBE: HCPCS

## 2022-02-10 ENCOUNTER — ANESTHESIA (OUTPATIENT)
Dept: SURGERY | Age: 52
End: 2022-02-10
Payer: COMMERCIAL

## 2022-02-10 ENCOUNTER — HOSPITAL ENCOUNTER (OUTPATIENT)
Age: 52
Discharge: HOME | End: 2022-02-10
Attending: OBSTETRICS & GYNECOLOGY | Admitting: OBSTETRICS & GYNECOLOGY
Payer: COMMERCIAL

## 2022-02-10 ENCOUNTER — ANESTHESIA EVENT (OUTPATIENT)
Dept: SURGERY | Age: 52
End: 2022-02-10
Payer: COMMERCIAL

## 2022-02-10 VITALS
WEIGHT: 293 LBS | BODY MASS INDEX: 43.4 KG/M2 | HEIGHT: 69 IN | TEMPERATURE: 98.7 F | HEART RATE: 80 BPM | SYSTOLIC BLOOD PRESSURE: 116 MMHG | RESPIRATION RATE: 16 BRPM | DIASTOLIC BLOOD PRESSURE: 79 MMHG | OXYGEN SATURATION: 98 %

## 2022-02-10 DIAGNOSIS — G89.18 POSTOPERATIVE PAIN: Primary | ICD-10-CM

## 2022-02-10 PROBLEM — N95.0 POSTMENOPAUSAL BLEEDING: Status: ACTIVE | Noted: 2022-02-10

## 2022-02-10 LAB — HCG UR QL: NEGATIVE

## 2022-02-10 PROCEDURE — 77030009957 HC RELD ENDOSTCH COVD -C: Performed by: OBSTETRICS & GYNECOLOGY

## 2022-02-10 PROCEDURE — 76060000035 HC ANESTHESIA 2 TO 2.5 HR: Performed by: OBSTETRICS & GYNECOLOGY

## 2022-02-10 PROCEDURE — 77030014008 HC SPNG HEMSTAT J&J -C: Performed by: OBSTETRICS & GYNECOLOGY

## 2022-02-10 PROCEDURE — 81025 URINE PREGNANCY TEST: CPT

## 2022-02-10 PROCEDURE — 77030039895 HC SYST SMK EVAC LAP COVD -B: Performed by: OBSTETRICS & GYNECOLOGY

## 2022-02-10 PROCEDURE — 77030008684 HC TU ET CUF COVD -B: Performed by: NURSE ANESTHETIST, CERTIFIED REGISTERED

## 2022-02-10 PROCEDURE — 77030010031 HC SCIS ENDOSC MPLR J&J -C: Performed by: OBSTETRICS & GYNECOLOGY

## 2022-02-10 PROCEDURE — 77030018778 HC MANIP UTER VCAR CNMD -B: Performed by: OBSTETRICS & GYNECOLOGY

## 2022-02-10 PROCEDURE — 77030022704 HC SUT VLOC COVD -B: Performed by: OBSTETRICS & GYNECOLOGY

## 2022-02-10 PROCEDURE — 74011000250 HC RX REV CODE- 250: Performed by: OBSTETRICS & GYNECOLOGY

## 2022-02-10 PROCEDURE — 74011250636 HC RX REV CODE- 250/636: Performed by: ANESTHESIOLOGY

## 2022-02-10 PROCEDURE — 74011250637 HC RX REV CODE- 250/637: Performed by: PHYSICIAN ASSISTANT

## 2022-02-10 PROCEDURE — 74011250636 HC RX REV CODE- 250/636: Performed by: NURSE ANESTHETIST, CERTIFIED REGISTERED

## 2022-02-10 PROCEDURE — 76010000131 HC OR TIME 2 TO 2.5 HR: Performed by: OBSTETRICS & GYNECOLOGY

## 2022-02-10 PROCEDURE — 77030040922 HC BLNKT HYPOTHRM STRY -A

## 2022-02-10 PROCEDURE — 77030012799 HC TRCR GELPRT BLN AMR -B: Performed by: OBSTETRICS & GYNECOLOGY

## 2022-02-10 PROCEDURE — 74011000250 HC RX REV CODE- 250: Performed by: NURSE ANESTHETIST, CERTIFIED REGISTERED

## 2022-02-10 PROCEDURE — 76210000016 HC OR PH I REC 1 TO 1.5 HR: Performed by: OBSTETRICS & GYNECOLOGY

## 2022-02-10 PROCEDURE — 74011250636 HC RX REV CODE- 250/636: Performed by: PHYSICIAN ASSISTANT

## 2022-02-10 PROCEDURE — 74011250636 HC RX REV CODE- 250/636: Performed by: OBSTETRICS & GYNECOLOGY

## 2022-02-10 PROCEDURE — 74011250637 HC RX REV CODE- 250/637: Performed by: ANESTHESIOLOGY

## 2022-02-10 PROCEDURE — 77030008602 HC TRCR ENDOSC EPATH J&J -B: Performed by: OBSTETRICS & GYNECOLOGY

## 2022-02-10 PROCEDURE — 2709999900 HC NON-CHARGEABLE SUPPLY: Performed by: OBSTETRICS & GYNECOLOGY

## 2022-02-10 PROCEDURE — 74011000250 HC RX REV CODE- 250: Performed by: PHYSICIAN ASSISTANT

## 2022-02-10 PROCEDURE — 77030002933 HC SUT MCRYL J&J -A: Performed by: OBSTETRICS & GYNECOLOGY

## 2022-02-10 PROCEDURE — 77030031139 HC SUT VCRL2 J&J -A: Performed by: OBSTETRICS & GYNECOLOGY

## 2022-02-10 PROCEDURE — 88307 TISSUE EXAM BY PATHOLOGIST: CPT

## 2022-02-10 PROCEDURE — 77030008603 HC TRCR ENDOSC EPATH J&J -C: Performed by: OBSTETRICS & GYNECOLOGY

## 2022-02-10 RX ORDER — ROCURONIUM BROMIDE 10 MG/ML
INJECTION, SOLUTION INTRAVENOUS AS NEEDED
Status: DISCONTINUED | OUTPATIENT
Start: 2022-02-10 | End: 2022-02-10 | Stop reason: HOSPADM

## 2022-02-10 RX ORDER — SODIUM CHLORIDE 0.9 % (FLUSH) 0.9 %
5-40 SYRINGE (ML) INJECTION AS NEEDED
Status: DISCONTINUED | OUTPATIENT
Start: 2022-02-10 | End: 2022-02-10 | Stop reason: HOSPADM

## 2022-02-10 RX ORDER — SODIUM CHLORIDE 0.9 % (FLUSH) 0.9 %
5-40 SYRINGE (ML) INJECTION EVERY 8 HOURS
Status: DISCONTINUED | OUTPATIENT
Start: 2022-02-10 | End: 2022-02-10 | Stop reason: HOSPADM

## 2022-02-10 RX ORDER — ACETAMINOPHEN 325 MG/1
975 TABLET ORAL EVERY 6 HOURS
Status: DISCONTINUED | OUTPATIENT
Start: 2022-02-10 | End: 2022-02-10 | Stop reason: HOSPADM

## 2022-02-10 RX ORDER — DULOXETIN HYDROCHLORIDE 60 MG/1
60 CAPSULE, DELAYED RELEASE ORAL
Status: DISCONTINUED | OUTPATIENT
Start: 2022-02-10 | End: 2022-02-10 | Stop reason: HOSPADM

## 2022-02-10 RX ORDER — LIDOCAINE HYDROCHLORIDE 10 MG/ML
0.1 INJECTION, SOLUTION EPIDURAL; INFILTRATION; INTRACAUDAL; PERINEURAL AS NEEDED
Status: DISCONTINUED | OUTPATIENT
Start: 2022-02-10 | End: 2022-02-10 | Stop reason: HOSPADM

## 2022-02-10 RX ORDER — SUCCINYLCHOLINE CHLORIDE 20 MG/ML
INJECTION INTRAMUSCULAR; INTRAVENOUS AS NEEDED
Status: DISCONTINUED | OUTPATIENT
Start: 2022-02-10 | End: 2022-02-10 | Stop reason: HOSPADM

## 2022-02-10 RX ORDER — KETOROLAC TROMETHAMINE 30 MG/ML
15 INJECTION, SOLUTION INTRAMUSCULAR; INTRAVENOUS EVERY 6 HOURS
Status: DISCONTINUED | OUTPATIENT
Start: 2022-02-10 | End: 2022-02-10 | Stop reason: HOSPADM

## 2022-02-10 RX ORDER — SODIUM CHLORIDE, SODIUM LACTATE, POTASSIUM CHLORIDE, CALCIUM CHLORIDE 600; 310; 30; 20 MG/100ML; MG/100ML; MG/100ML; MG/100ML
100 INJECTION, SOLUTION INTRAVENOUS CONTINUOUS
Status: DISCONTINUED | OUTPATIENT
Start: 2022-02-10 | End: 2022-02-10 | Stop reason: HOSPADM

## 2022-02-10 RX ORDER — HYDROMORPHONE HYDROCHLORIDE 1 MG/ML
0.5 INJECTION, SOLUTION INTRAMUSCULAR; INTRAVENOUS; SUBCUTANEOUS
Status: DISCONTINUED | OUTPATIENT
Start: 2022-02-10 | End: 2022-02-10 | Stop reason: HOSPADM

## 2022-02-10 RX ORDER — PROPOFOL 10 MG/ML
INJECTION, EMULSION INTRAVENOUS AS NEEDED
Status: DISCONTINUED | OUTPATIENT
Start: 2022-02-10 | End: 2022-02-10 | Stop reason: HOSPADM

## 2022-02-10 RX ORDER — MIDAZOLAM HYDROCHLORIDE 1 MG/ML
1 INJECTION, SOLUTION INTRAMUSCULAR; INTRAVENOUS AS NEEDED
Status: DISCONTINUED | OUTPATIENT
Start: 2022-02-10 | End: 2022-02-10 | Stop reason: HOSPADM

## 2022-02-10 RX ORDER — ONDANSETRON 2 MG/ML
4 INJECTION INTRAMUSCULAR; INTRAVENOUS
Status: DISCONTINUED | OUTPATIENT
Start: 2022-02-10 | End: 2022-02-10 | Stop reason: HOSPADM

## 2022-02-10 RX ORDER — TRAMADOL HYDROCHLORIDE 50 MG/1
50 TABLET ORAL
Qty: 20 TABLET | Refills: 0 | Status: SHIPPED | OUTPATIENT
Start: 2022-02-10 | End: 2022-02-15

## 2022-02-10 RX ORDER — SODIUM CHLORIDE 9 MG/ML
125 INJECTION, SOLUTION INTRAVENOUS CONTINUOUS
Status: DISCONTINUED | OUTPATIENT
Start: 2022-02-10 | End: 2022-02-10 | Stop reason: HOSPADM

## 2022-02-10 RX ORDER — GLYCOPYRROLATE 0.2 MG/ML
INJECTION INTRAMUSCULAR; INTRAVENOUS AS NEEDED
Status: DISCONTINUED | OUTPATIENT
Start: 2022-02-10 | End: 2022-02-10 | Stop reason: HOSPADM

## 2022-02-10 RX ORDER — ACETAMINOPHEN 325 MG/1
650 TABLET ORAL
Qty: 60 TABLET | Refills: 0 | Status: SHIPPED | OUTPATIENT
Start: 2022-02-10 | End: 2022-02-15

## 2022-02-10 RX ORDER — FENTANYL CITRATE 50 UG/ML
25 INJECTION, SOLUTION INTRAMUSCULAR; INTRAVENOUS
Status: COMPLETED | OUTPATIENT
Start: 2022-02-10 | End: 2022-02-10

## 2022-02-10 RX ORDER — ONDANSETRON 2 MG/ML
4 INJECTION INTRAMUSCULAR; INTRAVENOUS AS NEEDED
Status: DISCONTINUED | OUTPATIENT
Start: 2022-02-10 | End: 2022-02-10 | Stop reason: HOSPADM

## 2022-02-10 RX ORDER — MIDAZOLAM HYDROCHLORIDE 1 MG/ML
INJECTION, SOLUTION INTRAMUSCULAR; INTRAVENOUS AS NEEDED
Status: DISCONTINUED | OUTPATIENT
Start: 2022-02-10 | End: 2022-02-10 | Stop reason: HOSPADM

## 2022-02-10 RX ORDER — DIPHENHYDRAMINE HYDROCHLORIDE 50 MG/ML
12.5 INJECTION, SOLUTION INTRAMUSCULAR; INTRAVENOUS
Status: DISCONTINUED | OUTPATIENT
Start: 2022-02-10 | End: 2022-02-10 | Stop reason: HOSPADM

## 2022-02-10 RX ORDER — MIDAZOLAM HYDROCHLORIDE 1 MG/ML
0.5 INJECTION, SOLUTION INTRAMUSCULAR; INTRAVENOUS
Status: DISCONTINUED | OUTPATIENT
Start: 2022-02-10 | End: 2022-02-10 | Stop reason: HOSPADM

## 2022-02-10 RX ORDER — ROPIVACAINE HYDROCHLORIDE 5 MG/ML
30 INJECTION, SOLUTION EPIDURAL; INFILTRATION; PERINEURAL AS NEEDED
Status: DISCONTINUED | OUTPATIENT
Start: 2022-02-10 | End: 2022-02-10 | Stop reason: HOSPADM

## 2022-02-10 RX ORDER — LOSARTAN POTASSIUM 50 MG/1
100 TABLET ORAL DAILY
Status: DISCONTINUED | OUTPATIENT
Start: 2022-02-11 | End: 2022-02-10 | Stop reason: HOSPADM

## 2022-02-10 RX ORDER — LIDOCAINE HYDROCHLORIDE 20 MG/ML
INJECTION, SOLUTION EPIDURAL; INFILTRATION; INTRACAUDAL; PERINEURAL AS NEEDED
Status: DISCONTINUED | OUTPATIENT
Start: 2022-02-10 | End: 2022-02-10 | Stop reason: HOSPADM

## 2022-02-10 RX ORDER — ONDANSETRON 4 MG/1
4 TABLET, ORALLY DISINTEGRATING ORAL
Qty: 20 TABLET | Refills: 0 | Status: SHIPPED | OUTPATIENT
Start: 2022-02-10 | End: 2022-10-05 | Stop reason: ALTCHOICE

## 2022-02-10 RX ORDER — TRAMADOL HYDROCHLORIDE 50 MG/1
50-100 TABLET ORAL
Status: DISCONTINUED | OUTPATIENT
Start: 2022-02-10 | End: 2022-02-10 | Stop reason: HOSPADM

## 2022-02-10 RX ORDER — DOCUSATE SODIUM 100 MG/1
100 CAPSULE, LIQUID FILLED ORAL DAILY
Status: DISCONTINUED | OUTPATIENT
Start: 2022-02-11 | End: 2022-02-10 | Stop reason: SDUPTHER

## 2022-02-10 RX ORDER — SODIUM CHLORIDE 9 MG/ML
25 INJECTION, SOLUTION INTRAVENOUS CONTINUOUS
Status: DISCONTINUED | OUTPATIENT
Start: 2022-02-10 | End: 2022-02-10 | Stop reason: HOSPADM

## 2022-02-10 RX ORDER — PROCHLORPERAZINE EDISYLATE 5 MG/ML
10 INJECTION INTRAMUSCULAR; INTRAVENOUS
Status: DISCONTINUED | OUTPATIENT
Start: 2022-02-10 | End: 2022-02-10

## 2022-02-10 RX ORDER — BUPIVACAINE HYDROCHLORIDE 5 MG/ML
INJECTION, SOLUTION EPIDURAL; INTRACAUDAL AS NEEDED
Status: DISCONTINUED | OUTPATIENT
Start: 2022-02-10 | End: 2022-02-10 | Stop reason: HOSPADM

## 2022-02-10 RX ORDER — HYDROMORPHONE HYDROCHLORIDE 2 MG/ML
INJECTION, SOLUTION INTRAMUSCULAR; INTRAVENOUS; SUBCUTANEOUS AS NEEDED
Status: DISCONTINUED | OUTPATIENT
Start: 2022-02-10 | End: 2022-02-10 | Stop reason: HOSPADM

## 2022-02-10 RX ORDER — ACETAMINOPHEN 325 MG/1
650 TABLET ORAL ONCE
Status: COMPLETED | OUTPATIENT
Start: 2022-02-10 | End: 2022-02-10

## 2022-02-10 RX ORDER — SODIUM CHLORIDE, SODIUM LACTATE, POTASSIUM CHLORIDE, CALCIUM CHLORIDE 600; 310; 30; 20 MG/100ML; MG/100ML; MG/100ML; MG/100ML
INJECTION, SOLUTION INTRAVENOUS
Status: DISCONTINUED | OUTPATIENT
Start: 2022-02-10 | End: 2022-02-10 | Stop reason: HOSPADM

## 2022-02-10 RX ORDER — NALOXONE HYDROCHLORIDE 0.4 MG/ML
0.4 INJECTION, SOLUTION INTRAMUSCULAR; INTRAVENOUS; SUBCUTANEOUS AS NEEDED
Status: DISCONTINUED | OUTPATIENT
Start: 2022-02-10 | End: 2022-02-10 | Stop reason: HOSPADM

## 2022-02-10 RX ORDER — MORPHINE SULFATE 2 MG/ML
2 INJECTION, SOLUTION INTRAMUSCULAR; INTRAVENOUS
Status: DISCONTINUED | OUTPATIENT
Start: 2022-02-10 | End: 2022-02-10 | Stop reason: HOSPADM

## 2022-02-10 RX ORDER — DOCUSATE SODIUM 100 MG/1
100 CAPSULE, LIQUID FILLED ORAL 2 TIMES DAILY
Qty: 40 CAPSULE | Refills: 1 | Status: SHIPPED | OUTPATIENT
Start: 2022-02-10 | End: 2022-03-22 | Stop reason: ALTCHOICE

## 2022-02-10 RX ORDER — DIPHENHYDRAMINE HYDROCHLORIDE 50 MG/ML
12.5 INJECTION, SOLUTION INTRAMUSCULAR; INTRAVENOUS AS NEEDED
Status: DISCONTINUED | OUTPATIENT
Start: 2022-02-10 | End: 2022-02-10 | Stop reason: HOSPADM

## 2022-02-10 RX ORDER — PHENYLEPHRINE HCL IN 0.9% NACL 0.4MG/10ML
SYRINGE (ML) INTRAVENOUS AS NEEDED
Status: DISCONTINUED | OUTPATIENT
Start: 2022-02-10 | End: 2022-02-10 | Stop reason: HOSPADM

## 2022-02-10 RX ORDER — DEXAMETHASONE SODIUM PHOSPHATE 4 MG/ML
INJECTION, SOLUTION INTRA-ARTICULAR; INTRALESIONAL; INTRAMUSCULAR; INTRAVENOUS; SOFT TISSUE AS NEEDED
Status: DISCONTINUED | OUTPATIENT
Start: 2022-02-10 | End: 2022-02-10 | Stop reason: HOSPADM

## 2022-02-10 RX ORDER — FENTANYL CITRATE 50 UG/ML
INJECTION, SOLUTION INTRAMUSCULAR; INTRAVENOUS AS NEEDED
Status: DISCONTINUED | OUTPATIENT
Start: 2022-02-10 | End: 2022-02-10 | Stop reason: HOSPADM

## 2022-02-10 RX ORDER — ONDANSETRON 2 MG/ML
INJECTION INTRAMUSCULAR; INTRAVENOUS AS NEEDED
Status: DISCONTINUED | OUTPATIENT
Start: 2022-02-10 | End: 2022-02-10 | Stop reason: HOSPADM

## 2022-02-10 RX ORDER — DILTIAZEM HYDROCHLORIDE 180 MG/1
180 CAPSULE, COATED, EXTENDED RELEASE ORAL DAILY
Status: DISCONTINUED | OUTPATIENT
Start: 2022-02-11 | End: 2022-02-10 | Stop reason: HOSPADM

## 2022-02-10 RX ORDER — DOCUSATE SODIUM 100 MG/1
200 CAPSULE, LIQUID FILLED ORAL DAILY
Status: DISCONTINUED | OUTPATIENT
Start: 2022-02-11 | End: 2022-02-10 | Stop reason: HOSPADM

## 2022-02-10 RX ORDER — FENTANYL CITRATE 50 UG/ML
50 INJECTION, SOLUTION INTRAMUSCULAR; INTRAVENOUS AS NEEDED
Status: DISCONTINUED | OUTPATIENT
Start: 2022-02-10 | End: 2022-02-10 | Stop reason: HOSPADM

## 2022-02-10 RX ORDER — SCOLOPAMINE TRANSDERMAL SYSTEM 1 MG/1
1 PATCH, EXTENDED RELEASE TRANSDERMAL
Status: DISCONTINUED | OUTPATIENT
Start: 2022-02-10 | End: 2022-02-10 | Stop reason: HOSPADM

## 2022-02-10 RX ORDER — IBUPROFEN 200 MG
600 TABLET ORAL
Qty: 40 TABLET | Refills: 0 | Status: SHIPPED | OUTPATIENT
Start: 2022-02-10 | End: 2022-02-14

## 2022-02-10 RX ADMIN — PROPOFOL 200 MG: 10 INJECTION, EMULSION INTRAVENOUS at 11:07

## 2022-02-10 RX ADMIN — ROCURONIUM BROMIDE 5 MG: 10 SOLUTION INTRAVENOUS at 11:07

## 2022-02-10 RX ADMIN — SUGAMMADEX 400 MG: 100 INJECTION, SOLUTION INTRAVENOUS at 12:48

## 2022-02-10 RX ADMIN — SODIUM CHLORIDE, PRESERVATIVE FREE 10 ML: 5 INJECTION INTRAVENOUS at 15:00

## 2022-02-10 RX ADMIN — HYDROMORPHONE HYDROCHLORIDE 0.5 MG: 2 INJECTION, SOLUTION INTRAMUSCULAR; INTRAVENOUS; SUBCUTANEOUS at 12:54

## 2022-02-10 RX ADMIN — Medication 120 MCG: at 12:02

## 2022-02-10 RX ADMIN — ROCURONIUM BROMIDE 25 MG: 10 SOLUTION INTRAVENOUS at 11:44

## 2022-02-10 RX ADMIN — FENTANYL CITRATE 25 MCG: 50 INJECTION, SOLUTION INTRAMUSCULAR; INTRAVENOUS at 13:49

## 2022-02-10 RX ADMIN — SODIUM CHLORIDE 125 ML/HR: 9 INJECTION, SOLUTION INTRAVENOUS at 13:56

## 2022-02-10 RX ADMIN — ONDANSETRON HYDROCHLORIDE 4 MG: 2 INJECTION, SOLUTION INTRAMUSCULAR; INTRAVENOUS at 12:38

## 2022-02-10 RX ADMIN — FENTANYL CITRATE 25 MCG: 50 INJECTION, SOLUTION INTRAMUSCULAR; INTRAVENOUS at 13:40

## 2022-02-10 RX ADMIN — SUCCINYLCHOLINE CHLORIDE 200 MG: 20 INJECTION, SOLUTION INTRAMUSCULAR; INTRAVENOUS at 11:07

## 2022-02-10 RX ADMIN — Medication 80 MCG: at 11:38

## 2022-02-10 RX ADMIN — MIDAZOLAM 2 MG: 1 INJECTION INTRAMUSCULAR; INTRAVENOUS at 11:01

## 2022-02-10 RX ADMIN — KETOROLAC TROMETHAMINE 15 MG: 30 INJECTION, SOLUTION INTRAMUSCULAR; INTRAVENOUS at 14:03

## 2022-02-10 RX ADMIN — FENTANYL CITRATE 25 MCG: 50 INJECTION, SOLUTION INTRAMUSCULAR; INTRAVENOUS at 13:57

## 2022-02-10 RX ADMIN — ROCURONIUM BROMIDE 45 MG: 10 SOLUTION INTRAVENOUS at 11:12

## 2022-02-10 RX ADMIN — SODIUM CHLORIDE, POTASSIUM CHLORIDE, SODIUM LACTATE AND CALCIUM CHLORIDE: 600; 310; 30; 20 INJECTION, SOLUTION INTRAVENOUS at 10:48

## 2022-02-10 RX ADMIN — CEFOXITIN SODIUM 2 G: 2 POWDER, FOR SOLUTION INTRAVENOUS at 11:26

## 2022-02-10 RX ADMIN — FENTANYL CITRATE 100 MCG: 50 INJECTION, SOLUTION INTRAMUSCULAR; INTRAVENOUS at 11:07

## 2022-02-10 RX ADMIN — ACETAMINOPHEN 650 MG: 325 TABLET ORAL at 09:16

## 2022-02-10 RX ADMIN — SODIUM CHLORIDE, POTASSIUM CHLORIDE, SODIUM LACTATE AND CALCIUM CHLORIDE 100 ML/HR: 600; 310; 30; 20 INJECTION, SOLUTION INTRAVENOUS at 09:24

## 2022-02-10 RX ADMIN — GLYCOPYRROLATE 0.1 MG: 0.2 INJECTION, SOLUTION INTRAMUSCULAR; INTRAVENOUS at 11:01

## 2022-02-10 RX ADMIN — DEXAMETHASONE SODIUM PHOSPHATE 6 MG: 4 INJECTION, SOLUTION INTRAMUSCULAR; INTRAVENOUS at 11:34

## 2022-02-10 RX ADMIN — Medication 120 MCG: at 12:17

## 2022-02-10 RX ADMIN — LIDOCAINE HYDROCHLORIDE 100 MG: 20 INJECTION, SOLUTION EPIDURAL; INFILTRATION; INTRACAUDAL; PERINEURAL at 11:07

## 2022-02-10 RX ADMIN — FENTANYL CITRATE 25 MCG: 50 INJECTION, SOLUTION INTRAMUSCULAR; INTRAVENOUS at 13:21

## 2022-02-10 RX ADMIN — HYDROMORPHONE HYDROCHLORIDE 0.5 MG: 1 INJECTION, SOLUTION INTRAMUSCULAR; INTRAVENOUS; SUBCUTANEOUS at 14:03

## 2022-02-10 RX ADMIN — HYDROMORPHONE HYDROCHLORIDE 0.5 MG: 2 INJECTION, SOLUTION INTRAMUSCULAR; INTRAVENOUS; SUBCUTANEOUS at 12:52

## 2022-02-10 RX ADMIN — ACETAMINOPHEN 975 MG: 325 TABLET ORAL at 17:59

## 2022-02-10 NOTE — PROGRESS NOTES
Problem: Falls - Risk of  Goal: *Absence of Falls  Description: Document Kevin Lagunas Fall Risk and appropriate interventions in the flowsheet. 2/10/2022 1749 by Gale Hill RN  Outcome: Resolved/Met  Note: Fall Risk Interventions:  Mobility Interventions: Patient to call before getting OOB         Medication Interventions: Patient to call before getting OOB                2/10/2022 1600 by Gale Hill RN  Outcome: Progressing Towards Goal  Note: Fall Risk Interventions:  Mobility Interventions: Patient to call before getting OOB         Medication Interventions: Patient to call before getting OOB                   Problem: Patient Education: Go to Patient Education Activity  Goal: Patient/Family Education  2/10/2022 1749 by Gale Hill RN  Outcome: Resolved/Met  2/10/2022 1600 by Gale Hill RN  Outcome: Progressing Towards Goal     Problem: Pressure Injury - Risk of  Goal: *Prevention of pressure injury  Description: Document Vince Scale and appropriate interventions in the flowsheet. 2/10/2022 1749 by Gale Hill RN  Outcome: Resolved/Met  Note: Pressure Injury Interventions: Activity Interventions: Increase time out of bed         Nutrition Interventions: Document food/fluid/supplement intake                  2/10/2022 1600 by Gale Hill RN  Outcome: Progressing Towards Goal  Note: Pressure Injury Interventions:             Activity Interventions: Increase time out of bed         Nutrition Interventions: Document food/fluid/supplement intake                     Problem: Patient Education: Go to Patient Education Activity  Goal: Patient/Family Education  2/10/2022 1749 by Gale Hill RN  Outcome: Resolved/Met  2/10/2022 1600 by Gale Hill RN  Outcome: Progressing Towards Goal     Problem: Patient Education: Go to Patient Education Activity  Goal: Patient/Family Education  2/10/2022 1749 by Gale Hill RN  Outcome: Resolved/Met  2/10/2022 1600 by Richmond Mullins, RN  Outcome: Progressing Towards Goal     Problem: Vaginal Hysterectomy: Day of Surgery  Goal: Off Pathway (Use only if patient is Off Pathway)  Outcome: Resolved/Met  Goal: Activity/Safety  2/10/2022 1749 by Richmond Mullins, RN  Outcome: Resolved/Met  2/10/2022 1600 by Richmond Mullins, RN  Outcome: Progressing Towards Goal  Goal: Consults, if ordered  2/10/2022 1749 by Richmond Mullins, RN  Outcome: Resolved/Met  2/10/2022 1600 by Richmond Mullins, RN  Outcome: Progressing Towards Goal  Goal: Diagnostic Test/Procedures  2/10/2022 1749 by Richmond Mullins, RN  Outcome: Resolved/Met  2/10/2022 1600 by Richmond Mullins, RN  Outcome: Progressing Towards Goal  Goal: Nutrition/Diet  Outcome: Resolved/Met  Goal: Discharge Planning  2/10/2022 1749 by Richmond Mullins, RN  Outcome: Resolved/Met  2/10/2022 1600 by Richmond Mullins, RN  Outcome: Progressing Towards Goal  Goal: Medications  2/10/2022 1749 by Richmond Mullins, RN  Outcome: Resolved/Met  2/10/2022 1600 by Richmond Mullins, RN  Outcome: Progressing Towards Goal  Goal: Respiratory  Outcome: Resolved/Met  Goal: Treatments/Interventions/Procedures  2/10/2022 1749 by Richmond Mullins, RN  Outcome: Resolved/Met  2/10/2022 1600 by Richmond Mullins, RN  Outcome: Progressing Towards Goal  Goal: Psychosocial  Outcome: Resolved/Met  Goal: *Hemodynamically stable  2/10/2022 1749 by Richmond Mullins, RN  Outcome: Resolved/Met  2/10/2022 1600 by Richmond Mullins, RN  Outcome: Progressing Towards Goal  Goal: *Optimal pain control at patient's stated goal  2/10/2022 1749 by Richmond Mullins, RN  Outcome: Resolved/Met  2/10/2022 1600 by Richmond Mullins, RN  Outcome: Progressing Towards Goal  Goal: *Absence of infection signs and symptoms  2/10/2022 1749 by Richmond Mullins, RN  Outcome: Resolved/Met  2/10/2022 1600 by Richmond Mullins, RN  Outcome: Progressing Towards Goal     Problem: Vaginal Hysterectomy: Post-Op Day 1/Day of Discharge  Goal: Off Pathway (Use only if patient is Off Pathway)  Outcome: Resolved/Met  Goal: Activity/Safety  2/10/2022 1749 by Juvenal Lowe RN  Outcome: Resolved/Met  2/10/2022 1600 by Juvenal Lowe RN  Outcome: Progressing Towards Goal  Goal: Consults, if ordered  Outcome: Resolved/Met  Goal: Diagnostic Test/Procedures  2/10/2022 1749 by Juvenal Lowe RN  Outcome: Resolved/Met  2/10/2022 1600 by Juvenal Lowe RN  Outcome: Progressing Towards Goal  Goal: Nutrition/Diet  2/10/2022 1749 by Juvenal Lowe RN  Outcome: Resolved/Met  2/10/2022 1600 by Juvenal Lowe RN  Outcome: Progressing Towards Goal  Goal: Discharge Planning  Outcome: Resolved/Met  Goal: Medications  2/10/2022 1749 by Juvenal Lowe RN  Outcome: Resolved/Met  2/10/2022 1600 by Juvenal Lowe RN  Outcome: Progressing Towards Goal  Goal: Respiratory  Outcome: Resolved/Met  Goal: Treatments/Interventions/Procedures  2/10/2022 1749 by Juvenal Lowe RN  Outcome: Resolved/Met  2/10/2022 1600 by Juvenal Lowe RN  Outcome: Progressing Towards Goal  Goal: Psychosocial  Outcome: Resolved/Met     Problem: Vaginal Hysterectomy: Discharge Outcomes  Goal: *Demonstrates progressive activity  Outcome: Resolved/Met  Goal: *Tolerating diet  Outcome: Resolved/Met  Goal: *Hemodynamically stable  2/10/2022 1749 by Juvenal Lowe RN  Outcome: Resolved/Met  2/10/2022 1600 by Juvenal Lowe RN  Outcome: Progressing Towards Goal  Goal: *Describes available resources and support systems  Outcome: Resolved/Met  Goal: *Optimal pain control at patient's stated goal  2/10/2022 1749 by Juvenal Lowe RN  Outcome: Resolved/Met  2/10/2022 1600 by Juvenal Lowe RN  Outcome: Progressing Towards Goal  Goal: *Verbalizes understanding and describes medication purposes and frequencies  Outcome: Resolved/Met  Goal: *Lungs clear or at baseline  Outcome: Resolved/Met  Goal: *Active bowel function  Outcome: Resolved/Met  Goal: *Voiding  Outcome: Resolved/Met  Goal: *Verbalizes and demonstrates incision care  Outcome: Resolved/Met  Goal: *Expresses feelings about diagnosis and surgery  Outcome: Resolved/Met  Goal: *Received and verbalizes understanding of discharge plan and instructions  Outcome: Resolved/Met     Problem: Pain  Goal: *Control of Pain  2/10/2022 1749 by Cheyanne Allen RN  Outcome: Resolved/Met  2/10/2022 1600 by Cheyanne Allen RN  Outcome: Progressing Towards Goal  Goal: *PALLIATIVE CARE:  Alleviation of Pain  Outcome: Resolved/Met     Problem: Patient Education: Go to Patient Education Activity  Goal: Patient/Family Education  Outcome: Resolved/Met

## 2022-02-10 NOTE — OP NOTES
Gynecologic Oncology Operative Report    Cherelle Bullard  2/10/2022    PREOPERATIVE DIAGNOSIS:  1) Abnormal uterine bleeding s/p failed medical management; 2) Breast cancer; 3) Morbid obesity, BMI 50    POSTOPERATIVE DIAGNOSIS:  1) Abnormal uterine bleeding s/p failed medical management; 2) Breast cancer; 3) Morbid obesity, BMI 50    PROCEDURE:    Total laparoscopic hysterectomy with bilateral salpingo-oophorectomy    Surgeon:  Gladys Berg MD    Assistant:  Byron Ellington surgical assistance was required throughout the case due to the complexity of the procedure. He assisted with dissection, development of the retroperitoneal spaces, and identification of pertinent anatomy during the procedure. Anesthesia:  General endotracheal anesthesia    EBL:  <10 cc     Preoperative antibiotics: Cefotetan 2grams     VTE Prophylaxis: SCDs     Complications:  None    Implants: none    Specimens:  Uterus, cervix, bilateral fallopian tubes and ovaries    Operative indications:  49 y.o. female with AUB and history of breast cancer, who continues to have bleeding s/p failed medical management. Operative findings: On laparoscopic survey, gastric band in upper abdomen with adhesions of the omentum to the anterior abdominal wall. Uterus was 12 weeks size. Normal appearing bilateral adnexa. Some small endometriotic implants along the left pelvic side-wall. Normal appearing rectosigmoid colon, small bowel, liver edge, and diaphragm. Given the patient's obesity, a 22-modifier will be added for increased difficulty. Operative note:  After the risks, benefits, indications, and alternatives of the procedure were discussed with the patient and informed consent was obtained, the patient was taken to the operating room. She was positively identified, administered general anesthesia, and then placed in the dorsal lithotomy position in [address] [place] [address]. An exam under anesthesia was performed with the above findings.   She was then prepped and draped in the usual fashion. A nowak catheter was inserted. A speculum was placed in the vagina and the anterior lip of the cervix was grasped with a tenaculum and then dilated. The cervix was injected with 3-cc of indigocynanine green at 3- and 9-o'clock. Then a V-care uterine manipulator was placed without difficulty. A 44BN umbilical incision was then made with #15-blade and carried down to the underlying fascia. The fascia was incised and the peritoneal cavity entered via an open Sammi technique. 10-mm balloon trocar was then placed and intra-peritoneal placement confirmed via direct visualization. The abdomen was then insufflated with CO2 to a pressure of 15mmHg. The abdomen was surveyed with the above findings. Bilateral right and left lower quadrant 5-mm trocars were then inserted under direct visualization. The patient was placed in Trendelenburg and the bowel displaced superiorly. Attention was then turned to the pelvis. Bilateral round ligaments were sealed and divided with the EnSeal device and the bilateral pelvic side-wall retroperitoneum entered and developed. Bilateral ureters were identified and preserved. Bilateral infundibulopelvic ligaments were skeletonized, then sealed and divided with the EnSeal device. Dissection was continued inferiorly along the broad ligament and the bladder dissected off the lower uterine segment and cervix. Bilateral uterine arteries were skeletonized, then sealed and divided with the EnSeal device. A circumferential colpotomy was then carried along the V-care. The specimen was then placed in a 15-mm EndoCatch bag and removed via the vagina via morcellation completely contained within the specimen bag. The vaginal colpotomy was closed using the EndoStitch with 0-Vicryl V-lock suture. The pelvis was irrigated and made hemostatic. The intra-abdominal pressure was then decreased and all planes of dissection were confirmed hemostatic. Fibrillar was placed along all planes of dissection. The insufflation was released prior to removal of all port sites, then all trocars were removed. The umbilical fascia was reapproximated with a figure-of-8 stitch using 0-Vicryl on a UR-6 needle. All skin incisions were closed with 4-0 monocryl subcuticular stitch. Skin glue was applied to all skin incisions. The vagina was inspected with an intact vaginal cuff and no evidence of lacerations. The patient was taken out of [facility], awakened from anesthesia, and taken to the recovery room in stable condition. The patient tolerated the procedure well. All instrument, sponge, and needle counts were correct.         Anabela Rodriguez MD  2/10/2022  3:49 PM

## 2022-02-10 NOTE — BRIEF OP NOTE
Brief Postoperative Note    Patient: Cris Smith  YOB: 1970  MRN: [id number]    Date of Procedure: 2/10/2022     Pre-Op Diagnosis: BREAST CANCER; ABNORMAL UTERINE BLEEDIGN    Post-Op Diagnosis: Same as preoperative diagnosis. Procedure(s):  TOTAL LAPAROSCOPIC HYSTERECTOMY , BILATERAL SALPINGO OOPHORECTOMY    Surgeon(s): Elbert Bliss MD    Surgical Assistant: Physician Assistant: Chasidy Hernandez PA-C    Anesthesia: General     Estimated Blood Loss (mL): Minimal    Complications: None    Specimens:   ID Type Source Tests Collected by Time Destination   1 : UTERUS, CERVIX, BILATERAL FALLOPIAN TUBES, AND BILATERAL OVARIES Fresh Uterus with Bilateral Fallopian tubes and Ovaries  Elbert Bliss MD 2/10/2022 1217 Pathology        Implants: * No implants in log *    Drains: * No LDAs found *    Findings: On laparoscopic survey, gastric band in upper abdomen with adhesions of the omentum to the anterior abdominal wall. Uterus was 12 weeks size. Normal appearing bilateral adnexa. Some small endometriotic implants along the left pelvic side-wall. Normal appearing rectosigmoid colon, small bowel, liver edge, and diaphragm.      Electronically Signed by Rick Crawford MD on 2/10/2022 at 3:48 PM

## 2022-02-10 NOTE — ANESTHESIA PREPROCEDURE EVALUATION
Relevant Problems   No relevant active problems       Anesthetic History   No history of anesthetic complications  PONV          Review of Systems / Medical History  Patient summary reviewed, nursing notes reviewed and pertinent labs reviewed    Pulmonary  Within defined limits          Asthma        Neuro/Psych   Within defined limits           Cardiovascular  Within defined limits  Hypertension        Dysrhythmias       Exercise tolerance: >4 METS     GI/Hepatic/Renal  Within defined limits   GERD      PUD     Endo/Other  Within defined limits      Morbid obesity, arthritis, cancer and anemia     Other Findings              Physical Exam    Airway  Mallampati: II  TM Distance: > 6 cm  Neck ROM: normal range of motion   Mouth opening: Normal     Cardiovascular  Regular rate and rhythm,  S1 and S2 normal,  no murmur, click, rub, or gallop             Dental  No notable dental hx       Pulmonary  Breath sounds clear to auscultation               Abdominal  GI exam deferred       Other Findings            Anesthetic Plan    ASA: 3  Anesthesia type: general          Induction: Intravenous  Anesthetic plan and risks discussed with: Patient

## 2022-02-10 NOTE — INTERVAL H&P NOTE
Date of Surgery Update:  Isaura Ruiz was seen and examined. History and physical has been reviewed. The patient has been examined.  There have been no significant clinical changes since the completion of the originally dated History and Physical.    Signed By: Sugey Monreal MD     February 10, 2022 10:26 AM

## 2022-02-10 NOTE — PROGRESS NOTES
TRANSFER - IN REPORT:    Verbal report received from NABIL Dubois(name) on Shimon Marie  being received from [employer]) for routine progression of care      Report consisted of patients Situation, Background, Assessment and   Recommendations(SBAR). Information from the following report(s) SBAR, Kardex, Procedure Summary and MAR was reviewed with the receiving nurse. Opportunity for questions and clarification was provided. Assessment completed upon patients arrival to unit and care assumed. 1728 PM Instilled 250mL of sterile water into her nowak and voided 200mL.

## 2022-02-10 NOTE — PERIOP NOTES
TRANSFER - OUT REPORT:    Verbal report given to Rachel FERRER on Chasity Cho  being transferred to room 309 for routine post - op       Report consisted of patients Situation, Background, Assessment and   Recommendations(SBAR). Time Pre op antibiotic given:2 gram cefoxitin  Anesthesia Stop time: 8629  Davalos Present on Transfer to floor:yes  Order for Davalos on Chart:yes  Discharge Prescriptions with Chart:    Information from the following report(s) SBAR, OR Summary, Intake/Output and MAR was reviewed with the receiving nurse. Opportunity for questions and clarification was provided. Is the patient on 02? YES       L/Min 2       Other     Is the patient on a monitor? NO    Is the nurse transporting with the patient? NO    Surgical Waiting Area notified of patient's transfer from PACU?  NO, patient states family not here      The following personal items collected during your admission accompanied patient upon transfer:   Dental Appliance: Dental Appliances: None  Vision:    Hearing Aid:    Jewelry:    Clothing: Clothing: Other (comment) (clothing)  Other Valuables:    Valuables sent to safe: Personal Items Sent to Safe: cell phone

## 2022-02-10 NOTE — PROGRESS NOTES
Problem: Falls - Risk of  Goal: *Absence of Falls  Description: Document Elodia Boston Fall Risk and appropriate interventions in the flowsheet. Outcome: Progressing Towards Goal  Note: Fall Risk Interventions:  Mobility Interventions: Patient to call before getting OOB         Medication Interventions: Patient to call before getting OOB                   Problem: Patient Education: Go to Patient Education Activity  Goal: Patient/Family Education  Outcome: Progressing Towards Goal     Problem: Pressure Injury - Risk of  Goal: *Prevention of pressure injury  Description: Document Vince Scale and appropriate interventions in the flowsheet. Outcome: Progressing Towards Goal  Note: Pressure Injury Interventions:             Activity Interventions: Increase time out of bed         Nutrition Interventions: Document food/fluid/supplement intake                     Problem: Patient Education: Go to Patient Education Activity  Goal: Patient/Family Education  Outcome: Progressing Towards Goal     Problem: Patient Education: Go to Patient Education Activity  Goal: Patient/Family Education  Outcome: Progressing Towards Goal     Problem: Vaginal Hysterectomy: Day of Surgery  Goal: Activity/Safety  Outcome: Progressing Towards Goal  Goal: Consults, if ordered  Outcome: Progressing Towards Goal  Goal: Diagnostic Test/Procedures  Outcome: Progressing Towards Goal  Goal: Discharge Planning  Outcome: Progressing Towards Goal  Goal: Medications  Outcome: Progressing Towards Goal  Goal: Treatments/Interventions/Procedures  Outcome: Progressing Towards Goal  Goal: *Hemodynamically stable  Outcome: Progressing Towards Goal  Goal: *Optimal pain control at patient's stated goal  Outcome: Progressing Towards Goal  Goal: *Absence of infection signs and symptoms  Outcome: Progressing Towards Goal     Problem: Vaginal Hysterectomy: Post-Op Day 1/Day of Discharge  Goal: Activity/Safety  Outcome: Progressing Towards Goal  Goal: Diagnostic Test/Procedures  Outcome: Progressing Towards Goal  Goal: Nutrition/Diet  Outcome: Progressing Towards Goal  Goal: Medications  Outcome: Progressing Towards Goal  Goal: Treatments/Interventions/Procedures  Outcome: Progressing Towards Goal     Problem: Vaginal Hysterectomy: Discharge Outcomes  Goal: *Hemodynamically stable  Outcome: Progressing Towards Goal  Goal: *Optimal pain control at patient's stated goal  Outcome: Progressing Towards Goal     Problem: Pain  Goal: *Control of Pain  Outcome: Progressing Towards Goal

## 2022-02-10 NOTE — DISCHARGE INSTRUCTIONS
[address] [facility] [address]  P [phone]  F [phone]     Neeraj Nelson      Dear Ms. Mario Rothman,      Please review your instructions with your nurse and ask any questions so you have all the information you need to recover well at home. If you do not feel you have everything you need to succeed and be safe after you leave the hospital, please discuss these concerns with your nurse. As always, call for any questions at home. Your doctor: Dr. Michelle Flower  Diagnosis: BREAST CANCER  Procedure: TOTAL LAPAROSCOPIC HYSTERECTOMY , BILATERAL SALPINGO OOPHORECTOMY  Date of Discharge: 2/11/22      Take Home Medications     See Discharge Medication Review provided to you by your nurse. If you did not receive one, request this prior to your discharge. · It is important that you take your medications as they are prescribed. Your prescriptions are sent to your pharmacy on record. · Keep your medications in the bottles provided by the pharmacist and keep a list of the medication names, dosages, times to be taken and what they are for in your wallet. · Do not take other medications without consulting your doctor. · You may take acetaminophen (Tylenol) and ibuprofen (Advil) for pain. If this is not sufficient for your pain, take tramadol or other pain medication you were provided. · You should take a daily gentle stool softener such as a colace pill or dulcolax suppository for constipation as this is not uncommon after surgery and/or while on pain medication. If not prescribed, this can be found over the counter. If constipation persists for >24 hours you should take a dose of Milk of Magnesia. Call if your constipation continues. Diet    · Stay hydrated and drink fluids such as gatorade and water. This will also help prevent constipation and dehydration.  Limit somewhat any usual caffeine intake of beverages such as soda, tea and coffee as this may serve to dehydrate you. · For the first 2-3 days keep a low fiber diet avoiding raw vegetables or fruits with skin. A diet consisting of soup, cereal, yogurt, eggs, fish, Boost/Ensure. Avoid fatty/greasy foods. · If nauseated, keep your diet limited to liquids and call if this persists. Activity    · If possible, have someone with you at all times until you feel stable. · Gradually increase your activity each day. There are generally no restrictions on walking, climbing stairs or riding in a car. Walk at least 4 times per day. Walking will help reduce the risk of blood clots and constipation. · Showers are okay. If you have an incision, no tub bathing/swimming for two weeks. · No driving for 2 week and/or while on pain medication. · The following restrictions apply:  1. No heavy lifting greater than 15 lbs for 4 weeks, then 25 lbs for the next 4 weeks. 2. If you had any vaginal procedure or a hysterectomy, nothing per vagina for 6-8 weeks. 3. You may experience vaginal spotting. Should the bleeding require more that 4 pads a day please call our office. Incision    · You should expect some discomfort in the area of your incision, particularly as you increase your activity. If you notice an area of increasing redness or new drainage, please call your doctor. · Your incisions will have buried, absorbable sutures, which do not need to be removed and are covered by protective glue. Please allow this to fall off on its own over time and refrain from peeling it off unless it is no longer covering the incision. Causes For Concern    If any of the following occur, please call our office and speak with the Nurse/aid who will help you with your problem or ask the doctor to call you.  Problems with the incision, including increasing pain, swelling, redness or drainage.     Inability to pass urine    Increasing abdominal pain despite medication   Persisting nausea or vomiting.  Fever or chills and a temperature >101F   Constipation (no bowel movement for three days).  Diarrhea (more than three watery stools within 24 hours).  Excessive vaginal or wound bleeding.  If after hours and you cannot reach an on-call physician, call 911. Follow-Up  Call [phone] to schedule the following appointments with Dr. Jayla Lira:   Telephone virtual-visit in 10-14 days to discuss your pathology results.  In-office visit for your postoperative exam in 6 weeks from surgery. Information obtained by :  I understand that if any problems occur once I am at home I am to contact my physician. I understand and acknowledge receipt of the instructions indicated above.                                                                                                                                            Physician's or R.N.'s Signature                                                                  Date/Time                                                                                                                                              Patient or Representative Signature                                                          Date/Time

## 2022-02-11 DIAGNOSIS — N93.9 VAGINAL BLEEDING: ICD-10-CM

## 2022-02-14 ENCOUNTER — TELEPHONE (OUTPATIENT)
Dept: GYNECOLOGY | Age: 52
End: 2022-02-14

## 2022-02-14 DIAGNOSIS — R30.9 PAIN WITH URINATION: ICD-10-CM

## 2022-02-14 DIAGNOSIS — R30.0 DYSURIA: Primary | ICD-10-CM

## 2022-02-14 RX ORDER — MEGESTROL ACETATE 20 MG/1
TABLET ORAL
Qty: 30 TABLET | Refills: 0 | Status: SHIPPED | OUTPATIENT
Start: 2022-02-14 | End: 2022-10-05 | Stop reason: ALTCHOICE

## 2022-02-14 NOTE — TELEPHONE ENCOUNTER
Pt calling with severe pain with urination, going to send pt to lab yola at the [facility] to give a urine sample, will send lab yola order over, pt will go today

## 2022-02-14 NOTE — ANESTHESIA POSTPROCEDURE EVALUATION
Procedure(s):  TOTAL LAPAROSCOPIC HYSTERECTOMY , BILATERAL SALPINGO OOPHORECTOMY. general    Anesthesia Post Evaluation        Patient location during evaluation: PACU  Note status: Adequate. Level of consciousness: responsive to verbal stimuli and sleepy but conscious  Pain management: satisfactory to patient  Airway patency: patent  Anesthetic complications: no  Cardiovascular status: acceptable  Respiratory status: acceptable  Hydration status: acceptable  Comments: +Post-Anesthesia Evaluation and Assessment    Patient: Gracia Macedo MRN: [id number]  SSN: [id number]   YOB: 1970  Age: 49 y.o. Sex: female          Cardiovascular Function/Vital Signs    /79 (BP 1 Location: Right upper arm, BP Patient Position: At rest)   Pulse 80   Temp 37.1 °C (98.7 °F)   Resp 16   Ht 5' 9\" (1.753 m)   Wt 153.8 kg (339 lb 1.1 oz)   SpO2 98%   BMI 50.07 kg/m²     Patient is status post Procedure(s):  TOTAL LAPAROSCOPIC HYSTERECTOMY , BILATERAL SALPINGO OOPHORECTOMY. Nausea/Vomiting: Controlled. Postoperative hydration reviewed and adequate. Pain:  Pain Scale 1: Visual (02/10/22 1548)  Pain Intensity 1: 0 (02/10/22 1548)   Managed. Neurological Status:   Neuro (WDL): Exceptions to WDL (drowsy) (02/10/22 1345)   At baseline. Mental Status and Level of Consciousness: Arousable. Pulmonary Status:   O2 Device: None (Room air) (02/10/22 1448)   Adequate oxygenation and airway patent. Complications related to anesthesia: None    Post-anesthesia assessment completed. No concerns. I have evaluated the patient and the patient is stable and ready to be discharged from PACU .     Signed By: Angely Redd MD    2/14/2022        INITIAL Post-op Vital signs:   Vitals Value Taken Time   /79 02/10/22 1430   Temp 36.8 °C (98.3 °F) 02/10/22 1345   Pulse 82 02/10/22 1430   Resp 23 02/10/22 1430   SpO2 100 % 02/10/22 1430

## 2022-02-17 DIAGNOSIS — N39.0 URINARY TRACT INFECTION WITHOUT HEMATURIA, SITE UNSPECIFIED: Primary | ICD-10-CM

## 2022-02-17 LAB
APPEARANCE UR: CLEAR
BACTERIA #/AREA URNS HPF: NORMAL /[HPF]
BACTERIA UR CULT: ABNORMAL
BACTERIA UR CULT: ABNORMAL
BILIRUB UR QL STRIP: NEGATIVE
CASTS URNS QL MICRO: NORMAL /LPF
COLOR UR: YELLOW
EPI CELLS #/AREA URNS HPF: NORMAL /HPF (ref 0–10)
GLUCOSE UR QL STRIP: NEGATIVE
HGB UR QL STRIP: NEGATIVE
KETONES UR QL STRIP: NEGATIVE
LEUKOCYTE ESTERASE UR QL STRIP: NEGATIVE
MICRO URNS: ABNORMAL
NITRITE UR QL STRIP: NEGATIVE
PH UR STRIP: 7 [PH] (ref 5–7.5)
PROT UR QL STRIP: ABNORMAL
RBC #/AREA URNS HPF: NORMAL /HPF (ref 0–2)
SP GR UR STRIP: 1.02 (ref 1–1.03)
UROBILINOGEN UR STRIP-MCNC: 1 MG/DL (ref 0.2–1)
WBC #/AREA URNS HPF: NORMAL /HPF (ref 0–5)

## 2022-02-17 RX ORDER — NITROFURANTOIN (MACROCRYSTALS) 100 MG/1
100 CAPSULE ORAL 2 TIMES DAILY
Qty: 14 CAPSULE | Refills: 0 | Status: SHIPPED | OUTPATIENT
Start: 2022-02-17 | End: 2022-02-24

## 2022-02-17 NOTE — TELEPHONE ENCOUNTER
I called and spoke with patient to advise she has a UTI and will send abx to pharmacy Pt has not done labs.     Received request via: Pharmacy    Was the patient seen in the last year in this department? Yes LOV 01/06/2021    Does the patient have an active prescription (recently filled or refills available) for medication(s) requested? No

## 2022-02-17 NOTE — TELEPHONE ENCOUNTER
[facility]     Ms. Ernesto Moore has a UTI. Can you send nitrofurantoin 100mg bid for 7 days.      Thanks,    Margareth Perez MD

## 2022-02-21 ENCOUNTER — TELEPHONE (OUTPATIENT)
Dept: GYNECOLOGY | Age: 52
End: 2022-02-21

## 2022-02-21 DIAGNOSIS — R30.0 DYSURIA: Primary | ICD-10-CM

## 2022-02-21 DIAGNOSIS — N30.00 ACUTE CYSTITIS WITHOUT HEMATURIA: ICD-10-CM

## 2022-02-22 RX ORDER — PHENAZOPYRIDINE HYDROCHLORIDE 100 MG/1
100 TABLET, FILM COATED ORAL 3 TIMES DAILY
Qty: 9 TABLET | Refills: 0 | Status: SHIPPED | OUTPATIENT
Start: 2022-02-22 | End: 2022-02-25

## 2022-02-22 RX ORDER — ATROPA BELLADONNA AND OPIUM 16.2; 3 MG/1; MG/1
1 SUPPOSITORY RECTAL
Qty: 9 SUPPOSITORY | Refills: 0 | Status: SHIPPED | OUTPATIENT
Start: 2022-02-22 | End: 2022-02-25

## 2022-02-22 NOTE — TELEPHONE ENCOUNTER
Pt states a prescription for pyridium was sent to her pharmacy due to she is still having pain with her UTI. She states her sister had the pyridium 200 mg and she took three of those with no relief. She states she is having rectal and bladder spasms. She states patient first called her on Saturday and states their culture showed two different bacterias. They told her to stop taking the nitrofurantoin we gave her and they called her in Cipro. She only took 2 1/2 days of the nitrofurantoin.

## 2022-02-22 NOTE — TELEPHONE ENCOUNTER
Called to d/w patient. On abx, continues with ureteral/rectal spasms with and w/o voids/BMs. No F/C. No bloody voids. Tried non- pyridium from relative. B&O suppository called in. If continues later this week despite abx and pain relief, to recheck UA. Patient agrees with the plan.     Juvenal Marie PA-C

## 2022-03-18 PROBLEM — N95.0 POSTMENOPAUSAL BLEEDING: Status: ACTIVE | Noted: 2022-02-10

## 2022-03-20 PROBLEM — N95.0 PMB (POSTMENOPAUSAL BLEEDING): Status: ACTIVE | Noted: 2021-10-26

## 2022-03-21 NOTE — PROGRESS NOTES
Post op Visit, surgery was on 2/10/22    1. Have you been to the ER, urgent care clinic since your last visit? Hospitalized since your last visit? Yes, surgery with Dr. Rand Abbasi on 2/10/22    2. Have you seen or consulted any other health care providers outside of the [address] [place] [address] since your last visit? Include any pap smears or colon screening.    no

## 2022-03-22 ENCOUNTER — OFFICE VISIT (OUTPATIENT)
Dept: GYNECOLOGY | Age: 52
End: 2022-03-22
Payer: COMMERCIAL

## 2022-03-22 VITALS
BODY MASS INDEX: 43.4 KG/M2 | SYSTOLIC BLOOD PRESSURE: 153 MMHG | WEIGHT: 293 LBS | DIASTOLIC BLOOD PRESSURE: 100 MMHG | HEIGHT: 69 IN | HEART RATE: 92 BPM

## 2022-03-22 DIAGNOSIS — Z17.1 MALIGNANT NEOPLASM OF LEFT BREAST IN FEMALE, ESTROGEN RECEPTOR NEGATIVE, UNSPECIFIED SITE OF BREAST (HCC): ICD-10-CM

## 2022-03-22 DIAGNOSIS — E66.01 MORBID OBESITY (HCC): ICD-10-CM

## 2022-03-22 DIAGNOSIS — N95.0 PMB (POSTMENOPAUSAL BLEEDING): Primary | ICD-10-CM

## 2022-03-22 DIAGNOSIS — C50.912 MALIGNANT NEOPLASM OF LEFT BREAST IN FEMALE, ESTROGEN RECEPTOR NEGATIVE, UNSPECIFIED SITE OF BREAST (HCC): ICD-10-CM

## 2022-03-22 PROCEDURE — 99024 POSTOP FOLLOW-UP VISIT: CPT | Performed by: OBSTETRICS & GYNECOLOGY

## 2022-03-22 NOTE — PROGRESS NOTES
[address] [place] [address] [address], [address] [place] [address]  P [phone]  F [phone]    Office Note  Patient ID:  Name:  Mushtaq Blackburn  MRN:  [id number]  :  1970/51 y.o. Date:  3/22/2022      HISTORY OF PRESENT ILLNESS:  Ms. Mushtaq Blackburn is a 49 y.o. female with persistent PMB with a history of breast cancer. Underwent a hysteroscopy/D&C with Dr. Veta Boeck, Gynecologic Oncology in [place] in 2020. Her pathology was consistent with \"focally disordered proliferative endometrium. Benign endocervical mucosa. \"     On 2021 underwent Pelvic exam under anesthesia, cervical dilation, hysteroscopy, MyoSure curetting/polypectomy, Mirena IUD placement. Final pathology consistent with benign proliferative endometrium. On 2/10/2022 underwent Total laparoscopic hysterectomy with bilateral salpingo-oophorectomy. Final pathology benign. Presents today for postoperative visit. Doing well without complaints. Initial History:  Ms. Mushtaq Blackburn is a 49 y.o.  postmenopausal female who presents as a new patient from Dr. Rajiv Carl for vaginal bleeding/spotting. The patient has a history of triple negative breast cancer diagnosed in 2016 s/p lumpectomy/AC-T chemo and radiation. The patient reports menopause a few years ago while undergoing treatment for her breast cancer. She presented last year with vaginal spotting/bleeding and underwent a hysteroscopy/D&C with Dr. Veta Boeck, Gynecologic Oncology in [place]. Her pathology was consistent with \"focally disordered proliferative endometrium. Benign endocervical mucosa. \"     She is now followed by Dr. Rajiv Carl for her breast cancer. She is referred to our office for continued vaginal spotting/bleeding. Interval History:  Presents back today for ultrasound results. Pertinent PMH/PSH: breast cancer, HTN, obesity, IBS      Active, no restrictions.      Imaging Review:  Pelvic ultrasound 11/3/2021:  FINDINGS:    TRANSABDOMINAL:  The uterus measures 10.2 x 5.3 x 8.3 cm. The endometrial stripe is thickened  measuring 10 mm. The uterus otherwise appears normal.  The bilateral ovaries are not well seen due to overlying bowel gas. TRANSVAGINAL:  The uterus measures 8.7 x 5.5 x 6.9 cm. The endometrial stripe is thickened  measuring 12 mm. The uterus otherwise appears normal and anteverted. The bilateral ovaries are not well seen due to overlying bowel gas. No free fluid in the pelvis. IMPRESSION  1. Thickened endometrial stripe in a postmenopausal patient, with differential  considerations of endometrial hyperplasia, carcinoma, and polyp. Recommend  endometrial biopsy. Pathology Review:   2/10/2022:  * * *FINAL PATHOLOGIC DIAGNOSIS* * *          Uterus, cervix, ovaries, and fallopian tubes, hysterectomy and   bilateral salpingo-oophorectomy:        Corpus        Benign endometrium with exogenous hormone effect        Extensive adenomyosis        Leiomyomata with hyalinization   Cervix        Benign cervix with nabothian cysts   Ovaries        Bilateral benign ovaries with stromal hyperplasia and atrophic   changes   Fallopian tubes        Bilateral benign fallopian tubes     11/2/2021:  A. ECC: benign endocervical mucosa with chronic inflammation  B. Endometrium: focally disordered proliferative endometrium. Benign endocervical mucosa. ROS:  A comprehensive review of systems was negative except for that written in the History of Present Illness.  , 10 point ROS      ECOG rdGrdrrdarddrderd:rd rd3rd Problem List:  Patient Active Problem List    Diagnosis Date Noted    Postmenopausal bleeding 02/10/2022    PMB (postmenopausal bleeding) 10/26/2021    Thalassemia     Hypertension     IBS (irritable bowel syndrome)     Esophageal spasm     Morbid obesity ([address]     Diverticulosis     Diverticulitis     Fibromyalgia     Osteoarthritis     Breast [facility]      PMH:  Past Medical History:   Diagnosis Date    Arrhythmia     MURMUR    Asthma     AS A CHILD    Breast cancer ([facility] [address] 07/21/2016    LEFT    Diverticulitis     Diverticulosis     Esophageal spasm     Fibromyalgia     GERD (gastroesophageal reflux disease)     Heart murmur     History of chemotherapy 01/11/1977    Completed    Hypertension     IBS (irritable bowel syndrome)     Ill-defined condition     LYMPHEDEMA LEFT ARM    Lymphedema     LEFT ARM    Morbid obesity ([facility] [address] [facility]     Nausea & vomiting     Osteoarthritis     PUD (peptic ulcer disease)     YEARS AGO    S/P radiation therapy 04/12/2017    Completed    Thalassemia       PSH:  Past Surgical History:   Procedure Laterality Date    HX BARIATRIC SURGERY  10/2010    lap band    HX BREAST LUMPECTOMY Left 07/2016    CHEMO AND RADIATION    HX COLONOSCOPY  2017    HX DILATION AND CURETTAGE  11/2020, 2021    HX GASTRIC BYPASS  10/2010    LAP BAND    HX KNEE ARTHROSCOPY Bilateral 2008    HX ORTHOPAEDIC  10/2014    right foot and ankle reconstruction for flat feet    HX ORTHOPAEDIC Left 2006    ACHILLES TENDON REPAIR    HX TONSILLECTOMY        Social History:  Social History     Tobacco Use    Smoking status: Never Smoker    Smokeless tobacco: Never Used   Substance Use Topics    Alcohol use: Not Currently      Family History:  Family History   Problem Relation Age of Onset    OSTEOARTHRITIS Mother     Thalassemia Mother     Hypertension Mother     Heart Disease Father         chf    Cancer Father         lung    Hypertension Father     Arthritis Father     Heart Failure Father    Iovianus.Cassis Arthritis-rheumatoid Brother     Hypertension Brother     Hypertension Brother     Elevated Lipids Brother     OSTEOARTHRITIS Brother     OSTEOARTHRITIS Sister     Other Brother         SARCOIDOSIS    Anesth Problems Neg Hx       Medications: (reviewed)  Current Outpatient Medications   Medication Sig    ondansetron (ZOFRAN ODT) 4 mg disintegrating tablet Take 1 Tablet by mouth every eight (8) hours as needed for Nausea or Vomiting.  dilTIAZem ER (CARDIZEM CD) 180 mg capsule Take 1 Capsule by mouth daily.  polyethylene glycol (Miralax) 17 gram packet as needed.  aspirin-acetaminophen-caffeine (Excedrin Extra Strength) 250-250-65 mg per tablet 2 Tablets two (2) times a day.  wheat dextrin/calcium/aspartam (BENEFIBER + CALCIUM SUGAR-FREE PO) daily.  propylene glycoL (Systane Balance) 0.6 % drop Apply 1 Drop to eye two (2) times daily as needed.  biotin 10,000 mcg cap Take 1 Tablet by mouth daily.  cholecalciferol (VITAMIN D3) (5000 Units/125 mcg) tab tablet Take 5,000 Units by mouth daily.  clotrimazole-betamethasone (LOTRISONE) topical cream Apply  to affected area as needed.  cyanocobalamin 1,000 mcg tablet Take 100 mcg by mouth daily.  DULoxetine (CYMBALTA) 60 mg capsule duloxetine 60 mg capsule,delayed release   Take 1 capsule every day by oral route.  folic acid 195 mcg tablet Take 400 mcg by mouth daily.  loratadine (CLARITIN) 10 mg tablet Take 10 mg by mouth daily.  losartan-hydroCHLOROthiazide (HYZAAR) 100-12.5 mg per tablet losartan 100 mg-hydrochlorothiazide 12.5 mg tablet   Take 1 tablet every day by oral route.  multivitamin capsule Take 1 Capsule by mouth daily.  megestroL (MEGACE) 20 mg tablet TAKE 1 TABLET BY MOUTH EVERY DAY (Patient not taking: Reported on 3/22/2022)     No current facility-administered medications for this visit. Allergies: (reviewed)  Allergies   Allergen Reactions    Other Food Sneezing    Adhesive Other (comments)     Tears skin off. Severe.       Oxycodone Itching    Lisinopril Other (comments)     \"constant tickle in my throat\"  \"constant tickle in my throat\"      Mold Other (comments)    Nickel Other (comments)    Sulfa (Sulfonamide Antibiotics) Other (comments)    Sulfamethoxazole-Trimethoprim Hives    Anesthetics - Amide Type - Select Amino Amides Nausea Only        OBJECTIVE:  Physical Exam:  Visit Vitals  BP (!) 153/100 (BP 1 Location: Right arm, BP Patient Position: Sitting)   Pulse 92   Ht 5' 9\" (1.753 m)   Wt 337 lb (152.9 kg)   LMP 11/30/2021 (Approximate)   BMI 49.77 kg/m²      General: Alert and oriented. No acute distress. Well-nourished    Gastrointestinal: soft, non-tender, non-distended, no masses or organomegaly. Well-healed incision. Musculoskeletal: normal gait. No joint tenderness, deformity or swelling. No muscular tenderness. Extremities: extremities normal, atraumatic, no cyanosis or edema. Pelvic: exam chaperoned by nurse. Normal appearing external genitalia. On speculum exam, the vaginal cuff is intact and healing well. On bimanual, the uterus and cervix are surgically absent. Vaginal cuff intact without defect. No evidence of masses or nodularity on bimanual exam. Deferred rectovaginal exam.   Neuro: Grossly intact. Normal gait and movement. No acute deficit  Skin: No evidence of rashes or skin changes. IMPRESSION/PLAN:    Ms. Rolando Perez is a 49 y.o. female with a working diagnosis of PMB with a history of breast cancer. Underwent a hysteroscopy/D&C with Dr. Tiffany Chapman, Gynecologic Oncology in [place] in 11/2020. Her pathology was consistent with \"focally disordered proliferative endometrium. Benign endocervical mucosa. \"     On 11/21/2021 underwent Pelvic exam under anesthesia, cervical dilation, hysteroscopy, MyoSure curetting/polypectomy, Mirena IUD placement. Final pathology consistent with benign proliferative endometrium. On 2/10/2022 underwent Total laparoscopic hysterectomy with bilateral salpingo-oophorectomy. Final pathology benign.      Problems:     Patient Active Problem List    Diagnosis Date Noted    Postmenopausal bleeding 02/10/2022    PMB (postmenopausal bleeding) 10/26/2021    Thalassemia     Hypertension     IBS (irritable bowel syndrome)     Esophageal spasm     Morbid obesity ([facility] [address]     Diverticulosis     Diverticulitis     Fibromyalgia     Osteoarthritis     Breast cancer [facility])        Reviewed patient's course to date, including her benign surgical pathology. She has healed well from surgery. Given her benign pathology, the patient may be discharged from Gynecologic Oncology clinic. Encouraged patient to follow-up with her PCP and ObGyn for continued routine health care maintenance. All questions and concerns were addressed with the patient and she is comfortable with the plan. An electronic signature was used to authenticate this note.      Bjorn Werner MD

## 2022-04-01 ENCOUNTER — PATIENT MESSAGE (OUTPATIENT)
Dept: INTERNAL MEDICINE CLINIC | Age: 52
End: 2022-04-01

## 2022-04-04 RX ORDER — DULOXETIN HYDROCHLORIDE 60 MG/1
CAPSULE, DELAYED RELEASE ORAL
Qty: 90 CAPSULE | Refills: 1 | Status: SHIPPED | OUTPATIENT
Start: 2022-04-04 | End: 2022-10-04

## 2022-04-04 RX ORDER — LOSARTAN POTASSIUM AND HYDROCHLOROTHIAZIDE 12.5; 1 MG/1; MG/1
TABLET ORAL
Qty: 90 TABLET | Refills: 1 | Status: SHIPPED | OUTPATIENT
Start: 2022-04-04 | End: 2022-10-04

## 2022-04-11 NOTE — PROGRESS NOTES
Lymphedema Physical Therapy Discharge Summary documented 2022    Patient was seen in the lymphedema program for visits 6 visits with last attended visit on 2022. She no showed for a scheduled visit on 2022 and has missed a total of 5 scheduled appointments. Her plan of care  on 2022 and she will be discharged from therapy at this time. Goal attainment as of patient's last treatment visit 2022 was as follows:    Short term goals  Time frame:  2021  1.  Patient will demonstrate knowledge of signs/symptoms of infections/cellulitis and be independent in skin care to prevent cellulitis. Goal Met 2021  2.  Patient will demonstrate independence in lymphedema home program of therapeutic exercises to improve circulation and decongest limb to improve ADLs. Progressing towards goal. Will extend to 2021  3.  Lower extremity limb volumes will be assessed to monitor for changes over time. Goal met 2021 with baseline measurement assessment today. 4.  Patient will be measured for appropriate upper and lower extremity compression garments to prevent worsening of swelling over time. Goal Met 2021     Long term goals  Time frame: 2021  1.  Pt will show improvement in the Lymphedema Life Impact Scale (LLIS) by decreasing the score to 12% and thus allow improvement in patient's quality of life. Progressing towards goal.   2.  Patient will be independent with don/doff of compression system and use in order to prevent reaccumulation of fluid at discharge. Progressing towards goal. Independent with current products. 3.  Pt will be independent in self-MLD and show stable limb volumes showing decongestion and pt. ready for transition to independent restorative phase of lymphedema therapy. Progressing towards goals.      Jni Farias, MSPT, CLT-YAQUELIN

## 2022-04-28 ENCOUNTER — OFFICE VISIT (OUTPATIENT)
Dept: ONCOLOGY | Age: 52
End: 2022-04-28
Payer: COMMERCIAL

## 2022-04-28 VITALS
HEIGHT: 69 IN | SYSTOLIC BLOOD PRESSURE: 133 MMHG | OXYGEN SATURATION: 95 % | HEART RATE: 112 BPM | DIASTOLIC BLOOD PRESSURE: 83 MMHG | WEIGHT: 293 LBS | BODY MASS INDEX: 43.4 KG/M2 | TEMPERATURE: 97.6 F

## 2022-04-28 DIAGNOSIS — I10 PRIMARY HYPERTENSION: ICD-10-CM

## 2022-04-28 DIAGNOSIS — Z98.890 HISTORY OF LUMPECTOMY OF LEFT BREAST: ICD-10-CM

## 2022-04-28 DIAGNOSIS — M79.7 FIBROMYALGIA: ICD-10-CM

## 2022-04-28 DIAGNOSIS — Z92.3 HISTORY OF THERAPEUTIC RADIATION: ICD-10-CM

## 2022-04-28 DIAGNOSIS — Z85.3 HISTORY OF BREAST CANCER: Primary | ICD-10-CM

## 2022-04-28 PROCEDURE — 99213 OFFICE O/P EST LOW 20 MIN: CPT | Performed by: INTERNAL MEDICINE

## 2022-04-28 NOTE — PROGRESS NOTES
Ernesto Moore is a 49 y.o. female  Chief Complaint   Patient presents with    Follow-up    Breast Cancer     1. Have you been to the ER, urgent care clinic since your last visit? Hospitalized since your last visit? No.  2. Have you seen or consulted any other health care providers outside of the [address] [place] [address] since your last visit? Include any pap smears or colon screening. No.    Patient states she had complete Hysterectomy.

## 2022-04-28 NOTE — PROGRESS NOTES
[facility] [place] [facility] [place] [address], [place]: [phone]  F: [phone]    Reason for Visit:   Belen Colunga is a 49 y.o. female who is seen for fu  history of breast cancer. Treatment History:   Breast biopsy  Lumpectomy 2016  AC-T chemo / XRT. STAGE: 3 by report triple negative    History of Present Illness:     Pt seen today for office 6 mo fu for hx of breast cancer not on any therapy from here. Doing well overall. No new issues. mammo 1/22. To see PCP soon. Labs and routine HM with PCP. No fevers/ chills/ chest pain/ SOB/ nausea/ vomiting/diarrhea/ pain/fatigue        Last visit:   consult for triple breast cancer dx 2016 in [place] at [facility]. Hx of lump/ AC-T chemo / XRT. No records here. Need records. Last mammo 12/20 at [facility] [place] and good. Hx of diverticulitis/ HTN/ fibromyalgia/ IBS/ spinal stenosis. Hx of bone scan / spine imaging. Has lymphedema and uses pump. No cancer recurrence. Pt works as . From [employer] and moved back here.    No fevers/ chills/ chest pain/ SOB/ nausea/ vomiting/diarrhea/         Past Medical History:   Diagnosis Date    Arrhythmia     MURMUR    Asthma     AS A CHILD    Breast cancer ([address] 07/21/2016    LEFT    Diverticulitis     Diverticulosis     Esophageal spasm     Fibromyalgia     GERD (gastroesophageal reflux disease)     Heart murmur     History of chemotherapy 01/11/1977    Completed    Hypertension     IBS (irritable bowel syndrome)     Ill-defined condition     LYMPHEDEMA LEFT ARM    Lymphedema     LEFT ARM    Morbid obesity ([address]     Nausea & vomiting     Osteoarthritis     PUD (peptic ulcer disease)     YEARS AGO    S/P radiation therapy 04/12/2017    Completed    Thalassemia       Past Surgical History:   Procedure Laterality Date    HX BARIATRIC SURGERY  10/2010    lap band    HX BREAST LUMPECTOMY Left 07/2016    CHEMO AND RADIATION    HX COLONOSCOPY  2017  HX DILATION AND CURETTAGE  11/2020, 2021    HX GASTRIC BYPASS  10/2010    LAP BAND    HX KNEE ARTHROSCOPY Bilateral 2008    HX ORTHOPAEDIC  10/2014    right foot and ankle reconstruction for flat feet    HX ORTHOPAEDIC Left 2006    ACHILLES TENDON REPAIR    HX TONSILLECTOMY        Social History     Tobacco Use    Smoking status: Never Smoker    Smokeless tobacco: Never Used   Substance Use Topics    Alcohol use: Not Currently      Family History   Problem Relation Age of Onset    OSTEOARTHRITIS Mother     Thalassemia Mother     Hypertension Mother     Heart Disease Father         chf    Cancer Father         lung    Hypertension Father     Arthritis Father     Heart Failure Father    Genevieve Cuellar Arthritis-rheumatoid Brother     Hypertension Brother     Hypertension Brother     Elevated Lipids Brother     OSTEOARTHRITIS Brother     OSTEOARTHRITIS Sister     Other Brother         SARCOIDOSIS    Anesth Problems Neg Hx      Current Outpatient Medications   Medication Sig    cetirizine HCl (ZYRTEC PO) Take  by mouth.  DULoxetine (CYMBALTA) 60 mg capsule 1 cap po every day.  losartan-hydroCHLOROthiazide (HYZAAR) 100-12.5 mg per tablet Take 1 tablet every day by oral route.  dilTIAZem ER (CARDIZEM CD) 180 mg capsule Take 1 Capsule by mouth daily.  polyethylene glycol (Miralax) 17 gram packet as needed.  aspirin-acetaminophen-caffeine (Excedrin Extra Strength) 250-250-65 mg per tablet 2 Tablets two (2) times a day.  wheat dextrin/calcium/aspartam (BENEFIBER + CALCIUM SUGAR-FREE PO) daily.  propylene glycoL (Systane Balance) 0.6 % drop Apply 1 Drop to eye two (2) times daily as needed.  biotin 10,000 mcg cap Take 1 Tablet by mouth daily.  cholecalciferol (VITAMIN D3) (5000 Units/125 mcg) tab tablet Take 5,000 Units by mouth daily.  clotrimazole-betamethasone (LOTRISONE) topical cream Apply  to affected area as needed.     cyanocobalamin 1,000 mcg tablet Take 100 mcg by mouth daily.    folic acid 903 mcg tablet Take 400 mcg by mouth daily.  multivitamin capsule Take 1 Capsule by mouth daily.  megestroL (MEGACE) 20 mg tablet TAKE 1 TABLET BY MOUTH EVERY DAY (Patient not taking: Reported on 3/22/2022)    ondansetron (ZOFRAN ODT) 4 mg disintegrating tablet Take 1 Tablet by mouth every eight (8) hours as needed for Nausea or Vomiting.  loratadine (CLARITIN) 10 mg tablet Take 10 mg by mouth daily. No current facility-administered medications for this visit. Allergies   Allergen Reactions    Other Food Sneezing    Adhesive Other (comments)     Tears skin off. Severe.  Oxycodone Itching    Lisinopril Other (comments)     \"constant tickle in my throat\"  \"constant tickle in my throat\"      Mold Other (comments)    Nickel Other (comments)    Sulfa (Sulfonamide Antibiotics) Other (comments)    Sulfamethoxazole-Trimethoprim Hives    Anesthetics - Amide Type - Select Amino Amides Nausea Only        A complete review of systems was obtained, negative except as described above and as reported on ROS sheet scanned into system. Physical Exam:     Visit Vitals  /83 (BP 1 Location: Right arm, BP Patient Position: Sitting) Comment: waiting   Pulse (!) 112   Temp 97.6 °F (36.4 °C) (Temporal)   Ht 5' 9\" (1.753 m)   Wt 335 lb 11.2 oz (152.3 kg)   LMP 11/30/2021 (Approximate)   SpO2 95%   BMI 49.57 kg/m²     ECOG PS: 0  General: No distress  Eyes: PERRLA, anicteric sclerae  HENT: Atraumatic, wearing mask  Neck: Supple  Respiratory: CTAB, normal respiratory effort  CV: Normal rate, regular rhythm, no murmurs, no peripheral edema  GI: Soft, nontender, nondistended, no masses  MS: Normal gait and station. Digits without clubbing or cyanosis. Skin: No rashes, ecchymoses, or petechiae. Normal temperature, turgor, and texture.   Psych: Alert, oriented, appropriate affect, normal judgment/insight  Neuro non focal  Breast LEFT lumpectomy scar, no masses palpable b/l Results:     Lab Results   Component Value Date/Time    WBC 8.7 02/03/2022 09:51 AM    HGB 9.8 (L) 02/03/2022 09:51 AM    HCT 31.3 (L) 02/03/2022 09:51 AM    PLATELET 565 81/17/4193 09:51 AM    MCV 87.7 02/03/2022 09:51 AM    ABS. NEUTROPHILS 5.7 02/03/2022 09:51 AM     Lab Results   Component Value Date/Time    Sodium 139 02/03/2022 09:51 AM    Potassium 3.8 02/03/2022 09:51 AM    Chloride 109 (H) 02/03/2022 09:51 AM    CO2 27 02/03/2022 09:51 AM    Glucose 103 (H) 02/03/2022 09:51 AM    BUN 10 02/03/2022 09:51 AM    Creatinine 0.67 02/03/2022 09:51 AM    GFR est AA >60 02/03/2022 09:51 AM    GFR est non-AA >60 02/03/2022 09:51 AM    Calcium 9.0 02/03/2022 09:51 AM     Lab Results   Component Value Date/Time    Bilirubin, total 0.4 02/03/2022 09:51 AM    ALT (SGPT) 20 02/03/2022 09:51 AM    Alk. phosphatase 132 (H) 02/03/2022 09:51 AM    Protein, total 7.2 02/03/2022 09:51 AM    Albumin 3.5 02/03/2022 09:51 AM    Globulin 3.7 02/03/2022 09:51 AM         Records reviewed and summarized above. Pathology report(s) reviewed above. Radiology report(s) reviewed above. Assessment:/PLAN     1)  Reported stage 3 LEFT breast cancer triple negative dx in 2016  hx of lump/ chemo AC-T/ radiation in [place] [facility]   history reviewed with pt today. Need records from [place] [facility].   Dx in 2016. Seen today for 6 mo. Not on any therapy now. Doing well without cancer recurrence. Last mammo 1/22 with fat necrosis. Discussed MRI and can do anytime. Labs and routine HM. Will continue course of surveillance. 2) post menopausal.  Hx of ANABEL. Path negative. 3) Hx of diverticulitis/ HTN/ fibromyalgia/ IBS/ spinal stenosis. Hx of bone scan / spine imaging. Per PCP. 4) hot flashes. Taking black cohosh which is helping. 5) lymphedema. LE clinic      6) hx thalassemia. Hx of anemia. Check labs. 7) Psychosocial.  Mood good. Coping well. Works as an . Just moved back to [facility]. Has family support here. SW support today. Fu here in 6 months  Call if questions    I appreciate the opportunity to participate in Ms. Sergio Mccoy's care.     Signed By: Sue Chand, DO

## 2022-06-28 RX ORDER — DILTIAZEM HYDROCHLORIDE 180 MG/1
CAPSULE, COATED, EXTENDED RELEASE ORAL
Qty: 90 CAPSULE | Refills: 1 | Status: SHIPPED | OUTPATIENT
Start: 2022-06-28 | End: 2022-11-02 | Stop reason: SDUPTHER

## 2022-08-05 ENCOUNTER — HOSPITAL ENCOUNTER (EMERGENCY)
Age: 52
Discharge: HOME | End: 2022-08-05
Attending: EMERGENCY MEDICINE
Payer: COMMERCIAL

## 2022-08-05 ENCOUNTER — APPOINTMENT (OUTPATIENT)
Dept: GENERAL RADIOLOGY | Age: 52
End: 2022-08-05
Payer: COMMERCIAL

## 2022-08-05 VITALS
OXYGEN SATURATION: 98 % | DIASTOLIC BLOOD PRESSURE: 90 MMHG | TEMPERATURE: 98.1 F | SYSTOLIC BLOOD PRESSURE: 165 MMHG | RESPIRATION RATE: 16 BRPM | BODY MASS INDEX: 41.95 KG/M2 | HEIGHT: 70 IN | HEART RATE: 85 BPM | WEIGHT: 293 LBS

## 2022-08-05 DIAGNOSIS — M54.41 ACUTE RIGHT-SIDED LOW BACK PAIN WITH RIGHT-SIDED SCIATICA: Primary | ICD-10-CM

## 2022-08-05 PROCEDURE — 72100 X-RAY EXAM L-S SPINE 2/3 VWS: CPT

## 2022-08-05 PROCEDURE — 99284 EMERGENCY DEPT VISIT MOD MDM: CPT

## 2022-08-05 PROCEDURE — 74011250636 HC RX REV CODE- 250/636: Performed by: PHYSICIAN ASSISTANT

## 2022-08-05 PROCEDURE — 96372 THER/PROPH/DIAG INJ SC/IM: CPT

## 2022-08-05 RX ORDER — METHOCARBAMOL 500 MG/1
500 TABLET, FILM COATED ORAL 4 TIMES DAILY
Qty: 20 TABLET | Refills: 0 | Status: SHIPPED | OUTPATIENT
Start: 2022-08-05

## 2022-08-05 RX ORDER — KETOROLAC TROMETHAMINE 10 MG/1
10 TABLET, FILM COATED ORAL
Qty: 20 TABLET | Refills: 0 | Status: SHIPPED | OUTPATIENT
Start: 2022-08-05 | End: 2022-08-10

## 2022-08-05 RX ORDER — KETOROLAC TROMETHAMINE 30 MG/ML
30 INJECTION, SOLUTION INTRAMUSCULAR; INTRAVENOUS
Status: COMPLETED | OUTPATIENT
Start: 2022-08-05 | End: 2022-08-05

## 2022-08-05 RX ADMIN — KETOROLAC TROMETHAMINE 30 MG: 30 INJECTION, SOLUTION INTRAMUSCULAR; INTRAVENOUS at 15:32

## 2022-08-05 NOTE — ED PROVIDER NOTES
Date of Service:  8/5/2022    Patient:  Mohan Anderson    Chief Complaint:  Back Pain and Leg Pain       HPI:  Mohan Anderson is a 49 y.o.  female who presents for evaluation of              Past Medical History:   Diagnosis Date    Arrhythmia     MURMUR    Asthma     AS A CHILD    [facility] 07/21/2016    LEFT    Diverticulitis     Diverticulosis     Esophageal spasm     Fibromyalgia     GERD (gastroesophageal reflux disease)     Heart murmur     History of chemotherapy 01/11/1977    Completed    Hypertension     IBS (irritable bowel syndrome)     Ill-defined condition     LYMPHEDEMA LEFT ARM    Lymphedema     LEFT ARM    [facility]bid [facility] ([facility]     Nausea & vomiting     Osteoarthritis     PUD (peptic ulcer disease)     YEARS AGO    S/P radiation therapy 04/12/2017    Completed    Thalassemia        Past Surgical History:   Procedure Laterality Date    HX BARIATRIC SURGERY  10/2010    lap band    HX BREAST LUMPECTOMY Left 07/2016    CHEMO AND RADIATION    HX COLONOSCOPY  2017    HX DILATION AND CURETTAGE  11/2020, 2021    HX GASTRIC BYPASS  10/2010    LAP BAND    HX KNEE ARTHROSCOPY Bilateral 2008    HX ORTHOPAEDIC  10/2014    right foot and ankle reconstruction for flat feet    HX ORTHOPAEDIC Left 2006    ACHILLES TENDON REPAIR    HX TONSILLECTOMY           Family History:   Problem Relation Age of Onset    OSTEOARTHRITIS Mother     Thalassemia Mother     Hypertension Mother     Heart Disease Father         chf    Cancer Father         lung    Hypertension Father     Arthritis Father     Heart Failure Father     Arthritis-rheumatoid Brother     Hypertension Brother     Hypertension Brother     Elevated Lipids Brother     OSTEOARTHRITIS Brother     OSTEOARTHRITIS Sister     Other Brother         SARCOIDOSIS    Anesth Problems Neg Hx        Social History     Socioeconomic History    Marital status: SINGLE     Spouse name: Not on file    Number of children: Not on file    Years of education: Not on file    Highest education level: Not on file   Occupational History    Not on file   Tobacco Use    Smoking status: Never    Smokeless tobacco: Never   Vaping Use    Vaping Use: Never used   Substance and Sexual Activity    Alcohol use: Not Currently    Drug use: Never    Sexual activity: Not Currently   Other Topics Concern    Not on file   Social History Narrative    Not on file     Social Determinants of Health     Financial Resource Strain: Not on file   Food Insecurity: Not on file   Transportation Needs: Not on file   Physical Activity: Not on file   Stress: Not on file   Social Connections: Unknown    Frequency of Communication with Friends and Family: More than three times a week    Frequency of Social Gatherings with Friends and Family: More than three times a week    Attends Latter-day Services: Not on file   [employer] [facility] or Organizations: Not on file    Attends Club or Organization Meetings: Not on file    Marital Status: Not on file   Intimate Partner Violence: Not on file   Housing Stability: Not on file         ALLERGIES: Other food, Adhesive, Oxycodone, Lisinopril, Mold, Nickel, Sulfa (sulfonamide antibiotics), Sulfamethoxazole-trimethoprim, and Anesthetics - amide type - select amino amides    Review of Systems   Constitutional:  Negative for chills and fever. HENT:  Negative for facial swelling. Respiratory:  Negative for shortness of breath. Cardiovascular:  Negative for chest pain. Gastrointestinal:  Negative for abdominal pain. Genitourinary:  Negative for dysuria. Musculoskeletal:  Positive for back pain. Skin:  Negative for rash. Allergic/Immunologic: Negative for immunocompromised state. Neurological:  Negative for headaches. Psychiatric/Behavioral:  Negative for agitation. All other systems reviewed and are negative.     Vitals:    08/05/22 1420   BP: (!) 165/90   Pulse: 85   Resp: 16   Temp: 98.1 °F (36.7 °C)   SpO2: 98%   Weight: 153.3 kg (338 lb)   Height: 5' 10\" (1.778 m)            Physical Exam  Vitals and nursing note reviewed. Constitutional:       General: She is not in acute distress. Cardiovascular:      Rate and Rhythm: Normal rate and regular rhythm. Heart sounds: No murmur heard. Pulmonary:      Effort: Pulmonary effort is normal.      Breath sounds: Normal breath sounds. Abdominal:      Palpations: Abdomen is soft. Tenderness: There is no abdominal tenderness. Musculoskeletal:         General: Tenderness present. No swelling, deformity or signs of injury. Normal range of motion. Skin:     General: Skin is warm. Neurological:      Mental Status: She is alert and oriented to person, place, and time. Mental status is at baseline. MDM         Procedures      VITAL SIGNS:  Patient Vitals for the past 4 hrs:   Temp Pulse Resp BP SpO2   08/05/22 1420 98.1 °F (36.7 °C) 85 16 (!) 165/90 98 %         LABS:  No results found for this or any previous visit (from the past 6 hour(s)). IMAGING:  XR SPINE LUMB 2 OR 3 V   Final Result   Lumbar and lumbosacral degenerative disc disease. Medications During Visit:  Medications   ketorolac (TORADOL) injection 30 mg (30 mg IntraMUSCular Given 8/5/22 1532)         DECISION MAKING:  Edith Deras is a 49 y.o. female who comes in as above. ***      IMPRESSION:  1.  Acute right-sided low back pain with right-sided sciatica        DISPOSITION:  Discharged      Current Discharge Medication List           Follow-up Information       Follow up With Specialties Details Why Contact Info    [facility]  Schedule an appointment as soon as possible for a visit   [address] [place] [address]  [phone]    [facility] EMERGENCY DEPT Emergency Medicine  If symptoms worsen [address] [place] [address]  [phone]

## 2022-08-05 NOTE — ED PROVIDER NOTES
Date of Service:  8/5/2022    Patient:  Bowen Lemon    Chief Complaint:  Back Pain and Leg Pain       HPI:  Bowen Lemon is a 49 y.o.  female who presents for evaluation of right-sided lower back pain x5 days. Patient states symptoms have worsened. Patient describes a sharp right-sided back pain that radiates to her right thigh. Patient denies any known trauma or injury. Patient states that she has been prescribed muscle relaxers, Medrol Dosepak, and Percocet for her pain. Patient states no improvement of symptoms. Patient denies any known trauma or injury. Patient denies difficulty walking. Patient denies numbness, tingling, or weakness and right lower extremity. Patient denies chest pain, shortness of breath, or abdominal pain. Patient denies dysuria, urgency, or frequency. Patient denies loss of urinary/bowel function. Patient states she has a appointment with [facility] on August 16.              Past Medical History:   Diagnosis Date    Arrhythmia     MURMUR    Asthma     AS A CHILD    Breast cancer ([facility] [address] 07/21/2016    LEFT    Diverticulitis     Diverticulosis     Esophageal spasm     Fibromyalgia     GERD (gastroesophageal reflux disease)     Heart murmur     History of chemotherapy 01/11/1977    Completed    Hypertension     IBS (irritable bowel syndrome)     Ill-defined condition     LYMPHEDEMA LEFT ARM    Lymphedema     LEFT ARM    Morbid obesity ([address]     Nausea & vomiting     Osteoarthritis     PUD (peptic ulcer disease)     YEARS AGO    S/P radiation therapy 04/12/2017    Completed    Thalassemia        Past Surgical History:   Procedure Laterality Date    HX BARIATRIC SURGERY  10/2010    lap band    HX BREAST LUMPECTOMY Left 07/2016    CHEMO AND RADIATION    HX COLONOSCOPY  2017    HX DILATION AND CURETTAGE  11/2020, 2021    HX GASTRIC BYPASS  10/2010    LAP BAND    HX KNEE ARTHROSCOPY Bilateral 2008    HX ORTHOPAEDIC  10/2014    right foot and ankle reconstruction for flat feet HX ORTHOPAEDIC Left 2006    ACHILLES TENDON REPAIR    HX TONSILLECTOMY           Family History:   Problem Relation Age of Onset    OSTEOARTHRITIS Mother     Thalassemia Mother     Hypertension Mother     Heart Disease Father         chf    Cancer Father         lung    Hypertension Father     Arthritis Father     Heart Failure Father     Arthritis-rheumatoid Brother     Hypertension Brother     Hypertension Brother     Elevated Lipids Brother     OSTEOARTHRITIS Brother     OSTEOARTHRITIS Sister     Other Brother         SARCOIDOSIS    Anesth Problems Neg Hx        Social History     Socioeconomic History    Marital status: SINGLE     Spouse name: Not on file    Number of children: Not on file    Years of education: Not on file    Highest education level: Not on file   Occupational History    Not on file   Tobacco Use    Smoking status: Never    Smokeless tobacco: Never   Vaping Use    Vaping Use: Never used   Substance and Sexual Activity    Alcohol use: Not Currently    Drug use: Never    Sexual activity: Not Currently   Other Topics Concern    Not on file   Social History Narrative    Not on file     Social Determinants of Health     Financial Resource Strain: Not on file   Food Insecurity: Not on file   Transportation Needs: Not on file   Physical Activity: Not on file   Stress: Not on file   Social Connections: Unknown    Frequency of Communication with Friends and Family: More than three times a week    Frequency of Social Gatherings with Friends and Family: More than three times a week    Attends Restoration Services: Not on file    Active Member of Clubs or Organizations: Not on file    Attends Club or Organization Meetings: Not on file    Marital Status: Not on file   Intimate Partner Violence: Not on file   Housing Stability: Not on file         ALLERGIES: Other food, Adhesive, Oxycodone, Lisinopril, Mold, Nickel, Sulfa (sulfonamide antibiotics), Sulfamethoxazole-trimethoprim, and Anesthetics - amide type - select amino amides    Review of Systems   Constitutional:  Negative for chills and fever. Respiratory:  Negative for shortness of breath. Cardiovascular:  Negative for chest pain. Gastrointestinal:  Negative for abdominal pain. Genitourinary:  Negative for dysuria. Musculoskeletal:  Positive for back pain. Negative for neck pain. Skin:  Negative for rash. Allergic/Immunologic: Negative for immunocompromised state. Neurological:  Negative for headaches. Psychiatric/Behavioral:  Negative for agitation. All other systems reviewed and are negative. Vitals:    08/05/22 1420   BP: (!) 165/90   Pulse: 85   Resp: 16   Temp: 98.1 °F (36.7 °C)   SpO2: 98%   Weight: 153.3 kg (338 lb)   Height: 5' 10\" (1.778 m)            Physical Exam  Vitals and nursing note reviewed. Constitutional:       General: She is not in acute distress. Cardiovascular:      Rate and Rhythm: Normal rate and regular rhythm. Heart sounds: No murmur heard. Pulmonary:      Effort: Pulmonary effort is normal.      Breath sounds: Normal breath sounds. Abdominal:      Palpations: Abdomen is soft. Tenderness: There is no abdominal tenderness. Musculoskeletal:         General: Tenderness present. No swelling, deformity or signs of injury. Normal range of motion. Cervical back: Normal range of motion. No tenderness. Comments: Tenderness to right lower back. Pain with flexion and extension. Sensation intact. Skin:     General: Skin is warm. Neurological:      Mental Status: She is alert and oriented to person, place, and time. Mental status is at baseline. MDM         Procedures      VITAL SIGNS:  Patient Vitals for the past 4 hrs:   Temp Pulse Resp BP SpO2   08/05/22 1420 98.1 °F (36.7 °C) 85 16 (!) 165/90 98 %         LABS:  No results found for this or any previous visit (from the past 6 hour(s)).      IMAGING:  XR SPINE LUMB 2 OR 3 V   Final Result   Lumbar and lumbosacral degenerative disc disease. Medications During Visit:  Medications   ketorolac (TORADOL) injection 30 mg (30 mg IntraMUSCular Given 8/5/22 1532)         DECISION MAKING:  Marline Osborne is a 49 y.o. female who comes in as above. X-rays of the lumbar spine showed no acute abnormality. Results discussed with patient. Patient states improvement of symptoms after receiving Toradol. Patient instructed to follow-up with [facility] on her scheduled appointment date of August 16 2022. Patient agreed to plan of care. Patient stable upon discharge. Return precautions given to patient. IMPRESSION:  1. Acute right-sided low back pain with right-sided sciatica        DISPOSITION:  Discharged      Discharge Medication List as of 8/5/2022  3:27 PM        CONTINUE these medications which have NOT CHANGED    Details   dilTIAZem ER (CARDIZEM CD) 180 mg capsule TAKE 1 CAPSULE BY MOUTH EVERY DAY, Normal, Disp-90 Capsule, R-1      cetirizine HCl (ZYRTEC PO) Take  by mouth., Historical Med      DULoxetine (CYMBALTA) 60 mg capsule 1 cap po every day., Normal, Disp-90 Capsule, R-1      losartan-hydroCHLOROthiazide (HYZAAR) 100-12.5 mg per tablet Take 1 tablet every day by oral route., Normal, Disp-90 Tablet, R-1      megestroL (MEGACE) 20 mg tablet TAKE 1 TABLET BY MOUTH EVERY DAY, Normal, Disp-30 Tablet, R-0      ondansetron (ZOFRAN ODT) 4 mg disintegrating tablet Take 1 Tablet by mouth every eight (8) hours as needed for Nausea or Vomiting., Normal, Disp-20 Tablet, R-0      polyethylene glycol (Miralax) 17 gram packet as needed., Historical Med      aspirin-acetaminophen-caffeine (Excedrin Extra Strength) 250-250-65 mg per tablet 2 Tablets two (2) times a day., Historical Med      wheat dextrin/calcium/aspartam (BENEFIBER + CALCIUM SUGAR-FREE PO) daily. , Historical Med      propylene glycoL (Systane Balance) 0.6 % drop Apply 1 Drop to eye two (2) times daily as needed., Historical Med      biotin 10,000 mcg cap Take 1 Tablet by mouth daily. , Historical Med      cholecalciferol (VITAMIN D3) (5000 Units/125 mcg) tab tablet Take 5,000 Units by mouth daily. , Historical Med      clotrimazole-betamethasone (LOTRISONE) topical cream Apply  to affected area as needed., Historical Med      cyanocobalamin 1,000 mcg tablet Take 100 mcg by mouth daily. , Historical Med      folic acid 977 mcg tablet Take 400 mcg by mouth daily. , Historical Med      loratadine (CLARITIN) 10 mg tablet Take 10 mg by mouth daily. , Historical Med      multivitamin capsule Take 1 Capsule by mouth daily. , Historical Med              Follow-up Information       Follow up With Specialties Details Why Contact Info    [facility]  Schedule an appointment as soon as possible for a visit   [address] [place] [address]  [phone]    [facility] EMERGENCY DEPT Emergency Medicine  If symptoms worsen [address] [place] [address]  [phone]

## 2022-08-05 NOTE — ED TRIAGE NOTES
Pt states here with right SI pain with radiation into her right thigh. Pt states she was placed on muscle relaxers, medrol dose pack, and Percocet. Pt here with no relief. Denies any bowel or bladder problems.

## 2022-08-08 ENCOUNTER — HOSPITAL ENCOUNTER (OUTPATIENT)
Dept: PHYSICAL THERAPY | Age: 52
Discharge: HOME | End: 2022-08-08
Payer: COMMERCIAL

## 2022-08-08 VITALS — WEIGHT: 293 LBS | BODY MASS INDEX: 41.95 KG/M2 | HEIGHT: 70 IN

## 2022-08-08 PROCEDURE — 97140 MANUAL THERAPY 1/> REGIONS: CPT

## 2022-08-08 PROCEDURE — 97162 PT EVAL MOD COMPLEX 30 MIN: CPT

## 2022-08-08 NOTE — PROGRESS NOTES
[facility]  Lymphedema Clinic  [address] [facility], [address], [place]  22.    INITIAL EVALUATION    NAME: Mohan Anderson AGE: 49 y.o. GENDER: female  DATE: 8/8/2022  REFERRING PHYSICIAN: Dr. Joe Albert:   Primary Diagnosis:  Lymphedema, not elsewhere classified (I89.0)  Other Treatment Diagnoses:  Swelling not relieved by elevation (R60.9)  History of breast cancer (Z85.890)  History of lumpectomy of left beast (G97.686)   Fibromyalgia (M79.7)   History of therapeutic radiation (Z92.3)  Date of Onset: referral 10/20/21, patient returns for re-evaluation  Present Symptoms and Functional Limitations: Patient reports onset of left upper extremity lymphedema s/p treatment for left breast cancer in 7/2016 followed by chemotherapy and then radiation treatments 2/2017 to 4/12/2017. Patient reports significant skin injury with radiation treatments. She developed left upper extremity trunk/breast cording and left upper extremity and truncal lymphedema after her treatments. She resided in [place] at the time and was seen by a lymphedema program in that region. She received Belisse bra, Medi esprit upper extremity compression garments, Tribute night compression products and a lymphapress compression pump. She developed a cellulitis infection in the trunk in 2018. She has a history of some weeping from the breast during her mammogram in 2017. Patient moved to [place] within the past year and was seen at [address] [place] [address] last fall. Patient is reporting exacerbation of cording. Patient also has a history of chronic lower extremity lymphedema with right leg initially swelling after reconstructive surgery to correct flat foot. She now notes left leg swelling.  She wears Sig Varis compression knee highs 20-30mmHg but reports the left leg swells significantly during the day even with compression in the place.      Lymphedema Life Impact Scale (LLIS): scores 16 out of 64 with  25% impairment. Past Medical History:   Past Medical History:   Diagnosis Date    Arrhythmia     MURMUR    Asthma     AS A CHILD    Breast cancer ([facility] [address] 07/21/2016    LEFT    Diverticulitis     Diverticulosis     Esophageal spasm     Fibromyalgia     GERD (gastroesophageal reflux disease)     Heart murmur     History of chemotherapy 01/11/1977    Completed    Hypertension     IBS (irritable bowel syndrome)     Ill-defined condition     LYMPHEDEMA LEFT ARM    Lymphedema     LEFT ARM    Morbid obesity ([facility] [address]     Nausea & vomiting     Osteoarthritis     PUD (peptic ulcer disease)     YEARS AGO    S/P radiation therapy 04/12/2017    Completed    Thalassemia      Past Surgical History:   Procedure Laterality Date    HX BARIATRIC SURGERY  10/2010    lap band    HX BREAST LUMPECTOMY Left 07/2016    CHEMO AND RADIATION    HX COLONOSCOPY  2017    HX DILATION AND CURETTAGE  11/2020, 2021    HX GASTRIC BYPASS  10/2010    LAP BAND    HX KNEE ARTHROSCOPY Bilateral 2008    HX ORTHOPAEDIC  10/2014    right foot and ankle reconstruction for flat feet    HX ORTHOPAEDIC Left 2006    ACHILLES TENDON REPAIR    HX TONSILLECTOMY       Current Medications:  See updated list in paper chart  Current Outpatient Medications   Medication Sig    ketorolac (TORADOL) 10 mg tablet Take 1 Tablet by mouth every six (6) hours as needed for Pain for up to 5 days. methocarbamoL (ROBAXIN) 500 mg tablet Take 1 Tablet by mouth four (4) times daily. As needed for muscle spasms    dilTIAZem ER (CARDIZEM CD) 180 mg capsule TAKE 1 CAPSULE BY MOUTH EVERY DAY    cetirizine HCl (ZYRTEC PO) Take  by mouth. DULoxetine (CYMBALTA) 60 mg capsule 1 cap po every day. losartan-hydroCHLOROthiazide (HYZAAR) 100-12.5 mg per tablet Take 1 tablet every day by oral route.     megestroL (MEGACE) 20 mg tablet TAKE 1 TABLET BY MOUTH EVERY DAY (Patient not taking: Reported on 3/22/2022) ondansetron (ZOFRAN ODT) 4 mg disintegrating tablet Take 1 Tablet by mouth every eight (8) hours as needed for Nausea or Vomiting. polyethylene glycol (Miralax) 17 gram packet as needed. aspirin-acetaminophen-caffeine (Excedrin Extra Strength) 250-250-65 mg per tablet 2 Tablets two (2) times a day. wheat dextrin/calcium/aspartam (BENEFIBER + CALCIUM SUGAR-FREE PO) daily. propylene glycoL (Systane Balance) 0.6 % drop Apply 1 Drop to eye two (2) times daily as needed. biotin 10,000 mcg cap Take 1 Tablet by mouth daily. cholecalciferol (VITAMIN D3) (5000 Units/125 mcg) tab tablet Take 5,000 Units by mouth daily. clotrimazole-betamethasone (LOTRISONE) topical cream Apply  to affected area as needed. cyanocobalamin 1,000 mcg tablet Take 100 mcg by mouth daily. folic acid 138 mcg tablet Take 400 mcg by mouth daily. loratadine (CLARITIN) 10 mg tablet Take 10 mg by mouth daily. multivitamin capsule Take 1 Capsule by mouth daily. No current facility-administered medications for this encounter. Allergies: Allergies   Allergen Reactions    Other Food Sneezing    Adhesive Other (comments)     Tears skin off. Severe. Oxycodone Itching    Lisinopril Other (comments)     \"constant tickle in my throat\"  \"constant tickle in my throat\"      Mold Other (comments)    Nickel Other (comments)    Sulfa (Sulfonamide Antibiotics) Other (comments)    Sulfamethoxazole-Trimethoprim Hives    Anesthetics - Amide Type - Select Amino Amides Nausea Only        Prior Level of Function/Social/Work History/Personal factors and/or comorbidities impacting plan of care: Patient works full time from home as an . .  She is originally from  [place] and moved back here from [place] in the last year. Her name is pronounced \"Shira\" with a silent I.   Patient resides alone currently  Living Situation: Private residence  Trainable Caregiver?:  none noted  Mobility: Independent gait without any assistive device for community distances, modified independent with self care and bathing  Sleeping Arrangement:  in bed- patient has an adjustable bed  Adaptive Equipment Owned: compression garments  Other: Patient is able to don compression garments and does not have assistance. Previous Therapy:   Patient was previously treated in 2017 and possibly 2018 at a lymphedema program in [place]. Treated at [facility] 11/9/2021 to 1/11/2022  Compression/Lymphedema Equipment: Medi esprit custom compression sleeve and glove, [place] wear [place] and prima bras with solaris pad,  Belisse bra, Solaris tribute left upper extremity and possibly right lower extremity knee length, lymphapress compression pump with left upper extremity garment received in 2017 or 2018    SUBJECTIVE: I have what seems like one big cord that has been bothering me. I am working on it and stretching but it is still there. It has been flared up lately. My arm swelling seems to be doing okay. I didn't get to come back in last February because I had a hysterectomy and then an infection. My back has been really big problem and it is getting worse with numbness down my leg now. I did get my leg compression garments and they fit well. I haven't been wearing them because of my back and having to bend over to put them on. Patients goals for therapy:  to  get rid of cording and pain . OBJECTIVE DATA SUMMARY:   EXAMINATION/PRESENTATION/DECISION MAKING:     Pain: Patient reports current left upper extremity pain of 2/10 numeric scale but it it increases to 5/10 numeric scale with increased activity and she describes it as tightness and pulling that travels down her medial and posterior medial upper arm. Stretching helps. She also has leg pain due to the swelling and reports her pain increases with increased swelling in her legs.      Pain Scale 1: Numeric (0 - 10)  Pain Intensity 1: 2  Pain Location 1: Arm  Pain Orientation 1: Left  Pain Description 1: Sharp;Tightness    Self-Care and ADLs: Independent to modified independent    Skin and Tissue Assessment:  Dermal Status: Intact and Scars left lateral breast    Texture/Consistency: Boggy trunk and left upper arm    Pigmentation/Color Change: Hyperpigmented    Anomalies: N/A for upper trunk    Circulatory:  no history of DVT noted. Nails: Normal    Stemmers Sign: Negative for left hand     Height:  Height: 5' 9.5\" (176.5 cm)  Weight:  Weight: 149.7 kg (330 lb)   BMI:  BMI (calculated): 48.1  (36 or greater: adversely affecting lymphedema)  Wound/Ulcer:  N/A        Volumetric Measurements: upper extremity  Right: 5,891.80mL (decreased by 21.30 ml since last last assessment 1/11/2022 Left: 6,025.19 mL (decreased by 303.62 ml since last last assessment 1/11/2022)   % Difference: 2.26% left larger than right versus 7.03%  left larger than right on last assessment 1/11/2022 Dominance: right   (See scanned graph)  Patient continues to report lateral trunk swelling. Lower extremity: Volumes will be assessed on future visit as time permit      Range of Motion: [facility] for bilateral upper extremities with activities performed in the clinic but patient reports tightness and pulling in left axilla and upper arm with abduction and flexion activities  Strength: Not formally assessed 2* patient is able to complete all functional activities in the clinic today. Sensation:  Impaired  some numbness in reported in axillary region     Palpation:   Axillary web cording symptoms persist  Mobility:    Bed/Chair Mobility: Independent  Transfers: Independent  Gait: Independent  Endurance: good     Safety:  Patient is alert and oriented: yes  Safety awareness: good  Fall risk?: low        Evaluation Time:  3079-9700     minutes: 40    TREATMENT PROVIDED:   1.   Treatment description:  Therapeutic Exercise and Procedure:Patient is performing axillary web cording stretches at home during the day and she is encouraged to continue to do so.   Treatment time:  N/A  Minutes: 0    2. Treatment description:  Manual Therapy:  Reviewed skin care with low pH lotion following manual lymphatic drainage principles for application. Patient is using Jergen's at home. Reviewed the signs and symptoms of cellulitis and to contact her physician if symptoms are present. Discussed treatment components and goals with patient and she is knowledgeable of treatment having had treatment in the past. Patient has multiple regions of concern. Most limiting for her is the cording currently per her reports and she is in need of new compression garments. .  Discussed assessment results with regard to left arm swelling which is decreased from last assessment. Assessed her compression garments for the upper extremity with good fit noted. Patient is also in need of new arm sleeves. Proceeded to measure for new custom Medi mondi 350 glove and sleeve in class 2 with wide silicone band, elbow knitting marks and steep oblique upper arm on the sleeve. Glove is an extended glove. Patient requests the color of caramel. Discussed bra options as patient reports her compression bras do not feel tight and she is wondering if she needs a different size. Measured for bra and she is sized for a 2 extra large which is what she currently is wearing. She reports she tends to wear the [employer] which provides lighter pressure than the Platial which she also has. Explained and demonstrated the difference and patient verbalized understanding. She will use the [place] more and will order a new Prima bra for her in the color of black per her preference. Patient still prefers the [address] to the [place]. Followed with discussion of her Lymphapress which she is using 2 times a day on her left arm. She would like to try for a Flexitouch to address upper and lower extremities, but she may not yet qualify for a new device.  Requested patient take pictures of her current device as it is unclear if she received it in 2017 or 2018 and then therapist can discuss with [employer] .  Will pursue a new device when permitted. Provided patient with Lymphapress representative's information as she reports her lymphapress garment velcro is no longer sticking. She will contact  them to see if they will replace the garment. Treatment time:  1520-6806  Minutes: 72  Post treatment pain level:  2 out of 10 numeric scale    ASSESSMENT:    Addie Jimenez is a 49 y.o. female who presents with stage 2 lymphedema of her left upper extremity and left trunk secondary to a history of left breast cancer with lumpectomy and radiation treatments in 2016 and 2017. 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 Patient also presents with lower extremity lymphedema right worse than left with a history reconstructive surgery on the right leg to correct pez planus. Patient is motivated to improve her condition and is in need of new upper and lower extremity compression products as well as compression bra. Patient will benefit from complete decongestive therapy (CDT) including: Manual lymphatic drainage (MLD) technique, soft tissue mobilizations, Short-stretch textile bandages/compression system to decongest limb and Kinesiotaping as appropriate. This care is medically necessary due to the infection risk with lymphedema and to improve functional activities. CDT is necessary to resolve swelling to allow patient to return to wearing normal clothes/footwear and prevent worsening of symptoms, such as infections and/or hospitalizations. Patient will be independent with home program strategies to allow improved ADL ability and mobility and to allow patient to return to greatest functional independence. Rehabilitation potential is considered to be Good.   Factors which may influence rehabilitation potential include patient has used compression in the past and is knowledgeable of treatment components and home program requirements. Patient to be seen 1 every 1-4 weeks for 12-16 weeks. PLAN OF CARE:   Recommendations and Planned Interventions: Manual lymph drainage/decongestive therapy, Compression garment fitting/provision, Lymphedema therapeutic exercise, Self-care training, Functional mobility training, Education in skin care and lymphedema precautions, and Self-MLD education per home program    GOALS  Short Term Goals: To be accomplished in 6 weeks:  1. Patient will demonstrate knowledge of signs/symptoms of infections/cellulitis and be independent in skin care to prevent cellulitis. 2.  Patient will demonstrate independence in lymphedema home program of therapeutic exercises to improve circulation and decongest limb to improve ADLs. 3.  Patient will receive appropriately fitting custom compression garments to prevent worsening of swelling over time. 4.  Patient will be independent in axillary web cording self soft tissue mobilizations. Long Term Goals: To be accomplished in 16 weeks:  1. Pt will show improvement in the Lymphedema Life Impact Scale by decreasing the score to 20% and thus allow improvement in patient's quality of life. 2.  Patient will be independent with don/doff of compression system and use in order to prevent reaccumulation of fluid at discharge. 3.  Pt will be independent in self-MLD and be pain free with functional mobility for transition to independent restorative phase of lymphedema therapy. Patient has participated in goal setting and agrees to work toward plan of care. Patient was instructed to call if questions or concerns arise.     Thank you for this referral.   VISHNU Vargas, LAVELLE           Total timed codes: 65  1:1 Treatment time: 72

## 2022-08-08 NOTE — PROGRESS NOTES
Outpatient Lymphedema Clinic  a part of [address]  [address] [facility], [address], [address] [facility], [address] [place] [address]  Phone: [phone]  Fax: [phone]    Plan of Care/Statement of Necessity for Physical Therapy/Occupational Therapy Services  2-15    Patient name: Urbano Calvillo  : 1970  Provider#: [id number]  Referral source: Ammy Tellez*      Medical/Treatment Diagnosis: Lymphedema, not elsewhere classified [I89.0]     Prior Hospitalization: see medical history     Comorbidities: breast cancer, fibromyalgia, HTN, morbid obesity, osteoarthritis, and thalassemia, hysterectomy  Prior Level of Function: Modified independent  Medications: Verified on Patient Summary List  Start of Care: 2022      Onset Date: 10/20/21   The Plan of Care and following information is based on the information from the initial evaluation. Assessment/ carey information: Urbano Calvillo is a 49 y.o. female who presents with stage 2 lymphedema of her left upper extremity and left trunk secondary to a history of left breast cancer with lumpectomy and radiation treatments in  and . 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 Patient also presents with lower extremity lymphedema right worse than left with a history reconstructive surgery on the right leg to correct pez planus. Patient is motivated to improve her condition and is in need of new upper and lower extremity compression products as well as compression bra. Patient will benefit from complete decongestive therapy (CDT) including: Manual lymphatic drainage (MLD) technique, soft tissue mobilizations, Short-stretch textile bandages/compression system to decongest limb and Kinesiotaping as appropriate. This care is medically necessary due to the infection risk with lymphedema and to improve functional activities.   CDT is necessary to resolve swelling to allow patient to return to wearing normal clothes/footwear and prevent worsening of symptoms, such as infections and/or hospitalizations. Patient will be independent with home program strategies to allow improved ADL ability and mobility and to allow patient to return to greatest functional independence. Evaluation Complexity History HIGH Complexity :3+ comorbidities / personal factors will impact the outcome/ POC ; Examination MEDIUM Complexity : 3 Standardized tests and measures addressing body structure, function, activity limitation and / or participation in recreation  ;Presentation MEDIUM Complexity : Evolving with changing characteristics  ; Clinical Decision Making Other outcome measures LLIS scores 25% impairment  MEDIUM  Overall Complexity Rating: MEDIUM    Problem List: pain affecting function, decrease ROM, edema affecting function, and other patient requires new compression garments to prevent worsening of swelling over time. Treatment Plan may include any combination of the following: Therapeutic exercise, Manual therapy, Patient education, Self Care training, and Other: Measuring and fitting for compression garments, vasopneumatic device if appropriate  Patient / Family readiness to learn indicated by: asking questions, trying to perform skills, and interest  Persons(s) to be included in education: patient (P)  Barriers to Learning/Limitations: None  Patient Goal (s): to get rid of cording and pain  Patient Self Reported Health Status: Not assessed  Rehabilitation Potential: good    Short Term Goals: To be accomplished in 6 weeks:  1. Patient will demonstrate knowledge of signs/symptoms of infections/cellulitis and be independent in skin care to prevent cellulitis. 2.  Patient will demonstrate independence in lymphedema home program of therapeutic exercises to improve circulation and decongest limb to improve ADLs.   3.  Patient will receive appropriately fitting custom compression garments to prevent worsening of swelling over time. 4.  Patient will be independent in axillary web cording self soft tissue mobilizations. Long Term Goals: To be accomplished in 16 weeks:  1. Pt will show improvement in the Lymphedema Life Impact Scale by decreasing the score to 20% and thus allow improvement in patient's quality of life. 2.  Patient will be independent with don/doff of compression system and use in order to prevent reaccumulation of fluid at discharge. 3.  Pt will be independent in self-MLD and be pain free with functional mobility for transition to independent restorative phase of lymphedema therapy. Frequency / Duration: Patient to be seen 1 every 1-4 weeks for 12-16 weeks. Patient/ Caregiver education and instruction: self care and other compression garment options and ordering process    [x]  Plan of care has been reviewed with PTA        Certification Period: 08/8/2022 to 12/31/2022     VISHNU Michel, KELLEY-YAQUELIN  8/8/2022     ________________________________________________________________________    I certify that the above Therapy Services are being furnished while the patient is under my care. I agree with the treatment plan and certify that this therapy is necessary.     [de-identified] Signature:____________________  Date:____________Time: _________         Amita Yusuf*

## 2022-08-08 NOTE — PROGRESS NOTES
Statement of Medical Necessity  Page 1 of 2      Meek Curran 1970 Today's Date: 8/8/2022 MARISOL: Lifetime   Payor: Thomas Ayala / Plan: Danielle Nunes Se HMO / Product Type: HMO /  ME: TBD  Refills: 2                   Diagnosis  []   I97.2 Post-Mastectomy Lymphedema []   I87.2 Venous Insufficiency   [x]   I89.0 Lymphedema, other secondary  []   I83.019 Venous Stasis Ulcer LE, Right   []   I89.9 Unspecified Lymphatic Disorder []   I83.029 Venous Stasis Ulcer LE, Left   [x]   R60.9 Swelling not relieved by elevation []   Q82.0 Hereditary/ Congenital Lymphedema   []   C50.211 Malignant neoplasm of breast, Right []   G89.3  Cancer associated pain   [x]    Z98.890  History of lumpectomy of left beast []   L03.115 LE Cellulitis, Right   [x]  Z85.890 History of breast cancer []   L03.116 LE Cellulitis, Left                                   Meek Curran    1970  Page 2 of 2    Physician Order for DME for Diagnosis of Lymphedema as Listed on Statement of Medical Necessity, Page 1         Recommended Product:  Units Upper Extremity Rt Lt Units Lower Extremity Rt Lt    Circ-Aid, Ready Wrap, Sigvaris Arm    Inelastic binders (Circ-Aid, Farrow)  []Foot   []Below Knee   []Knee   []Thigh      Circ-Aid Ready Wrap, Sigvaris hand    Tomás Jus, night use []Full Leg  []Lower Leg     1 Tribute Arm, night use  x 2 Tribute, night use  []Full Leg  []Lower Leg x x    Tomás Jus Arm, night use    Amos Sleeve Leg/ Foot, night use     4 Gradiant Compression Sleeves & Gloves  [x]Custom [] RM Arm:  []CCL1 [x]CCL2 []CCL3  [x]Custom [] RM Glove: []CCL1 [x]CCL2 []CCL3    x 4 Gradient Compression Stockings   [x]Custom  []RM Lower Extremity:   []CCL1       [x]CCL2         []CCL3   [x]Knee       []Thigh        []Waist Length x x    Amos sleeve arm w/ hand, night use    Tribute Wrap, night use     4 Compression Bra  x        Compression Vest         The above patient was referred for treatment of Lymphedema due to the diagnosis indicated above. Lymphedema is a progressive and chronic condition. It may cause acute infections, muscle atrophy, discomfort, and connective tissue fibrosis with irreversible tissue damage, decreased ROM in the affected limb and diminished functional mobility possibly interfering with independence and ability to work. Recurrent infections and wound complications that commonly occur with Lymphedema often require hospitalization and extensive wound care, thus increasing medical costs. The patient has received complete decongestive therapy which includes manual lymphatic drainage, lymphedema specific exercises, compression bandaging, and hygiene/skin care. Goals of therapy are to reduce the edema and prevent re-accumulation of fluid with its complications. It is medically necessary for this patient to have daytime garments to control this condition. They will need 2 sets (one set to wear and one set to wash for adequate skin care and wearing time). Garments must be replaced every 4-6 months for effectiveness. There are no substitutes available that offer acceptable compression treatment for this Lymphedema patient. If further information is requested, please contact our certified lymphedema therapists at [phone].     [x] Miranda Urban, PT, MSPT, CLT-YAQUELIN [] Giancarlo Scott, MS, OTR/L, CLT []  Levi Arboleda, PT, CLT [] Pola Tsang, PTA, CLT, CSIS    Printed  Provider Name  Dr. Andrew Vigil Provider Signature  Date    Provider NPI

## 2022-08-15 ENCOUNTER — APPOINTMENT (OUTPATIENT)
Dept: PHYSICAL THERAPY | Age: 52
End: 2022-08-15
Payer: COMMERCIAL

## 2022-08-23 ENCOUNTER — HOSPITAL ENCOUNTER (OUTPATIENT)
Dept: PHYSICAL THERAPY | Age: 52
Discharge: HOME | End: 2022-08-23
Payer: COMMERCIAL

## 2022-08-23 PROCEDURE — 97110 THERAPEUTIC EXERCISES: CPT

## 2022-08-23 PROCEDURE — 97140 MANUAL THERAPY 1/> REGIONS: CPT

## 2022-08-23 NOTE — PROGRESS NOTES
LYMPHEDEMA PT DAILY TREATMENT NOTE - [facility] 2-15    Patient Name: Dunia Sin  Date:2022  : 1970  [x]  Patient  Verified  Payor: Ute Peralta / Plan: Danielle Nunes Se HMO / Product Type: HMO /    In time:1:00 pm  Out time:2:15 pm  Total Treatment Time (min): 75  Total Timed Codes (min): 75    Visit #: 2    Treatment Area: Lymphedema, not elsewhere classified [I89.0]    SUBJECTIVE  Pain Level (0-10 scale): 5/10 Patient continues with back pain and will be getting an MRI. Patient also having discomfort from axillary web cording in trunk/chest/axilla and L UE. Any medication changes, allergies to medications, adverse drug reactions, diagnosis change, or new procedure performed?: [x] No    [] Yes (see summary sheet for update)  Subjective functional status/changes:   [x] No changes reported  Patient reports that she is continuing with back pain and will be going for an MRI for further work up. Patient reports that she continues to have axillary web symptoms that are significant causing her discomfort especially in the L upper arm. Patient has spoken with vendor/insurance regarding her compression garment order and they are working on resubmitting some information so that she can get her previous compression garment order issues resolved before they can process her new order. Patient reports that she is using her basic vasopneumatic compression pump (Lymphapress), but is not having any improvement with regular use. OBJECTIVE  8 min Therapeutic Exercise:  [] Alyne Proper Exercises [] Remedial Lymphedema Exercise Program [x] Axillary Web Exercise Program       [] Shoulder ROM Exercises   Rationale: Activate muscle pump to improve lymphatic fluid movement and decrease swelling to improve the patients ability to perform ADL and IADL skills and prevent worsening of swelling over time. 67 min Manual Therapy    Kinesiotaping: Deferred due to skin sensitivity in the past with taping she reports. Manual Lymphatic Drainage (MLD):  Area to decongest: UE: left and upper quadrant    Patient also with B LE involvement as well. Sequence used and effectiveness: Modifications were made to manual lymph drainage sequence to exclude cervical techniques secondary to patient's age. Secondary sequence for lower extremities with trunk involvement. 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 Patient able to rest in modified supine position due to back pain she can not tolerate pressure of even a pillow to support her. Skin/wound care/debridement: Reviewed skin care principles:   Skin care products  Hygiene  Prevention of cellulitis   Applied multi-layer compression bandaging to: Deferred      Upper/Lower Extremity Compression: In process. Patient wore in her L UE compression garments today. Awaiting vendor to order new products due to issues with last garment order not resolved per vendor. Patient is in communication with vendor and insurance company to resolve current issues with processing her new garment order. Rationale: decrease pain, increase ROM, increase tissue extensibility, decrease edema , and increase postural awareness to improve the patients ability to prevent worsening of swelling and axillary cord symptoms over time. 0   Vasopneumatic Device:   Patient has an older Lymphapress basic pump that she uses that is not working effectively for her. Would benefit from an advanced vasopneumatic device when eligible. Rationale: To improve lymphatic fluid movement and decrease swelling to improve the patients ability to perform ADL and IADL skills and prevent worsening of swelling over time. With   [x] TE   [] TA   [x] MT   [] SC   [] other: Patient Education: [x] Review HEP    [x] MLD Patient Education Reviewed with patient, as well as demonstration and instruction during MLD portion of the session.    Continued education in self MLD technique with bathing and skin care. Provided patient with the written instructions to follow. Educated patient in soft tissue mobilization for axillary web. Dycem provided. [x] Progressed/Changed HEP based on:   [x] positioning   [] Kinesiotape   [x] Skin care   [] wound care   [x] other: Garment ordering process/vasopneumatic pump upgrade options    Compression bandaging/garment precautions reviewed: [x] Yes  [] No     Other Objective/Functional Measures:   Deferred measurements today. Will reassess on next visit. Pain Level (0-10 scale) post treatment: 5/10    ASSESSMENT/Changes in Function:   Patient returns today and is continuing with significant back pain so she will be getting MRI later this week. Patient was able to tolerate session for focus on MLD and soft tissue mobilizations to L upper quadrant/L UE to reduce tightness and discomfort from axillary web cording that is significant. Patient will continue to work on the exercises/stretches as tolerated and wearing her compression products until she returns next visit. Patient has a basic vasopneumatic device so will assist her in looking into options for upgrade if possible since she is not having success with the device and has had the product for ~5 years. Patient to return for follow up in the clinic next week. Patient will continue to benefit from skilled PT services to  address swelling, analyze and address soft tissue restrictions, analyze compression product fit and use, assess and modify postural abnormalities, instruct in home lymphedema management program, measure for compression products, and modify and progress therapeutic interventions to attain remaining goals. []  See Plan of Care  []  See progress note/recertification  []  See Discharge Summary         Progress towards goals / Updated goals:  Short Term Goals:  1.   Patient will demonstrate knowledge of signs/symptoms of infections/cellulitis and be independent in skin care to prevent cellulitis. In progress. 2.  Patient will demonstrate independence in lymphedema home program of therapeutic exercises to improve circulation and decongest limb to improve ADLs. In progress. 3.  Patient will receive appropriately fitting custom compression garments to prevent worsening of swelling over time. In progress. 4.  Patient will be independent in axillary web cording self soft tissue mobilizations. In progress. Long Term Goals[de-identified]  1.  Pt will show improvement in the Lymphedema Life Impact Scale by decreasing the score to 20% and thus allow improvement in patient's quality of life. In progress. 2.  Patient will be independent with don/doff of compression system and use in order to prevent reaccumulation of fluid at discharge. In progress. 3.  Pt will be independent in self-MLD and be pain free with functional mobility for transition to independent restorative phase of lymphedema therapy. In progress.     PLAN  []  Upgrade activities as tolerated     [x]  Continue plan of care  []  Update interventions per flow sheet       []  Discharge due to:_  []  Other:_      Georges MCARTHUR Patch, PTA, CLT 8/25/2022

## 2022-09-08 ENCOUNTER — TELEPHONE (OUTPATIENT)
Dept: INTERNAL MEDICINE CLINIC | Age: 52
End: 2022-09-08

## 2022-09-08 NOTE — TELEPHONE ENCOUNTER
Spoke to patient she has been seen three times in August in the ER,for her back pain. She doesn't have an appointment until October. Patient has an appointment in October for [facility]. She would like a call from Dr. Sammy Arita to discuss if she needs to come in earlier.

## 2022-09-08 NOTE — TELEPHONE ENCOUNTER
----- Message from Vale Reardon sent at 8/26/2022  2:17 PM EDT -----  Subject: Message to Provider    QUESTIONS  Information for Provider? Patient would like Dr. Parisa Barnett to give her a   call in reference to back pain that she is currently having.  ---------------------------------------------------------------------------  --------------  [id number]  [phone]; OK to leave message on voicemail  ---------------------------------------------------------------------------  --------------  SCRIPT ANSWERS  Relationship to Patient?  Self

## 2022-09-08 NOTE — TELEPHONE ENCOUNTER
Patient has not been seen by anyone in our office since December 2021. At that time I referred her to PT for her back pain. Please have her schedule an appointment.

## 2022-09-19 ENCOUNTER — APPOINTMENT (OUTPATIENT)
Dept: PHYSICAL THERAPY | Age: 52
End: 2022-09-19

## 2022-09-29 ENCOUNTER — APPOINTMENT (OUTPATIENT)
Dept: PHYSICAL THERAPY | Age: 52
End: 2022-09-29

## 2022-10-04 RX ORDER — DULOXETIN HYDROCHLORIDE 60 MG/1
CAPSULE, DELAYED RELEASE ORAL
Qty: 90 CAPSULE | Refills: 1 | Status: SHIPPED | OUTPATIENT
Start: 2022-10-04 | End: 2022-11-02 | Stop reason: SDUPTHER

## 2022-10-04 RX ORDER — LOSARTAN POTASSIUM AND HYDROCHLOROTHIAZIDE 12.5; 1 MG/1; MG/1
TABLET ORAL
Qty: 90 TABLET | Refills: 1 | Status: SHIPPED | OUTPATIENT
Start: 2022-10-04 | End: 2022-11-02 | Stop reason: SDUPTHER

## 2022-10-04 NOTE — PROGRESS NOTES
Subjective:   47 y.o. female for Well Woman Check. Her gyne and breast care is done elsewhere by her Ob-Gyne physician. Patient Active Problem List    Diagnosis Date Noted    Postmenopausal bleeding 02/10/2022    PMB (postmenopausal bleeding) 10/26/2021    Thalassemia     Hypertension     IBS (irritable bowel syndrome)     Esophageal spasm     Morbid obesity (HCC)     Diverticulosis     Diverticulitis     Fibromyalgia     Osteoarthritis     Breast cancer ([facility] [address]      Current Outpatient Medications   Medication Sig Dispense Refill    desonide (TRIDESILON) 0.05 % cream APPLY A SMALL AMOUNT TO AFFECTED AREA TWICE A DAY AS NEEDED      Linzess 290 mcg cap capsule TAKE 1 CAPSULE EVERY MORNING 30 MIN. BEFORE FIRST MEAL      budesonide (RHINOCORT AQUA) 32 mcg/actuation nasal spray 2 Sprays by Both Nostrils route daily. varicella-zoster recombinant, PF, (Shingrix, PF,) 50 mcg/0.5 mL susr injection 0.5 mL by IntraMUSCular route once for 1 dose. Repeat x 1 dose in 2-6 months 0.5 mL 1    losartan-hydroCHLOROthiazide (HYZAAR) 100-12.5 mg per tablet TAKE 1 TABLET BY MOUTH EVERY DAY 90 Tablet 1    DULoxetine (CYMBALTA) 60 mg capsule TAKE 1 CAPSULE BY MOUTH EVERY DAY 90 Capsule 1    methocarbamoL (ROBAXIN) 500 mg tablet Take 1 Tablet by mouth four (4) times daily. As needed for muscle spasms 20 Tablet 0    dilTIAZem ER (CARDIZEM CD) 180 mg capsule TAKE 1 CAPSULE BY MOUTH EVERY DAY 90 Capsule 1    aspirin-acetaminophen-caffeine (Excedrin Extra Strength) 250-250-65 mg per tablet 2 Tablets two (2) times a day. propylene glycoL (Systane Balance) 0.6 % drop Apply 1 Drop to eye two (2) times daily as needed. biotin 10,000 mcg cap Take 1 Tablet by mouth daily. cholecalciferol (VITAMIN D3) (5000 Units/125 mcg) tab tablet Take 5,000 Units by mouth daily. cyanocobalamin 1,000 mcg tablet Take 100 mcg by mouth daily. folic acid 418 mcg tablet Take 400 mcg by mouth daily.       multivitamin capsule Take 1 Capsule by mouth daily. Allergies   Allergen Reactions    Other Food Sneezing    Adhesive Other (comments)     Tears skin off. Severe.       Oxycodone Itching    Lisinopril Other (comments)     \"constant tickle in my throat\"  \"constant tickle in my throat\"      Mold Other (comments)    Nickel Other (comments)    Sulfa (Sulfonamide Antibiotics) Other (comments)    Sulfamethoxazole-Trimethoprim Hives    Anesthetics - Amide Type - Select Amino Amides Nausea Only     Past Medical History:   Diagnosis Date    Arrhythmia     MURMUR    Asthma     AS A CHILD    Breast cancer ([facility] [address] 07/21/2016    LEFT    Diverticulitis     Diverticulosis     Esophageal spasm     Fibromyalgia     GERD (gastroesophageal reflux disease)     Heart murmur     History of chemotherapy 01/11/1977    Completed    Hypertension     IBS (irritable bowel syndrome)     Ill-defined condition     LYMPHEDEMA LEFT ARM    Lymphedema     LEFT ARM    Morbid obesity ([facility] [address]     Nausea & vomiting     Osteoarthritis     PUD (peptic ulcer disease)     YEARS AGO    S/P radiation therapy 04/12/2017    Completed    Thalassemia      Past Surgical History:   Procedure Laterality Date    HX BARIATRIC SURGERY  10/2010    lap band    HX BREAST LUMPECTOMY Left 07/2016    CHEMO AND RADIATION    HX COLONOSCOPY  2017    HX DILATION AND CURETTAGE  11/2020, 2021    HX GASTRIC BYPASS  10/2010    LAP BAND    HX HYSTERECTOMY      HX KNEE ARTHROSCOPY Bilateral 2008    HX ORTHOPAEDIC  10/2014    right foot and ankle reconstruction for flat feet    HX ORTHOPAEDIC Left 2006    ACHILLES TENDON REPAIR    HX TONSILLECTOMY       Family History   Problem Relation Age of Onset    OSTEOARTHRITIS Mother     Thalassemia Mother     Hypertension Mother     Heart Disease Father         chf    Cancer Father         lung    Hypertension Father     Arthritis Father     Heart Failure Father     Arthritis-rheumatoid Brother     Hypertension Brother     Hypertension Brother     Elevated Lipids Brother OSTEOARTHRITIS Brother     OSTEOARTHRITIS Sister     Other Brother         SARCOIDOSIS    Anesth Problems Neg Hx      Social History     Tobacco Use    Smoking status: Never    Smokeless tobacco: Never   Substance Use Topics    Alcohol use: Not Currently        Lab Results   Component Value Date/Time    WBC 8.7 02/03/2022 09:51 AM    HGB 9.8 (L) 02/03/2022 09:51 AM    HCT 31.3 (L) 02/03/2022 09:51 AM    PLATELET 277 06/52/7183 09:51 AM    MCV 87.7 02/03/2022 09:51 AM     Lab Results   Component Value Date/Time    Hemoglobin A1c 5.4 02/03/2022 09:51 AM    Glucose 103 (H) 02/03/2022 09:51 AM    LDL, calculated 124 (H) 12/13/2021 10:10 AM    Creatinine 0.67 02/03/2022 09:51 AM      Lab Results   Component Value Date/Time    Cholesterol, total 177 12/13/2021 10:10 AM    HDL Cholesterol 35 (L) 12/13/2021 10:10 AM    LDL, calculated 124 (H) 12/13/2021 10:10 AM    LDL-C, External 146 09/01/2020 12:00 AM    Triglyceride 97 12/13/2021 10:10 AM     Lab Results   Component Value Date/Time    ALT (SGPT) 20 02/03/2022 09:51 AM    Alk. phosphatase 132 (H) 02/03/2022 09:51 AM    Bilirubin, total 0.4 02/03/2022 09:51 AM    Albumin 3.5 02/03/2022 09:51 AM    Protein, total 7.2 02/03/2022 09:51 AM    PLATELET 514 59/62/7818 09:51 AM       Lab Results   Component Value Date/Time    GFR est non-AA >60 02/03/2022 09:51 AM    GFR est AA >60 02/03/2022 09:51 AM    Creatinine 0.67 02/03/2022 09:51 AM    BUN 10 02/03/2022 09:51 AM    Sodium 139 02/03/2022 09:51 AM    Potassium 3.8 02/03/2022 09:51 AM    Chloride 109 (H) 02/03/2022 09:51 AM    CO2 27 02/03/2022 09:51 AM         Specific concerns today:   Chronic low back pain right SI which radiates through groin into knee. Had been seeing [facility] but now having HELIO today with [facility]. Last injections were in 2018 back in [place]. Hot flashes, ? Anxiety contributing. Had ANABEL/BSO in February. Had previously gone through menopause approx 2018 (chemo induced).   Hot flashes have continued since 2018. Post hysterectomy has had some days that she is better. Seeing allergist, Dr. Tiffany Arias - told allergic to her sweat. Causing scalp to be very itchy and swelling. Has her washing hair regularly. Continued joint pain. Seeing lymphedema clinic, Dr. Nohemi Erickson sent for left arm and breast post breast cancer. Also with swelling and lymphedema right ankle. Needs referral.    Had episode of depression. Increased crying but better now. Some anxiety. On Cymbalta for pain in neck and fibromyalgia. Review of Systems  Constitutional: negative  Eyes: negative  Ears, nose, mouth, throat, and face: negative except for mild tinnitus  Respiratory: negative  Cardiovascular: negative except for lower extremity edema  Gastrointestinal: negative except for occ food caught in throat, on Linzess for IBS C which was helped with constipation and abdominal pain. Recent nml colonoscopy without polyps  Genitourinary:negative except for mild stress incontinence  Integument/breast: negative  Hematologic/lymphatic: negative  Musculoskeletal:negative except for neck pain, back pain, and ALSO pain in right hand withsome tinging. Neurological: negative  Behavioral/Psych: negative except for anxiety and depression  Endocrine: negative  Allergic/Immunologic: negative    Objective:   Blood pressure 130/82, pulse 85, temperature (!) 96.4 °F (35.8 °C), temperature source Temporal, resp. rate 18, height 5' 9.5\" (1.765 m), weight 332 lb 6.4 oz (150.8 kg), last menstrual period 11/30/2021, SpO2 98 %.    Physical Examination:   General appearance - alert, well appearing, and in no distress and oriented to person, place, and time  Mental status - alert, oriented to person, place, and time,   Eyes - pupils equal and reactive, extraocular eye movements intact  Ears - bilateral TM's and external ear canals normal  Nose - normal and patent, no erythema, discharge or polyps  Mouth - mucous membranes moist, pharynx normal without lesions  Neck - supple, no significant adenopathy, carotids upstroke normal bilaterally, no bruits, thyroid exam: thyroid is normal in size without nodules or tenderness  Lymphatics - no palpable lymphadenopathy, no hepatosplenomegaly  Chest - clear to auscultation, no wheezes, rales or rhonchi, symmetric air entry  Heart - normal rate, regular rhythm, normal S1, S2, no murmurs, rubs, clicks or gallops  Abdomen - soft, nontender, nondistended, no masses or organomegaly  bowel sounds normal  Back exam - full range of motion, no tenderness, palpable spasm or pain on motion  Neurological - alert, oriented, normal speech, no focal findings or movement disorder noted  Musculoskeletal - no joint tenderness, deformity or swelling  Extremities - peripheral pulses normal, no pedal edema, no clubbing or cyanosis  Skin - normal coloration and turgor, no rashes, no suspicious skin lesions noted     Assessment/Plan:   53yo female here for CPE  call if any problems  Diagnoses and all orders for this visit:    1. Routine general medical examination at a health care facility  -     METABOLIC PANEL, COMPREHENSIVE; Future  -     LIPID PANEL; Future  -     CBC W/O DIFF; Future  -     HEMOGLOBIN A1C WITH EAG; Future  -     TSH 3RD GENERATION; Future  -     VITAMIN D, 25 HYDROXY; Future    2. Essential hypertension - controlled on repeat. Continue Losartan/hctz    3. PMB (postmenopausal bleeding) - resolved post ANABEL/BSO. No cancer on pathology    4. Irritable bowel syndrome with constipation - improved with Linzess. Continue same. 5. Malignant neoplasm of left breast in female, estrogen receptor negative, unspecified site of breast ([address] - no sign of recurrence. UTD with oncology and mammogram    6. Spinal stenosis of lumbar region, unspecified whether neurogenic claudication present - upcoming HELIO. 7. Morbid obesity ([address] - discussed diet and exercsie for weight loss.       8. Right ankle swelling  -     REFERRAL TO LYMPHEDEMA CLINIC    9. Fibromyalgia-  continue Cymbalta. Restart exercise post HELIO    10. Thalassemia, unspecified type- chronic mild anemia. Other orders  -     varicella-zoster recombinant, PF, (Shingrix, PF,) 50 mcg/0.5 mL susr injection; 0.5 mL by IntraMUSCular route once for 1 dose.  Repeat x 1 dose in 2-6 months

## 2022-10-05 ENCOUNTER — OFFICE VISIT (OUTPATIENT)
Dept: INTERNAL MEDICINE CLINIC | Age: 52
End: 2022-10-05
Payer: COMMERCIAL

## 2022-10-05 VITALS
HEIGHT: 70 IN | RESPIRATION RATE: 18 BRPM | HEART RATE: 85 BPM | OXYGEN SATURATION: 98 % | SYSTOLIC BLOOD PRESSURE: 130 MMHG | TEMPERATURE: 96.4 F | DIASTOLIC BLOOD PRESSURE: 82 MMHG | BODY MASS INDEX: 41.95 KG/M2 | WEIGHT: 293 LBS

## 2022-10-05 DIAGNOSIS — D56.9 THALASSEMIA, UNSPECIFIED TYPE: ICD-10-CM

## 2022-10-05 DIAGNOSIS — M25.471 RIGHT ANKLE SWELLING: ICD-10-CM

## 2022-10-05 DIAGNOSIS — I10 ESSENTIAL HYPERTENSION: ICD-10-CM

## 2022-10-05 DIAGNOSIS — C50.912 MALIGNANT NEOPLASM OF LEFT BREAST IN FEMALE, ESTROGEN RECEPTOR NEGATIVE, UNSPECIFIED SITE OF BREAST (HCC): ICD-10-CM

## 2022-10-05 DIAGNOSIS — K58.1 IRRITABLE BOWEL SYNDROME WITH CONSTIPATION: ICD-10-CM

## 2022-10-05 DIAGNOSIS — M48.061 SPINAL STENOSIS OF LUMBAR REGION, UNSPECIFIED WHETHER NEUROGENIC CLAUDICATION PRESENT: ICD-10-CM

## 2022-10-05 DIAGNOSIS — N95.0 PMB (POSTMENOPAUSAL BLEEDING): ICD-10-CM

## 2022-10-05 DIAGNOSIS — Z00.00 ROUTINE GENERAL MEDICAL EXAMINATION AT A HEALTH CARE FACILITY: Primary | ICD-10-CM

## 2022-10-05 DIAGNOSIS — E66.01 MORBID OBESITY (HCC): ICD-10-CM

## 2022-10-05 DIAGNOSIS — Z17.1 MALIGNANT NEOPLASM OF LEFT BREAST IN FEMALE, ESTROGEN RECEPTOR NEGATIVE, UNSPECIFIED SITE OF BREAST (HCC): ICD-10-CM

## 2022-10-05 DIAGNOSIS — M79.7 FIBROMYALGIA: ICD-10-CM

## 2022-10-05 PROCEDURE — 99396 PREV VISIT EST AGE 40-64: CPT | Performed by: INTERNAL MEDICINE

## 2022-10-05 RX ORDER — ZOSTER VACCINE RECOMBINANT, ADJUVANTED 50 MCG/0.5
0.5 KIT INTRAMUSCULAR ONCE
Qty: 0.5 ML | Refills: 1 | Status: SHIPPED | OUTPATIENT
Start: 2022-10-05 | End: 2022-10-05

## 2022-10-05 RX ORDER — BENZOCAINE .13; .15; .5; 2 G/100G; G/100G; G/100G; G/100G
2 GEL ORAL DAILY
COMMUNITY

## 2022-10-05 RX ORDER — DESONIDE 0.5 MG/G
CREAM TOPICAL
COMMUNITY
Start: 2022-04-21

## 2022-10-05 RX ORDER — IBUPROFEN 200 MG
TABLET ORAL
COMMUNITY
Start: 2022-02-10 | End: 2022-10-05 | Stop reason: ALTCHOICE

## 2022-10-05 RX ORDER — LINACLOTIDE 290 UG/1
CAPSULE, GELATIN COATED ORAL
COMMUNITY
Start: 2022-09-28

## 2022-10-05 NOTE — PROGRESS NOTES
Reviewed record in preparation for visit and have obtained necessary documentation. Identified pt with two pt identifiers(name and ). Chief Complaint   Patient presents with    Physical     Blood pressure (!) 149/77, pulse 85, temperature (!) 96.4 °F (35.8 °C), temperature source Temporal, resp. rate 18, height 5' 9.5\" (1.765 m), weight 332 lb 6.4 oz (150.8 kg), last menstrual period 2021, SpO2 98 %. Health Maintenance Due   Topic Date Due    Hepatitis C Test  Never done    Shingles Vaccine (1 of 2) Never done    COVID-19 Vaccine (4 - Booster for Moderna series) 2022    Yearly Flu Vaccine (1) 2022       Ms. Andreina Andersen has a reminder for a \"due or due soon\" health maintenance. I have asked that she discuss this further with her primary care provider for follow-up on this health maintenance. Coordination of Care Questionnaire:  :     1) Have you been to an emergency room, urgent care clinic since your last visit? yes , Patient states she [facility] to [facility] for back pain   Hospitalized since your last visit? no             2) Have you seen or consulted any other health care providers outside of [address] [place] [address] since your last visit? yes  [facility]     3) In the event something were to happen to you and you were unable to speak on your behalf, do you have an Advance Directive/ Living Will in place stating your wishes?  YES

## 2022-10-27 ENCOUNTER — OFFICE VISIT (OUTPATIENT)
Dept: ONCOLOGY | Age: 52
End: 2022-10-27
Payer: COMMERCIAL

## 2022-10-27 VITALS
TEMPERATURE: 96.2 F | HEIGHT: 70 IN | WEIGHT: 293 LBS | OXYGEN SATURATION: 98 % | SYSTOLIC BLOOD PRESSURE: 138 MMHG | BODY MASS INDEX: 41.95 KG/M2 | DIASTOLIC BLOOD PRESSURE: 84 MMHG

## 2022-10-27 DIAGNOSIS — Z98.890 HISTORY OF LUMPECTOMY OF LEFT BREAST: ICD-10-CM

## 2022-10-27 DIAGNOSIS — I10 PRIMARY HYPERTENSION: ICD-10-CM

## 2022-10-27 DIAGNOSIS — M79.7 FIBROMYALGIA: ICD-10-CM

## 2022-10-27 DIAGNOSIS — Z85.3 HISTORY OF BREAST CANCER: Primary | ICD-10-CM

## 2022-10-27 PROCEDURE — 3078F DIAST BP <80 MM HG: CPT | Performed by: INTERNAL MEDICINE

## 2022-10-27 PROCEDURE — 3074F SYST BP LT 130 MM HG: CPT | Performed by: INTERNAL MEDICINE

## 2022-10-27 PROCEDURE — 99213 OFFICE O/P EST LOW 20 MIN: CPT | Performed by: INTERNAL MEDICINE

## 2022-10-27 NOTE — PROGRESS NOTES
[facility] [place] [address] [address], [place]: [phone]  F: [phone]    Reason for Visit:   Kinjal Garcia is a 47 y.o. female who is seen for fu  history of breast cancer. Treatment History:   Breast biopsy  Lumpectomy 2016  AC-T chemo / XRT. STAGE: 3 by report triple negative    History of Present Illness:     Pt seen today for office 6 mo fu for hx of breast cancer not on any therapy from here. Doing well overall. No new issues. mammo 1/22. To see PCP soon. Labs and routine HM with PCP. No fevers/ chills/ chest pain/ SOB/ nausea/ vomiting/diarrhea/ pain/fatigue        Last visit:   consult for triple breast cancer dx 2016 in [place] at [facility]. Hx of lump/ AC-T chemo / XRT. No records here. Need records. Last mammo 12/20 at [facility] [place] and good. Hx of diverticulitis/ HTN/ fibromyalgia/ IBS/ spinal stenosis. Hx of bone scan / spine imaging. Has lymphedema and uses pump. No cancer recurrence. Pt works as . From [place] and moved back here.    No fevers/ chills/ chest pain/ SOB/ nausea/ vomiting/diarrhea/         Past Medical History:   Diagnosis Date    Arrhythmia     MURMUR    Asthma     AS A CHILD    Breast cancer ([address] 07/21/2016    LEFT    Diverticulitis     Diverticulosis     Esophageal spasm     Fibromyalgia     GERD (gastroesophageal reflux disease)     Heart murmur     History of chemotherapy 01/11/1977    Completed    Hypertension     IBS (irritable bowel syndrome)     Ill-defined condition     LYMPHEDEMA LEFT ARM    Lymphedema     LEFT ARM    Morbid obesity (HCC)     Nausea & vomiting     Osteoarthritis     PUD (peptic ulcer disease)     YEARS AGO    S/P radiation therapy 04/12/2017    Completed    Thalassemia       Past Surgical History:   Procedure Laterality Date    HX BARIATRIC SURGERY  10/2010    lap band    HX BREAST LUMPECTOMY Left 07/2016    CHEMO AND RADIATION    HX COLONOSCOPY  2017    HX DILATION AND CURETTAGE  11/2020, 2021    HX GASTRIC BYPASS  10/2010    LAP BAND    HX HYSTERECTOMY      HX KNEE ARTHROSCOPY Bilateral 2008    HX ORTHOPAEDIC  10/2014    right foot and ankle reconstruction for flat feet    HX ORTHOPAEDIC Left 2006    ACHILLES TENDON REPAIR    HX TONSILLECTOMY        Social History     Tobacco Use    Smoking status: Never    Smokeless tobacco: Never   Substance Use Topics    Alcohol use: Not Currently      Family History   Problem Relation Age of Onset    OSTEOARTHRITIS Mother     Thalassemia Mother     Hypertension Mother     Heart Disease Father         chf    Cancer Father         lung    Hypertension Father     Arthritis Father     Heart Failure Father     Arthritis-rheumatoid Brother     Hypertension Brother     Hypertension Brother     Elevated Lipids Brother     OSTEOARTHRITIS Brother     OSTEOARTHRITIS Sister     Other Brother         SARCOIDOSIS    Anesth Problems Neg Hx      Current Outpatient Medications   Medication Sig    desonide (TRIDESILON) 0.05 % cream APPLY A SMALL AMOUNT TO AFFECTED AREA TWICE A DAY AS NEEDED    Linzess 290 mcg cap capsule TAKE 1 CAPSULE EVERY MORNING 30 MIN. BEFORE FIRST MEAL    budesonide (RHINOCORT AQUA) 32 mcg/actuation nasal spray 2 Sprays by Both Nostrils route daily. losartan-hydroCHLOROthiazide (HYZAAR) 100-12.5 mg per tablet TAKE 1 TABLET BY MOUTH EVERY DAY    DULoxetine (CYMBALTA) 60 mg capsule TAKE 1 CAPSULE BY MOUTH EVERY DAY    dilTIAZem ER (CARDIZEM CD) 180 mg capsule TAKE 1 CAPSULE BY MOUTH EVERY DAY    aspirin-acetaminophen-caffeine (Excedrin Extra Strength) 250-250-65 mg per tablet 2 Tablets two (2) times a day. propylene glycoL (Systane Balance) 0.6 % drop Apply 1 Drop to eye two (2) times daily as needed. biotin 10,000 mcg cap Take 1 Tablet by mouth daily. cholecalciferol (VITAMIN D3) (5000 Units/125 mcg) tab tablet Take 5,000 Units by mouth daily. cyanocobalamin 1,000 mcg tablet Take 100 mcg by mouth daily.     folic acid 702 mcg tablet Take 400 mcg by mouth daily. multivitamin capsule Take 1 Capsule by mouth daily. methocarbamoL (ROBAXIN) 500 mg tablet Take 1 Tablet by mouth four (4) times daily. As needed for muscle spasms     No current facility-administered medications for this visit. Allergies   Allergen Reactions    Other Food Sneezing    Adhesive Other (comments)     Tears skin off. Severe. Oxycodone Itching    Lisinopril Other (comments)     \"constant tickle in my throat\"  \"constant tickle in my throat\"      Mold Other (comments)    Nickel Other (comments)    Sulfa (Sulfonamide Antibiotics) Other (comments)    Sulfamethoxazole-Trimethoprim Hives    Anesthetics - Amide Type - Select Amino Amides Nausea Only        A complete review of systems was obtained, negative except as described above and as reported on ROS sheet scanned into system. Physical Exam:     Visit Vitals  /84 (BP 1 Location: Right arm, BP Patient Position: Sitting)   Temp (!) 96.2 °F (35.7 °C) (Temporal)   Ht 5' 9.5\" (1.765 m)   Wt 333 lb 6.4 oz (151.2 kg)   LMP 11/30/2021 (Approximate)   SpO2 98%   BMI 48.53 kg/m²     ECOG PS: 0  General: No distress  Eyes: PERRLA, anicteric sclerae  HENT: Atraumatic, wearing mask  Neck: Supple  Respiratory: CTAB, normal respiratory effort  CV: Normal rate, regular rhythm  GI: Soft, nontender, nondistended, no masses  MS: Normal gait and station. Digits without clubbing or cyanosis. Skin: No rashes, ecchymoses, or petechiae. Normal temperature, turgor, and texture. Psych: Alert, oriented, appropriate affect, normal judgment/insight  Neuro non focal  Breast LEFT lumpectomy scar, no masses palpable b/l     Results:     Lab Results   Component Value Date/Time    WBC 8.7 02/03/2022 09:51 AM    HGB 9.8 (L) 02/03/2022 09:51 AM    HCT 31.3 (L) 02/03/2022 09:51 AM    PLATELET 383 26/97/1912 09:51 AM    MCV 87.7 02/03/2022 09:51 AM    ABS.  NEUTROPHILS 5.7 02/03/2022 09:51 AM     Lab Results   Component Value Date/Time    Sodium 139 02/03/2022 09:51 AM    Potassium 3.8 02/03/2022 09:51 AM    Chloride 109 (H) 02/03/2022 09:51 AM    CO2 27 02/03/2022 09:51 AM    Glucose 103 (H) 02/03/2022 09:51 AM    BUN 10 02/03/2022 09:51 AM    Creatinine 0.67 02/03/2022 09:51 AM    GFR est AA >60 02/03/2022 09:51 AM    GFR est non-AA >60 02/03/2022 09:51 AM    Calcium 9.0 02/03/2022 09:51 AM     Lab Results   Component Value Date/Time    Bilirubin, total 0.4 02/03/2022 09:51 AM    ALT (SGPT) 20 02/03/2022 09:51 AM    Alk. phosphatase 132 (H) 02/03/2022 09:51 AM    Protein, total 7.2 02/03/2022 09:51 AM    Albumin 3.5 02/03/2022 09:51 AM    Globulin 3.7 02/03/2022 09:51 AM       Records reviewed and summarized above. Pathology report(s) reviewed above. Radiology report(s) reviewed above. Assessment:/PLAN     1)  Reported stage 3 LEFT breast cancer triple negative dx in 2016  hx of lump/ chemo AC-T/ radiation in [place] [facility]   No records from [place] [facility].   Dx in 2016. Seen today for 6 mo.   6 years out. Not on any therapy now. Doing well without cancer recurrence. Last mammo 1/22 with fat necrosis. Ordered for this year. Discussed MRI and can do anytime. Pt wants to do. Ordered. Labs and routine HM with PCP. Will continue course of surveillance. 2) post menopausal.  Hx of ANABEL. Path negative. 3) Hx of diverticulitis/ HTN/ fibromyalgia/ IBS/ spinal stenosis. Per PCP. 4) hot flashes. Stable. 5) lymphedema.  / arm wrap. Per LE clinic      6) hx thalassemia. Hx of anemia. Labs ordered from PCP     7) Psychosocial.  Mood good. Coping well. Works as an . Just moved back to [facility]. Has family support here. SW support today. Fu here in 12  months  Call if questions    I appreciate the opportunity to participate in Ms. Sergio Mccoy's care.     Signed By: Sara Farias,

## 2022-10-27 NOTE — PROGRESS NOTES
Cris Smith is a 47 y.o. female  Chief Complaint   Patient presents with    Follow-up    Breast Cancer     1. Have you been to the ER, urgent care clinic since your last visit? Hospitalized since your last visit? Yes, patient went to the ER for her back pain and was given Flexeril/Percocet the first visit and then 2nd visit they gave her Toradol & Robaxin. (August 2022)    2. Have you seen or consulted any other health care providers outside of the [address] [place] [address] since your last visit? Include any pap smears or colon screening. Yes, patient went to see her Ramon at [facility], GI Provider. Patient states he has been having serious back pain and is seeing Caren Mccann for this and getting shots on her back on the 15 of November 2022.

## 2022-11-02 RX ORDER — DILTIAZEM HYDROCHLORIDE 180 MG/1
180 CAPSULE, COATED, EXTENDED RELEASE ORAL DAILY
Qty: 90 CAPSULE | Refills: 3 | Status: SHIPPED | OUTPATIENT
Start: 2022-11-02

## 2022-11-02 RX ORDER — DULOXETIN HYDROCHLORIDE 60 MG/1
CAPSULE, DELAYED RELEASE ORAL
Qty: 90 CAPSULE | Refills: 3 | Status: SHIPPED | OUTPATIENT
Start: 2022-11-02

## 2022-11-02 RX ORDER — LOSARTAN POTASSIUM AND HYDROCHLOROTHIAZIDE 12.5; 1 MG/1; MG/1
TABLET ORAL
Qty: 90 TABLET | Refills: 3 | Status: SHIPPED | OUTPATIENT
Start: 2022-11-02

## 2022-11-03 ENCOUNTER — TELEPHONE (OUTPATIENT)
Dept: INTERNAL MEDICINE CLINIC | Age: 52
End: 2022-11-03

## 2022-11-03 NOTE — TELEPHONE ENCOUNTER
Pharmacy Comments: We received allergy info for our mutal pt transmitted from your electronic prescribing software. The statement indicates \"other food\" and we are unsure of the intent. Please reply with the intent of the statement or the complete patient allergy list to ensure we have a complete allergy profile.      Medication:  DILTIAZEM ER CAPS 180MG

## 2022-11-13 ENCOUNTER — TRANSCRIBE ORDER (OUTPATIENT)
Dept: SCHEDULING | Age: 52
End: 2022-11-13

## 2022-11-13 DIAGNOSIS — R13.19 ESOPHAGEAL DYSPHAGIA: Primary | ICD-10-CM

## 2022-11-17 ENCOUNTER — HOSPITAL ENCOUNTER (OUTPATIENT)
Dept: PHYSICAL THERAPY | Age: 52
Discharge: HOME | End: 2022-11-17
Payer: COMMERCIAL

## 2022-11-17 VITALS — HEIGHT: 70 IN | WEIGHT: 293 LBS | BODY MASS INDEX: 41.95 KG/M2

## 2022-11-17 PROCEDURE — 97140 MANUAL THERAPY 1/> REGIONS: CPT

## 2022-11-17 NOTE — PROGRESS NOTES
LYMPHEDEMA PT DAILY TREATMENT NOTE - [facility] 2-15     Patient Name: Usama Hughes  Date: 2022  : 1970  [x]  Patient  Verified  Payor: Shira White / Plan: [employer] HMO / Product Type: HMO /    In time:1330 Out time:1505    Total Treatment Time (min):  95  Total Timed Codes (min):  95     Visit #: 3     Treatment Area: Lymphedema, not elsewhere classified [I89.0]     SUBJECTIVE  Pain Level (0-10 scale): 8/10 in left upper arm and left upper quadrant, currently 3/10 numeric scale in the legs but this increases to 10/10 numeric scale in legs with increased swelling. Leg pain is described as shocking nerve symptoms. Ue pain is described as throbbing, sharp and shooting and it is constant. Patient continues with back pain, but it has improved. Any medication changes, allergies to medications, adverse drug reactions, diagnosis change, or new procedure performed?: [x] No    [] Yes (see summary sheet for update)  Subjective functional status/changes:   [x] No changes reported    Patient has spoken with vendor/insurance regarding her compression garment order and they were supposed to call her with an update but have not done so. Patient reports that she is using her basic vasopneumatic compression pump (Lymphapress), but is not having any improvement with regular use and it does not address her legs. Patient reports improved back pain after injection. She reports pain with cording with new areas of pain in the anterior chest wall and lateral to scapula. Could that be from cording too? OBJECTIVE  5 min Therapeutic Exercise:  [] Barb Lama Exercises [] Remedial Lymphedema Exercise Program [x] Axillary Web Exercise Program- reviewed and encouraged home performance       [] Shoulder ROM Exercises   Rationale: Activate muscle pump to improve lymphatic fluid movement and decrease swelling to improve the patients ability to perform ADL and IADL skills and prevent worsening of swelling over time. 90 min Manual Therapy     Kinesiotaping: Deferred due to skin sensitivity in the past with taping she reports. Manual Lymphatic Drainage (MLD):  Area to decongest: UE: left and upper quadrant     Patient also with B LE involvement as well. Sequence used and effectiveness: Modifications were made to manual lymph drainage sequence to exclude cervical techniques secondary to patient's age. 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 Axillary web cording is extensive in the trunk and medial upper arm but does not extend distally to the forearm. Performed space correction techniques, traction techniques with dicem, and twisty mobilization with active elbow flexion and extension. Reviewed self soft tissue mobilizations. Patient was able to lie in both supine and right sidelying today. Skin/wound care/debridement: Reviewed skin care principles:   Skin care products  Hygiene  Prevention of cellulitis    Skin is intact bilateral LEs and left UE. Hyperpigmentation is present in the lower extremities and left upper quadrant. Applied multi-layer compression bandaging to: Deferred. Upper/Lower Extremity Compression:   Custom Medi left UE compression garments have been requested with [employer] but order has been delayed due to insurance has not reimbursed vendor for products ordered earlier in the year. Therapist emailed [facility] requesting a status update today. Patient is currently wearing left arm compression garments that her in good condition. She wears lower extremity compression knee highs that appear to be Juzo dynamic with silicone band in class 2 but she reports issues with sliding and binding with increasing swelling in the garment during the day.   Discussed garment options and she would benefit from custom flat-knit daytime compression on the lower extremities. She will be measured for products when order issue is resolved with [employer].  Discussed also the possibility of switching her order to a different vendor ([address] [place] [address]) who would be in network with her insurance if these problems persist.    Patient has old Tribute compression garments that she wears on her lower legs currently. Discussed lower leg swelling concerns and the possibility of bandaging on top of the Tributes to achieve decongestion. Patient is interested in learning how to self bandage with the Tribute garments. Rationale: decrease pain, increase ROM, increase tissue extensibility, decrease edema , and increase postural awareness to improve the patients ability to prevent worsening of swelling and axillary cord symptoms over time. 0    Vasopneumatic Device:    Patient has an older Lymphapress basic pump that she uses that is not working effectively for her. Would benefit from an advanced vasopneumatic device when eligible for both left upper extremity and bilateral lower extremities   Rationale: To improve lymphatic fluid movement and decrease swelling to improve the patients ability to perform ADL and IADL skills and prevent worsening of swelling over time. With   [x] TE   [] TA   [x] MT   [] SC   [x] other: Patient Education: [x] Review HEP and home program  [x] MLD Patient Education Reviewed with patient, as well as demonstration and instruction during MLD portion of the session. Reviewed soft tissue mobilization for axillary web. Dycem provided.    [x] Progressed/Changed HEP based on:   [x] positioning   [] Kinesiotape   [x] Skin care   [] wound care   [x] other: Garment ordering process/vasopneumatic pump upgrade options  Compression garment options for lower extremity  Compression bandaging/garment precautions reviewed: [x] Yes  [] No      Other Objective/Functional Measures:      Function:  patient reports walking tolerance of 30 minutes and a standing tolerance of 15 minutes secondary to pain in lower extremities, she is independent with all self care including dressing and bathing  Left UE range of motion is [facility]     Reviewed results of volumetric measurements taken on evaluation. -right lower 13,157.34 ml today    -left lower  12,755.10 ml today   Percent difference today is 3.06% right larger than left       Pain Level (0-10 scale) post treatment:  8/10 numeric scale left UE and trunk, 3/10 bilateral legs     ASSESSMENT/Changes in Function:   Patient returns today for reassessment after delays in care due to acute onset of back pain that required other medical care. She has not been seen in therapy since 8/25/2022. Completed assessment of lower extremity swelling today and established a new plan of care. Axillary web cording persists and is painful in the anterior, posterior and lateral chest region as well as in left upper arm. Range of motion is not currently limited. Patient compression garment order has not yet been completed by vendor due to delays by insurance in reimbursing for products ordered earlier in the year. Therapist has requested an update from vendor and she will need to be measured for new lower extremity compression as well once this issue is resolved. Resumed soft tissue mobilizations for axillary web cording which is extensive today. Patient will continue to work on the exercises/stretches as tolerated and will wear her current  compression products daily. Patient has a basic vasopneumatic device and [facility] has been contacted to determine if patient is eligible for a a Flexitouch for left upper and bilateral lower extremities currently.      Patient will continue to benefit from skilled PT services to  address swelling, analyze and address soft tissue restrictions, analyze compression product fit and use, assess and modify postural abnormalities, instruct in home lymphedema management program, measure for compression products, and modify and progress therapeutic interventions to attain remaining goals. [x]  See Plan of Care  [x]  See progress note/recertification  []  See Discharge Summary         Progress towards goals / Updated goals:  Short Term Goals:  1. Patient will demonstrate knowledge of signs/symptoms of infections/cellulitis and be independent in skin care to prevent cellulitis. In progress. 2.  Patient will demonstrate independence in lymphedema home program of therapeutic exercises to improve circulation and decongest limb to improve ADLs. In progress. 3.  Patient will receive appropriately fitting custom compression garments to prevent worsening of swelling over time. In progress. 4.  Patient will be independent in axillary web cording self soft tissue mobilizations. met     Long Term Goals[de-identified]  1.  Pt will show improvement in the Lymphedema Life Impact Scale by decreasing the score to 20% and thus allow improvement in patient's quality of life. In progress. 2.  Patient will be independent with don/doff of compression system and use in order to prevent reaccumulation of fluid at discharge. In progress. 3.  Pt will be independent in self-MLD and be pain free with functional mobility for transition to independent restorative phase of lymphedema therapy. In progress.      PLAN  []  Upgrade activities as tolerated     [x]  Update plan of care with new and modified foals  []  Update interventions per flow sheet       []  Discharge due to:_  []  Other:_        VISHNU Ramos, KELLEY-YAQUELIN   11/17/2022

## 2022-11-17 NOTE — PROGRESS NOTES
[facility]  [address], [address], [place] [address]    Continued Plan of Care/ Re-certification for Physical or Occupational Therapy Services    [address] [facility] [address]   Provider # [id number]                    Diagnosis: Lymphedema, not elsewhere classified [I89.0]  Onset Date:  referral 10/20/21  Prior Hospitalization: none noted     Visits from Start of Care: 3     Missed Visits: 6  Start of Care: 8/8/2022            Prior Level of Function:  Independent gait without any assistive device for community distances, modified independent with self care and bathing      The Plan of Care and following information is based on the patient's current status:  Short Term Goals: to be met in 6 treatments  1. Patient will demonstrate knowledge of signs/symptoms of infections/cellulitis and be independent in skin care to prevent cellulitis. In progress. Status at last note/certification:Initiated  Status at progress note: not met, in progress  2. Patient will demonstrate independence in lymphedema home program of therapeutic exercises to improve circulation and decongest limb to improve ADLs. In progress. Status at last note/certification:Initiated  Status at progress note: not met, in progress  3. Patient will receive appropriately fitting custom compression garments to prevent worsening of swelling over time. In progress. Status at last note/certification:Initiated  Status at progress note: not met, in progress     4. Patient will be independent in axillary web cording self soft tissue mobilizations. In progress. Status at last note/certification:Initiated  Status at progress note: met     Long Term Goals[de-identified] 12 treatments  1. Pt will show improvement in the Lymphedema Life Impact Scale by decreasing the score to 20% and thus allow improvement in patient's quality of life. In progress.   Status at last note/certification:Initiated  Status at progress note: not met  2. Patient will be independent with don/doff of compression system and use in order to prevent reaccumulation of fluid at discharge. In progress. Status at last note/certification:Initiated  Status at progress note: not met  3. Pt will be independent in self-MLD for UE and LE and be pain free with UE functional mobility for transition to independent restorative phase of lymphedema therapy. In progress  Status at last note/certification:Initiated  Status at progress note: not methe Plan of Care and following information is based on the patient's current status:      Key functional changes: patient has had back problems that limited therapy attendance. She is now returning to therapy for care. Axillary web syndrome persists with pain. Patient has been unable to receive new compression garments yet due to delays in insurance reimbursement for previous orders. Patient requires new compression garments for left UE and bilateral lower extremity lymphedema. Problems/ barriers to goal attainment: insurance and vendor delays for obtaining compression garments, acute back pain that resulted in patient not being able to come to therapy for over 2 months     Problem List: pain affecting function, edema affecting function, decrease ADL/ functional abilitiies, and other patient requires compression garments to prevent worsening of UE and LE swelling. Treatment Plan: Therapeutic exercise, Manual therapy, Self care/home management, Vasopneumatic device, and Other: compression bandaging, measuring and fitting for compression garments      Patient Goal (s) has been updated and includes: time frame extension to all goals and addition of lower extremity goals    Add Short term goal 5:  Therapist will investigate patient's eligibility timeframe for a new vasopneumatic device to address swelling in left UE and bilateral lower extremities.   To be met in 6 visits      Goals for this certification period to be accomplished in 12 treatments:       Frequency / Duration: Patient to be seen 2 times per week for 4-6 weeks followed by 1 visit every 1-2 weeks for 6-8 weeks for a total of 12 treatments. Assessment / Recommendations:  ASSESSMENT/Changes in Function:   Patient returns today for reassessment after delays in care due to acute onset of back pain that required other medical care. She has not been seen in therapy since 8/25/2022. Completed assessment of lower extremity swelling today and established a new plan of care. Axillary web cording persists and is painful in the anterior, posterior and lateral chest region as well as in left upper arm. Range of motion is not currently limited. Patient compression garment order has not yet been completed by vendor due to delays by insurance in reimbursing for products ordered earlier in the year. Therapist has requested an update from vendor and she will need to be measured for new lower extremity compression as well once this issue is resolved. Resumed soft tissue mobilizations for axillary web cording which is extensive today. Patient will continue to work on the exercises/stretches as tolerated and will wear her current  compression products daily. Patient has a basic vasopneumatic device and [facility] has been contacted to determine if patient is eligible for a a Flexitouch for left upper and bilateral lower extremities currently. Patient will continue to benefit from skilled PT services to  address swelling, analyze and address soft tissue restrictions, analyze compression product fit and use, assess and modify postural abnormalities, instruct in home lymphedema management program, measure for compression products, and modify and progress therapeutic interventions to attain remaining goals.     Certification Period: 11/17/2022 to 2/12/2023     VISHNU Ricketts, LAVELLE 11/17/2022    ________________________________________________________________________  I certify that the above Therapy Services are being furnished while the patient is under my care. I agree with the treatment plan and certify that this therapy is necessary. Y or N I have read the above and request that my patient continue as recommended.   Y or N I have read the above report and request that my patient continue therapy with the following changes/special instructions  Y or N I have read the above report and request that my patient be discharged from therapy    Physician's Signature:_________________ Date:___________Time:__________           Anton Holcomb*

## 2022-11-23 ENCOUNTER — HOSPITAL ENCOUNTER (OUTPATIENT)
Dept: PHYSICAL THERAPY | Age: 52
Discharge: HOME | End: 2022-11-23
Payer: COMMERCIAL

## 2022-11-23 VITALS — HEIGHT: 70 IN | BODY MASS INDEX: 41.95 KG/M2 | WEIGHT: 293 LBS

## 2022-11-23 PROCEDURE — 97110 THERAPEUTIC EXERCISES: CPT

## 2022-11-23 PROCEDURE — 97140 MANUAL THERAPY 1/> REGIONS: CPT

## 2022-11-23 NOTE — PROGRESS NOTES
LYMPHEDEMA PT DAILY TREATMENT NOTE - [facility] 2-15     Patient Name: Link Guess  Date: 2022  : 1970  [x]  Patient  Verified  Payor: [employer]b [employer] / Plan: [address] HMO / Product Type: HMO /    In time:7:35 am Out time:9:05 am  Total Treatment Time (min): 90  Total Timed Codes (min):  90     Visit #: 4   Treatment Area: Lymphedema, not elsewhere classified [I89.0]     SUBJECTIVE  Pain Level (0-10 scale): Patient reports R shoulder pain as 3/10, R ankle pain as 3/10 and low back pain at 5/10. Any medication changes, allergies to medications, adverse drug reactions, diagnosis change, or new procedure performed?: [x] No    [] Yes (see summary sheet for update)  Subjective functional status/changes:   [x] No changes reported  Patient has not heard any more about her compression garments so will reach out to vendor on behalf of patient today. Patent also reports using her vaso-pneumatic device at least daily (often more than once) but is not seeing results with use. Patient reports that her tightness continues to be very limiting in L upper quadrant and L UE.      OBJECTIVE  8 min Therapeutic Exercise:  [] Guillermina Grams Exercises [] Remedial Lymphedema Exercise Program [x] Axillary Web Exercise Program (focused on educating on performing stretches that are most beneficial.  Limitations due to back issues and R shoulder pain [] Shoulder ROM Exercises   Rationale: Activate muscle pump to improve lymphatic fluid movement and decrease swelling to improve the patients ability to perform ADL and IADL skills and prevent worsening of swelling over time. 82 min Manual Therapy     Manual Lymphatic Drainage (MLD):  Area to decongest: UE: left and upper quadrant     Patient also with B LE/truncal involvement as well. Sequence used and effectiveness: Modifications were made to manual lymph drainage sequence to exclude cervical techniques secondary to patient's age.     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 Axillary web cording is extensive in the trunk and medial upper arm but does not extend distally to the forearm. P Reviewed self soft tissue mobilizations. Patient was able to lie in both supine and right sidelying today. Skin/wound care/debridement: Reviewed skin care principles:   Skin care products  Hygiene  Prevention of cellulitis    Skin is intact bilateral LEs and left UE. Hyperpigmentation is present in the lower extremities and left upper quadrant. Applied multi-layer compression bandaging to: Deferred. Upper/Lower Extremity Compression:   Patient wore in ready made Sigvaris knee highs today. She has not heard an update about her compression order. Patient wearing her older custom arm garments. Will reach out to [facility] to determine status and how to proceed versus changing vendors if needed. Patient has old Tribute compression garments that she wears on her lower legs currently. Discussed lower leg swelling concerns and the possibility of bandaging on top of the Tributes to achieve decongestion. Patient is interested in learning how to self bandage with the Tribute garments last visit, but did not bring them in today. Rationale: decrease pain, increase ROM, increase tissue extensibility, decrease edema , and increase postural awareness to improve the patients ability to prevent worsening of swelling and axillary cord symptoms over time. With   [x] TE   [] TA   [x] MT   [] SC   [x] other: Patient Education: [x] Review HEP and home program  [x] MLD Patient Education Reviewed with patient, as well as demonstration and instruction during MLD portion of the session.    Reviewed soft tissue mobilization for axillary web.  Radham provided. [x] Progressed/Changed HEP based on:   [x] positioning   [] Kinesiotape   [x] Skin care   [] wound care   [x] other: Garment ordering process/vasopneumatic pump upgrade options  Compression garment options for lower extremity  Compression bandaging/garment precautions reviewed: [x] Yes  [] No      Other Objective/Functional Measures: Will reassess volumes/girths on an upcoming visit. Focus on MLD/ Axillary web cording mobilizations/stretching today. Pain Level (0-10 scale) post treatment: Patient reports R shoulder pain as 3/10, R ankle pain as 3/10 and low back pain at 5/10. ASSESSMENT/Changes in Function:   Patient's main concern at this time is her axillary web cording that is significant and persists and is painful in the anterior, posterior and lateral chest region as well as in left upper arm. Range of motion is not currently limited, which makes her self stretching more difficult. Patient in need of new compression products, but issues with the vendor on coverage has not been resolved and patient has not received and update so clinic will reach out for update today. Patient also will benefit from an upgrade to an advanced vasopneumatic device that can address, trunk, UE, and LEs as her current device has not been effective for patient with regular/consistent use. Will try to set up a demonstration of the Flexitouch Plus with [employer] for upcoming visit. Patient will continue to benefit from skilled PT services to  address swelling, analyze and address soft tissue restrictions, analyze compression product fit and use, assess and modify postural abnormalities, instruct in home lymphedema management program, measure for compression products, and modify and progress therapeutic interventions to attain remaining goals.       []  See Plan of Care  []  See progress note/recertification  []  See Discharge Summary         Progress towards goals / Updated goals:  Short Term Goals: 1. Patient will demonstrate knowledge of signs/symptoms of infections/cellulitis and be independent in skin care to prevent cellulitis. In progress. 2.  Patient will demonstrate independence in lymphedema home program of therapeutic exercises to improve circulation and decongest limb to improve ADLs. In progress. 3.  Patient will receive appropriately fitting custom compression garments to prevent worsening of swelling over time. In progress. 4.  Patient will be independent in axillary web cording self soft tissue mobilizations. met     Long Term Goals:  1. Pt will show improvement in the Lymphedema Life Impact Scale by decreasing the score to 20% and thus allow improvement in patient's quality of life. In progress. 2.  Patient will be independent with don/doff of compression system and use in order to prevent reaccumulation of fluid at discharge. In progress. 3.  Pt will be independent in self-MLD and be pain free with functional mobility for transition to independent restorative phase of lymphedema therapy. In progress.      PLAN  []  Upgrade activities as tolerated     [x]  Update plan of care with new and modified foals  []  Update interventions per flow sheet       []  Discharge due to:_  []  Other:_        Georges Flowers, PTA, CLT 11/23/2022

## 2022-11-28 ENCOUNTER — HOSPITAL ENCOUNTER (OUTPATIENT)
Dept: PHYSICAL THERAPY | Age: 52
Discharge: HOME | End: 2022-11-28
Payer: COMMERCIAL

## 2022-11-28 VITALS — WEIGHT: 293 LBS | HEIGHT: 70 IN | BODY MASS INDEX: 41.95 KG/M2

## 2022-11-28 PROCEDURE — 97140 MANUAL THERAPY 1/> REGIONS: CPT

## 2022-11-28 NOTE — PROGRESS NOTES
LYMPHEDEMA PT DAILY TREATMENT NOTE - [facility] 2-15     Patient Name: Nataliia Coughlin  Date: 2022  : 1970  [x]  Patient  Verified  Payor: Santos Mcdaniel / Plan: Danielle [place] Se HMO / Product Type: HMO /    In time:9:15 am Out time:10:50 am  Total Treatment Time (min): 95  Total Timed Codes (min):  95     Visit #: 5  Treatment Area: Lymphedema, not elsewhere classified [I89.0]     SUBJECTIVE  Pain Level (0-10 scale): Patient reports R shoulder pain as 4/10, R ankle pain as 5/10 and low back pain at 5/10, L axillary cording 5/10. Any medication changes, allergies to medications, adverse drug reactions, diagnosis change, or new procedure performed?: [x] No    [] Yes (see summary sheet for update)  Subjective functional status/changes:   [x] No changes reported  Patient reporting discomfort from axillary web cording and is not able to find ways to stretch easily due to R shoulder pain, back pain and having full range of motion of the L UE. Able to set up an advanced vasopneumatic pump demonstration/treatment for patient on her next visit and discussed options on moving forward with compression garment order and if needed may change vendors so that she can get them in a more timely manner. OBJECTIVE      95  min Manual Therapy     Manual Lymphatic Drainage (MLD):  Area to decongest: UE: left and upper quadrant     Patient also with B LE/truncal involvement as well. Sequence used and effectiveness: Modifications were made to manual lymph drainage sequence to exclude cervical techniques secondary to patient's age. 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 Axillary web cording is extensive in the trunk and medial upper arm but does not extend distally to the forearm.   P Reviewed self soft tissue mobilizations. Patient was able to lie in both supine and right sidelying today. Skin/wound care/debridement: Reviewed skin care principles:   Skin care products  Hygiene  Prevention of cellulitis    Skin is intact bilateral LEs and left UE. Hyperpigmentation is present in the lower extremities and left upper quadrant. Applied multi-layer compression bandaging to: Deferred. Upper/Lower Extremity Compression:   Patient wore in her older custom Medi knee highs today and also brought in her last set of Medi Custom arm sleeve/glove. Clinic reached out to [facility] and working on determining if they can complete patient's order, since there were issues with order sent in August or if we should proceed and use an other vendor [facility].   Patient needs new custom UE, LE, night time garments for LEs, compression bras/vests, swell padding for breast and custom kaity. Will work on securing vendor before next visit if able. Due to end of the year requirements for completing orders for compression garments this may not be able to be done as quickly as planned. Rationale: decrease pain, increase ROM, increase tissue extensibility, decrease edema , and increase postural awareness to improve the patients ability to prevent worsening of swelling and axillary cord symptoms over time. With   [] TE   [] TA   [x] MT   [] SC   [x] other: Patient Education: [x] Review HEP and home program  [x] MLD Patient Education Reviewed with patient, as well as demonstration and instruction during MLD portion of the session. Reviewed soft tissue mobilization for axillary web. Dycem provided.    [x] Progressed/Changed HEP based on:   [x] positioning   [] Kinesiotape   [x] Skin care   [] wound care   [x] other: Garment ordering process/vasopneumatic pump upgrade options  Compression garment options for lower extremity  Compression bandaging/garment precautions reviewed: [x] Yes  [] No      Other Objective/Functional Measures: Will reassess volumes/girths on an upcoming visit. Focus on MLD/ Axillary web cording mobilizations/stretching today. Pain Level (0-10 scale) post treatment: Patient reports R shoulder pain as 3/10, R ankle pain as 3/10, low back pain at 5/10, L UE/axilla pain at 7/10     ASSESSMENT/Changes in Function:   Patient's main concern at this time is her axillary web cording that is significant and persists and is painful in the anterior, posterior and lateral chest region as well as in left upper arm. Range of motion is not currently limited, which makes her self stretching more difficult. Will focus on securing vendor for her compression garment order is able by next visit. Patient also will benefit from an upgrade to an advanced vasopneumatic device (Flexitouch Plus) that can address, trunk, UEs, and LEs as her current device has not been effective for patient with regular/consistent use and a demonstration is set up for her next visit. Patient will continue to benefit from skilled PT services to  address swelling, analyze and address soft tissue restrictions, analyze compression product fit and use, assess and modify postural abnormalities, instruct in home lymphedema management program, measure for compression products, and modify and progress therapeutic interventions to attain remaining goals. []  See Plan of Care  []  See progress note/recertification  []  See Discharge Summary         Progress towards goals / Updated goals:  Short Term Goals:  1. Patient will demonstrate knowledge of signs/symptoms of infections/cellulitis and be independent in skin care to prevent cellulitis. Goal Met 11/28/2022  2. Patient will demonstrate independence in lymphedema home program of therapeutic exercises to improve circulation and decongest limb to improve ADLs. In progress.   3.  Patient will receive appropriately fitting custom compression garments to prevent worsening of swelling over time. In progress. 4.  Patient will be independent in axillary web cording self soft tissue mobilizations. met     Long Term Goals:  1. Pt will show improvement in the Lymphedema Life Impact Scale by decreasing the score to 20% and thus allow improvement in patient's quality of life. In progress. 2.  Patient will be independent with don/doff of compression system and use in order to prevent reaccumulation of fluid at discharge. In progress. 3.  Pt will be independent in self-MLD and be pain free with functional mobility for transition to independent restorative phase of lymphedema therapy. In progress.      PLAN  []  Upgrade activities as tolerated     [x]  Update plan of care with new and modified goals  []  Update interventions per flow sheet       []  Discharge due to:_  []  Other:_        Georges Flowers, PTA, CLT 11/28/2022

## 2022-11-30 ENCOUNTER — HOSPITAL ENCOUNTER (OUTPATIENT)
Dept: GENERAL RADIOLOGY | Age: 52
Discharge: HOME | End: 2022-11-30
Attending: SPECIALIST
Payer: COMMERCIAL

## 2022-11-30 DIAGNOSIS — R13.19 ESOPHAGEAL DYSPHAGIA: ICD-10-CM

## 2022-11-30 PROCEDURE — 74220 X-RAY XM ESOPHAGUS 1CNTRST: CPT

## 2022-12-01 ENCOUNTER — HOSPITAL ENCOUNTER (OUTPATIENT)
Dept: PHYSICAL THERAPY | Age: 52
Discharge: HOME | End: 2022-12-01
Payer: COMMERCIAL

## 2022-12-01 PROCEDURE — 97140 MANUAL THERAPY 1/> REGIONS: CPT

## 2022-12-01 PROCEDURE — 97016 VASOPNEUMATIC DEVICE THERAPY: CPT

## 2022-12-01 NOTE — PROGRESS NOTES
LYMPHEDEMA PT DAILY TREATMENT NOTE - [facility] -15     Patient Name: Cherelle Bullard  Date: 2022  : 1970  [x]  Patient  Verified  Payor: Medina Rcok / Plan: [address] HMO / Product Type: HMO /    In time:9:15 am Out time:11:00 am  Total Treatment Time (min): 105  Total Timed Codes (min):  60     Visit #:6    Treatment Area: Lymphedema, not elsewhere classified [I89.0]     SUBJECTIVE  Pain Level (0-10 scale): Patient reports R shoulder pain as 4/10, R ankle pain as 5/10 and low back pain at 5/10, L axillary cording 5/10. Any medication changes, allergies to medications, adverse drug reactions, diagnosis change, or new procedure performed?: [x] No    [] Yes (see summary sheet for update)  Subjective functional status/changes:   [x] No changes reported  Patient reports that she has been continuing with her axillary web stretching at home, but continues with tightness   60  min Manual Therapy     Manual Lymphatic Drainage (MLD):  Area to decongest: UE: left and upper quadrant     Patient also with B LE/truncal involvement as well. Sequence used and effectiveness: Modifications were made to manual lymph drainage sequence to exclude cervical techniques secondary to patient's age. 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 Axillary web cording is extensive in the trunk and medial upper arm but does not extend distally to the forearm. P Reviewed self soft tissue mobilizations. Skin/wound care/debridement: Reviewed skin care principles:   Skin care products  Hygiene  Prevention of cellulitis    Skin is intact bilateral LEs and left UE. Hyperpigmentation is present in the lower extremities and left upper quadrant. Applied multi-layer compression bandaging to: Deferred.           Upper/Lower Extremity Compression:   Patient wearing L UE upper extremity and lower extremity compression garments daily. Patient in need of new upper and lower compression garments so able to confirm with new vendor [address] that they can assist patient with her current insurance. Order will be sent to vendor for processing. Measured for the following compression products:    UE: left LE: bilateral left upper extremity: Arm sleeve, Glove with open fingers, and Top band silicone border and bilateral lower extremity: Knee high Top band silicone border Open toe Nohemy Night garment  Compression bra/vest and swell padding     Style: Circular knit, Flat-knit, and Foam based    Brand: Medi  Sigvaris  Solaris    Type: Custom: Medi Custom L UE sleeve and glove  CCL2, Medi Custom knee highs CCL2, Custom Sigvaris Nohemy,Custom Tribute Night Garments  lower leg garments. [employer] Compression bra/vest in 2XL and Solaris Breast Swell Pad in large    Vendor: [employer]    Education: Educated patient in compression garment donning and doffing. Glove use with donning. Daily wear schedule. Daily laundering. Garment lifespan. Return and reordering process (by bringing prior garments into the clinic). Educated patient to monitor for redness or pressure points on the skin. If new pain occurs they should contact their therapist.    Patient/family demonstrated donning and doffing with independence with her current products, will reassess donning and doffing of new products when they arrive. Rationale: decrease pain, increase ROM, increase tissue extensibility, decrease edema , and increase postural awareness to improve the patients ability to prevent worsening of swelling and axillary cord symptoms over time.           39   Vasopneumatic Device:   SYMPTOMS:  Patient exhibits the following symptoms despite conservative therapy:    [] Hyperkeratosis       [x] Pain  [] Hyperplasia      [] Genital swelling  [x] Hyperpigmentation [x] Chest/axillary swelling  [x] Scarring                                                                 [] Elephantiasis  [] Papillomatosis       [x] Truncal/abdominal swelling  [x] Recurrent cellulitis      [] Fibrosis      CONSERVATIVE TREATMENT:  Patient has tried conservative treatments Compression garments 20-30mmHg, exercise and elevation:    [x] Yes [] No    If YES, specify type of conservative treatment and duration of use:  Compression garments:    [] <4 weeks   [] 1-5 months   [] 6-12 months  [x] >1 year  Elevation:       [] <4 weeks   [] 1-5 months   [] 6-12 months  [x] >1 year   Exercise:         [] <4 weeks   [] 1-5 months   [] 6-12 months  [x] >1 year   Complete Decongestive Therapy (CDT) [] <4 weeks   [] 1-5 months   [] 6-12 months  [x] >1 year   Instructed on self-manual lymphatic drainage techniques (self-MLD): [x] Yes [] No  Instructed on appropriate skin/nail care practices:    [x] Yes [] No  Evaluation of diet and medications:      [x] Yes [] No    PNEUMATIC COMPRESSION DEVICE EVALUATION:  Patient has tried an  basic pneumatic compression device? [x] Yes [] No    If YES:  basic pneumatic compression device LymphaPress  was used at 40 mmHg for:   [] <4 weeks   [] 1-5 months   [] 6-12 months  [x] >1 year   Was the  basic pneumatic compression device effective in managing the patient's lymphedema? [] Yes [x] No  Patient uses her Lymphapress basic vasopneumatic device twice daily as she was instructed on receiving product and she has not had clinical improvement in her swelling or symptoms with consistent use. Patient has no improvement in her symptoms and it has only exacerbated swelling in upper arm, axilla, chest/trunk and has not been ideal for treating her chronic left upper quadrant/L UE swelling.      Is an  advanced pneumatic compression device medically necessary to treat the clinical symptoms listed above that prevent effective treatment with the  basic pneumatic compression device? [x] Yes [] No  Patient will benefit from an upgrade to an advanced vasopneumatic device (Flexitouch Plus) that can address, L upper quadrant, L UE, trunk as well as B LEs. Patient has a history of L breast cancer (7/2016) followed by chemotherapy and radiation. Patient had reported significant skin injury with radiation. Patient began CDT lymphedema treatments in [place] around this time and was treated for her L UE and truncal lymphedema and started with custom L UE compression garments, compression bras  and a basic lymphapress vasopneumatic pump  ~5 years. Despite having these items and conservative treatment and use of basic device she developed cellulitis on her trunk in 2018. Patient has also struggles with B LE chronic swelling. Patient with history of B knee arthroscopy 2008, Left achilles tendon repair (2006), Right foot and ankle reconstruction for flat feet,bariatric surgery (lap band) 2010, and most recently a hysterectomy in February 2022 followed by infection. Patient was able to feel the benefit of the advanced device today with good results in reduction with demonstration and would have best long term outcomes with use of this style of vasopneumatic device. MEASUREMENTS:  Affected Side: L UE/Chest/Trunk/Hips Pre and Post Flexitouch Plus    Left (cm)   Base 3rd finger 7.8  7.5   Palm 24 (base of fingers 21.4)  23.7 (base of fingers 21.3)   Wrist 19.2  18.6   Mid Forearm 34.8  34   Elbow 35.2  35   Axilla 49.2 (47.8 mid bicep)  48.5 (mid bicep-47)   Trunk-Axilla  122.5  118.5   Chest 137  132.3   Xiphoid 123  117.8   Waist 138  134.5   Hips 148  149        Patient also has been utilizing B lower extremity compression and would benefit from use of vasopneumatic device for home use to address as well. Affected Side: B LE today vasopneumatic device demonstration was done on the R LE. See below for pre and post Flexitouch Plus measurements.     Right (cm)   5th Tuberosity 26  24.5   Ankle 30.8  29.6   Calf 44  43.8   Mid Knee 50.5  48.8   Thigh 72.2  71.8   Groin 87  87         Rationale: To improve lymphatic fluid movement and decrease swelling to improve the patients ability to perform ADL and IADL skills and prevent worsening of swelling over time. With   [] TE   [] TA   [x] MT   [] SC   [x] other: Patient Education: [x] Review HEP and home program  [x] MLD Patient Education Reviewed with patient, as well as demonstration and instruction during MLD portion of the session. Reviewed soft tissue mobilization for axillary web. Dycem provided. [x] Progressed/Changed HEP based on:   [x] positioning   [] Kinesiotape   [x] Skin care   [] wound care   [x] other: Garment ordering process/advanced vasopneumatic pump  Compression garment options for lower extremity  Compression bandaging/garment precautions reviewed: [x] Yes  [] No      Other Objective/Functional Measures:   See measurements in vasopneumatic device section of note for details. Pain Level (0-10 scale) post treatment: Patient reports R shoulder pain as 3/10, R ankle pain as 3/10, low back pain at 4/10, L UE/axilla pain at 4/10     ASSESSMENT/Changes in Function:   Patient was able to have advanced vasopneumatic pump demonstration/treatment today with [employer] . Patient has had a basic vasopneumatic device (Lymphapress) that has not been effective at improving her symptoms or swelling and wanted to see other options for improving her home program to better manage her swelling. Patient was able to feel the benefits of the advanced device and would be an excellent candidate from this device in the home setting during the restorative phase of care.  Patient has had some difficulty getting her compression garment orders completed in a timely manner so able to find new vendor for patient [facility] [employer]  that works with her insurance provider and will be able to assist her with her next order that was submitted today. Patient will return next week for continued focus on L upper quadrant axillary web cording as she still has significant cording in L upper quadrant/ L UE. Patient will continue to benefit from skilled PT services to  address swelling, analyze and address soft tissue restrictions, analyze compression product fit and use, assess and modify postural abnormalities, instruct in home lymphedema management program, assess fit and effectiveness of compression products, and modify and progress therapeutic interventions to attain remaining goals. []  See Plan of Care  []  See progress note/recertification  []  See Discharge Summary         Progress towards goals / Updated goals:  Short Term Goals:  1. Patient will demonstrate knowledge of signs/symptoms of infections/cellulitis and be independent in skin care to prevent cellulitis. Goal Met 11/28/2022  2. Patient will demonstrate independence in lymphedema home program of therapeutic exercises to improve circulation and decongest limb to improve ADLs. In progress. 3.  Patient will receive appropriately fitting custom compression garments to prevent worsening of swelling over time. In progress. 4.  Patient will be independent in axillary web cording self soft tissue mobilizations. met     Long Term Goals:  1. Pt will show improvement in the Lymphedema Life Impact Scale by decreasing the score to 20% and thus allow improvement in patient's quality of life. In progress. 2.  Patient will be independent with don/doff of compression system and use in order to prevent reaccumulation of fluid at discharge. In progress. 3.  Pt will be independent in self-MLD and be pain free with functional mobility for transition to independent restorative phase of lymphedema therapy. In progress.      PLAN  []  Upgrade activities as tolerated     [x]  Updated Plan of Care  []  Update interventions per flow sheet       []  Discharge due to:_  []  Other:_        Georges Flowers, BERTHA, CLT 12/01/2022

## 2022-12-05 ENCOUNTER — HOSPITAL ENCOUNTER (OUTPATIENT)
Dept: PHYSICAL THERAPY | Age: 52
Discharge: HOME | End: 2022-12-05
Payer: COMMERCIAL

## 2022-12-05 PROCEDURE — 97140 MANUAL THERAPY 1/> REGIONS: CPT

## 2022-12-05 PROCEDURE — 97110 THERAPEUTIC EXERCISES: CPT

## 2022-12-05 NOTE — PROGRESS NOTES
LYMPHEDEMA PT DAILY TREATMENT NOTE - [facility] 2-15     Patient Name: Jaimie Coto  Date: 2022  : 1970  [x]  Patient  Verified  Payor: Inderjit Bhakta / Plan: [address] HMO / Product Type: HMO /    In time:9:20 am Out time:10:55 am  Total Treatment Time (min): 95  Total Timed Codes (min):95     Visit #: 7    Treatment Area: Lymphedema, not elsewhere classified [I89.0]     SUBJECTIVE  Pain Level (0-10 scale): Patient reports R shoulder tingling not so much pain today, R ankle pain as 5/10 and low back pain at 6/10 (lifted something over the weekend that was heavy causing some increased pain), L axillary cording not as bad today she reports as it feels looser 3/10. Any medication changes, allergies to medications, adverse drug reactions, diagnosis change, or new procedure performed?: [x] No    [x] Yes (see summary sheet for update)  Subjective functional status/changes:   [x] No changes reported  Patient reports that she has been continuing with her axillary web stretching at home, but continues with tightness in axilla. 13 min Therapeutic Exercise:  [] Eileen Pulse Exercises [] Remedial Lymphedema Exercise Program [x] Axillary Web Exercise Program      [] Shoulder ROM Exercises   Rationale: Activate muscle pump to improve lymphatic fluid movement and decrease swelling to improve the patients ability to perform ADL and IADL skills and prevent worsening of swelling over time. 82  min Manual Therapy     Manual Lymphatic Drainage (MLD):  Area to decongest: UE: left and upper quadrant     Patient also with B LE/truncal involvement as well. Sequence used and effectiveness: Modifications were made to manual lymph drainage sequence to exclude cervical techniques secondary to patient's age.     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 Axillary web cording is extensive in the trunk and medial upper arm but does not extend distally to the forearm. Reviewed self soft tissue mobilizations. Skin/wound care/debridement: Reviewed skin care principles:   Skin care products  Hygiene  Prevention of cellulitis    Skin is intact bilateral LEs and left UE. Hyperpigmentation is present in the lower extremities and left upper quadrant. Applied multi-layer compression bandaging to: Deferred. Upper/Lower Extremity Compression:     Measured for the following compression products on previous visit:    UE: left LE: bilateral left upper extremity: Arm sleeve, Glove with open fingers, and Top band silicone border and bilateral lower extremity: Knee high Top band silicone border Open toe Nohemy Night garment    Style: Circular knit, Flat-knit, and Foam based    Brand: Medi  Sigvaris  Solaris    Type: Custom: Medi Custom L UE sleeve and glove  CCL2, Medi Custom knee highs CCL2, Custom Sigvaris Nohemy,Custom Tribute Night Garments  lower leg garments. Vendor: [employer]    Education: Educated patient in compression garment donning and doffing. Glove use with donning. Daily wear schedule. Daily laundering. Garment lifespan. Return and reordering process (by bringing prior garments into the clinic). Educated patient to monitor for redness or pressure points on the skin. If new pain occurs they should contact their therapist.    Patient/family demonstrated donning and doffing with independence with her current products, will reassess donning and doffing of new products when they arrive. Rationale: decrease pain, increase ROM, increase tissue extensibility, decrease edema , and increase postural awareness to improve the patients ability to prevent worsening of swelling and axillary cord symptoms over time.                                                               With   [] TE   [] TA   [x] MT   [] SC   [x] other: Patient Education: [x] Review HEP and home program  [x] MLD Patient Education Reviewed with patient, as well as demonstration and instruction during MLD portion of the session. Reviewed soft tissue mobilization for axillary web. Dycem provided. [x] Progressed/Changed HEP based on:   [x] positioning   [] Kinesiotape   [x] Skin care   [] wound care   [x] other: Garment ordering process/advanced vasopneumatic pump  Compression garment options for lower extremity  Compression bandaging/garment precautions reviewed: [x] Yes  [] No      Other Objective/Functional Measures:   Reviewed results of volumetric measurements taken on evaluation. Upper Extremities:  -left upper 6,416. 59 ml today compared to 6,025.19 ml on 8/08/2022.     -right upper  5,949.73 ml today compared to 5,891.80 ml on 8/08/2022. Percent difference today is 7.85% as compared to 2.26% on evaluation. Lower Extremities  -left lower 13,806.75 ml today compared to 12,755.10 ml on 11/17/2022.     -right lower  13,945.37 ml today compared to 13,157.34 ml on 11/17/2022. Percent difference today is -0.99% as compared to -3.06 % on evaluation. Pain Level (0-10 scale) post treatment: Patient reports R shoulder pain as 3/10, R ankle pain as 3/10, low back pain at 4/10, L UE/axilla pain at 4/10     ASSESSMENT/Changes in Function:   Patient is having some improvement in her axillary web tightness, but continues with symptoms and continues with swelling. Order for compression as been submitted and in process of trying to upgrade patient to an advanced vasopneumatic device since her current device has not been effective at improving her swelling.  Patient continues to be very motivated to improve her condition and is complaint with home program.   Patient will continue to benefit from skilled PT services to  address swelling, analyze and address soft tissue restrictions, analyze compression product fit and use, assess and modify postural abnormalities, instruct in home lymphedema management program, assess fit and effectiveness of compression products, and modify and progress therapeutic interventions to attain remaining goals. []  See Plan of Care  []  See progress note/recertification  []  See Discharge Summary         Progress towards goals / Updated goals:  Short Term Goals:  1. Patient will demonstrate knowledge of signs/symptoms of infections/cellulitis and be independent in skin care to prevent cellulitis. Goal Met 11/28/2022  2. Patient will demonstrate independence in lymphedema home program of therapeutic exercises to improve circulation and decongest limb to improve ADLs. In progress. 3.  Patient will receive appropriately fitting custom compression garments to prevent worsening of swelling over time. In progress. 4.  Patient will be independent in axillary web cording self soft tissue mobilizations. met     Long Term Goals:  1. Pt will show improvement in the Lymphedema Life Impact Scale by decreasing the score to 20% and thus allow improvement in patient's quality of life. In progress. 2.  Patient will be independent with don/doff of compression system and use in order to prevent reaccumulation of fluid at discharge. In progress. 3.  Pt will be independent in self-MLD and be pain free with functional mobility for transition to independent restorative phase of lymphedema therapy. In progress.      PLAN  []  Upgrade activities as tolerated     [x]  Updated Plan of Care  []  Update interventions per flow sheet       []  Discharge due to:_  []  Other:_        Georges Flowers, BERTHA, CLT 12/05/2022

## 2022-12-06 ENCOUNTER — HOSPITAL ENCOUNTER (OUTPATIENT)
Dept: MRI IMAGING | Age: 52
Discharge: HOME | End: 2022-12-06
Attending: INTERNAL MEDICINE
Payer: COMMERCIAL

## 2022-12-06 DIAGNOSIS — Z98.890 HISTORY OF LUMPECTOMY OF LEFT BREAST: ICD-10-CM

## 2022-12-06 DIAGNOSIS — I10 PRIMARY HYPERTENSION: ICD-10-CM

## 2022-12-06 DIAGNOSIS — M79.7 FIBROMYALGIA: ICD-10-CM

## 2022-12-06 DIAGNOSIS — Z85.3 HISTORY OF BREAST CANCER: ICD-10-CM

## 2022-12-06 PROCEDURE — A9576 INJ PROHANCE MULTIPACK: HCPCS

## 2022-12-06 PROCEDURE — 77049 MRI BREAST C-+ W/CAD BI: CPT

## 2022-12-06 PROCEDURE — 74011000258 HC RX REV CODE- 258: Performed by: INTERNAL MEDICINE

## 2022-12-06 PROCEDURE — 74011250636 HC RX REV CODE- 250/636

## 2022-12-06 PROCEDURE — 74011000250 HC RX REV CODE- 250: Performed by: INTERNAL MEDICINE

## 2022-12-06 RX ORDER — SODIUM CHLORIDE 0.9 % (FLUSH) 0.9 %
10 SYRINGE (ML) INJECTION ONCE
Status: COMPLETED | OUTPATIENT
Start: 2022-12-06 | End: 2022-12-06

## 2022-12-06 RX ADMIN — GADOTERIDOL 20 ML: 279.3 INJECTION, SOLUTION INTRAVENOUS at 13:23

## 2022-12-06 RX ADMIN — SODIUM CHLORIDE 100 ML: 9 INJECTION, SOLUTION INTRAVENOUS at 13:24

## 2022-12-06 RX ADMIN — SODIUM CHLORIDE, PRESERVATIVE FREE 10 ML: 5 INJECTION INTRAVENOUS at 14:00

## 2022-12-07 VITALS — WEIGHT: 293 LBS | BODY MASS INDEX: 41.95 KG/M2 | HEIGHT: 70 IN

## 2022-12-07 NOTE — PROGRESS NOTES
GELACIO Verified. Called pt on mobile phone number listed. Patient was made aware of most recent Breast MRI being good per Provider reading and advised to proceed with mammogram she has scheduled for 1/2023! She was appreciative over call.   Ave López

## 2022-12-08 ENCOUNTER — HOSPITAL ENCOUNTER (OUTPATIENT)
Dept: PHYSICAL THERAPY | Age: 52
Discharge: HOME | End: 2022-12-08
Payer: COMMERCIAL

## 2022-12-08 PROCEDURE — 97140 MANUAL THERAPY 1/> REGIONS: CPT

## 2022-12-11 VITALS — BODY MASS INDEX: 41.95 KG/M2 | WEIGHT: 293 LBS | HEIGHT: 70 IN

## 2022-12-11 NOTE — PROGRESS NOTES
LYMPHEDEMA PT DAILY TREATMENT NOTE - [facility] 2-15     Patient Name: Corrinne Augusta  Date: 2022  : 1970  [x]  Patient  Verified  Payor: Bianca Dominguez / Plan: Danielle Nunes Se HMO / Product Type: HMO /    In time:9:45 am Out time:10:55 am  Total Treatment Time (min): 70  Total Timed Codes (min):70     Visit #: 8    Treatment Area: Lymphedema, not elsewhere classified [I89.0]     SUBJECTIVE  Pain Level (0-10 scale): R ankle pain as 4/10 and low back pain at 6/10, L axillary cording 4/10, R shoulder 3/10  Any medication changes, allergies to medications, adverse drug reactions, diagnosis change, or new procedure performed?: [x] No    [x] Yes (see summary sheet for update)  Subjective functional status/changes:   [x] No changes reported   Patient reports that she found out her advanced vaso-pneumatic device was approved and shipping this week. She also has heard from the vendor in regards to her compression order and this is also being processed. Patient was late for her appointment today because she had thought her visit was for later in the day. 70  min Manual Therapy     Manual Lymphatic Drainage (MLD):  Area to decongest: UE: left and upper quadrant     Patient also with B LE/truncal involvement as well. Sequence used and effectiveness: Modifications were made to manual lymph drainage sequence to exclude cervical techniques secondary to patient's age. 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 Axillary web cording is extensive in the trunk and medial upper arm but does not extend distally to the forearm. Reviewed self soft tissue mobilizations.    Skin/wound care/debridement: Reviewed skin care principles:   Skin care products  Hygiene  Prevention of cellulitis    Skin is intact bilateral LEs and left UE.  Hyperpigmentation is present in the lower extremities and left upper quadrant. Applied multi-layer compression bandaging to: Deferred. Upper/Lower Extremity Compression:     Measured for the following compression products on previous visit:    UE: left LE: bilateral left upper extremity: Arm sleeve, Glove with open fingers, and Top band silicone border and bilateral lower extremity: Knee high Top band silicone border Open toe Nohemy Night garment    Style: Circular knit, Flat-knit, and Foam based    Brand: Medi  Sigvaris  Solaris    Type: Custom: Medi Custom L UE sleeve and glove  CCL2, Medi Custom knee highs CCL2, Custom Sigvaris Nohemy,Custom Tribute Night Garments  lower leg garments. Vendor made clinic aware there was a delay in the availabity of the custom nohemy, so another product may have to be ordered like a Biaflect nohemy in 4 XL to replace the Sigvaris product. Patient understood the issues. Vendor: [employer]    Education: Educated patient in compression garment donning and doffing. Glove use with donning. Daily wear schedule. Daily laundering. Garment lifespan. Return and reordering process (by bringing prior garments into the clinic). Educated patient to monitor for redness or pressure points on the skin. If new pain occurs they should contact their therapist.    Patient/family demonstrated donning and doffing with independence with her current products, will reassess donning and doffing of new products when they arrive. Rationale: decrease pain, increase ROM, increase tissue extensibility, decrease edema , and increase postural awareness to improve the patients ability to prevent worsening of swelling and axillary cord symptoms over time.                                                               With   [] TE   [] TA   [x] MT   [] SC   [x] other: Patient Education: [x] Review HEP and home program  [x] MLD Patient Education Reviewed with patient, as well as demonstration and instruction during MLD portion of the session. Reviewed soft tissue mobilization for axillary web. Dycem provided. [x] Progressed/Changed HEP based on:   [x] positioning   [] Kinesiotape   [x] Skin care   [] wound care   [x] other: Garment ordering process/advanced vasopneumatic pump  Compression garment options for lower extremity  Compression bandaging/garment precautions reviewed: [x] Yes  [] No      Other Objective/Functional Measures:   Height:  Height: 5' 9.5\" (176.5 cm)  Weight:  Weight: 149.4 kg (329 lb 6.4 oz)   BMI:  BMI (calculated): 48  (36 or greater: adversely affecting lymphedema)     Pain Level (0-10 scale) post treatment: Patient reports R shoulder pain as 3/10, R ankle pain as 4/10, low back pain at 6/10, L UE/axilla pain at 6/10     ASSESSMENT/Changes in Function:   Patient continues to make gains, but has continued symptoms, pain and swelling. Once her compression items and advanced vasopneumatic device patient should make gains towards remaining goals when all of her compression items are in place. Patient will follow up next week for continued treatment. Patient will continue to benefit from skilled PT services to  address swelling, analyze and address soft tissue restrictions, analyze compression product fit and use, assess and modify postural abnormalities, instruct in home lymphedema management program, assess fit and effectiveness of compression products, and modify and progress therapeutic interventions to attain remaining goals. []  See Plan of Care  []  See progress note/recertification  []  See Discharge Summary         Progress towards goals / Updated goals:  Short Term Goals:  1. Patient will demonstrate knowledge of signs/symptoms of infections/cellulitis and be independent in skin care to prevent cellulitis. Goal Met 11/28/2022  2.   Patient will demonstrate independence in lymphedema home program of therapeutic exercises to improve circulation and decongest limb to improve ADLs. In progress. 3.  Patient will receive appropriately fitting custom compression garments to prevent worsening of swelling over time. In progress. 4.  Patient will be independent in axillary web cording self soft tissue mobilizations. met     Long Term Goals:  1. Pt will show improvement in the Lymphedema Life Impact Scale by decreasing the score to 20% and thus allow improvement in patient's quality of life. In progress. 2.  Patient will be independent with don/doff of compression system and use in order to prevent reaccumulation of fluid at discharge. In progress. 3.  Pt will be independent in self-MLD and be pain free with functional mobility for transition to independent restorative phase of lymphedema therapy. In progress.      PLAN  []  Upgrade activities as tolerated     [x]  Updated Plan of Care  []  Update interventions per flow sheet       []  Discharge due to:_  []  Other:_        Georges Flowers, PTA, CLT 12/08/2022

## 2022-12-12 ENCOUNTER — HOSPITAL ENCOUNTER (OUTPATIENT)
Dept: PHYSICAL THERAPY | Age: 52
Discharge: HOME | End: 2022-12-12
Payer: COMMERCIAL

## 2022-12-12 ENCOUNTER — TELEPHONE (OUTPATIENT)
Dept: SURGERY | Age: 52
End: 2022-12-12

## 2022-12-12 VITALS — WEIGHT: 293 LBS | HEIGHT: 70 IN | BODY MASS INDEX: 41.95 KG/M2

## 2022-12-12 PROCEDURE — 97140 MANUAL THERAPY 1/> REGIONS: CPT

## 2022-12-12 PROCEDURE — 97110 THERAPEUTIC EXERCISES: CPT

## 2022-12-12 NOTE — TELEPHONE ENCOUNTER
Called patient regarding referral from Jhonatan Harris MD at [facility] to our [address] [place] [address] [facility] for possible removal of lap band. Patient did not answer so I left a vmail with our contact information.

## 2022-12-12 NOTE — PROGRESS NOTES
LYMPHEDEMA PT DAILY TREATMENT NOTE - [facility] 2-15     Patient Name: Claudetta Rainbow  Date: 2022  : 1970  [x]  Patient  Verified  Payor: Hossein Moore / Plan: [address] HMO / Product Type: HMO /    In time:9:15 am Out time:10:55 am  Total Treatment Time (min): 100  Total Timed Codes (min):100     Visit #: 9    Treatment Area: Lymphedema, not elsewhere classified [I89.0]     SUBJECTIVE  Pain Level (0-10 scale): R ankle pain as 3/10 and low back pain at 5/10, L axillary cording 4/10, R shoulder 4/10  Any medication changes, allergies to medications, adverse drug reactions, diagnosis change, or new procedure performed?: [x] No    [x] Yes (see summary sheet for update)  Subjective functional status/changes:   [x] No changes reported   Patient will have in home training and set up for her Flexitouch Plus. Patient having increased cording symptoms with lateral trunk cording that runs from L breast to scapula. Patient does have improvement in left upper quadrant tightness and axilla tightness. Patient does report some increased soreness after treatment, but reports that she would rather have the more intense cord mobilization as it makes her feel better later and is improving her overall discomfort. 18 min Therapeutic Exercise:  [x] Manette Stands Exercises [] Remedial Lymphedema Exercise Program [x] Axillary Web Exercise Program      [x] Shoulder ROM Exercises   Rationale: Activate muscle pump to improve lymphatic fluid movement and decrease swelling to improve the patients ability to perform ADL and IADL skills and prevent worsening of swelling over time. 82   min Manual Therapy     Manual Lymphatic Drainage (MLD):  Area to decongest: UE: left and upper quadrant     Patient also with B LE/truncal involvement as well. Sequence used and effectiveness: Modifications were made to manual lymph drainage sequence to exclude cervical techniques secondary to patient's age.     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 Axillary web cording is extensive in the trunk and medial upper arm but does not extend distally to the forearm. Reviewed self soft tissue mobilizations. Skin/wound care/debridement: Reviewed skin care principles:   Skin care products  Hygiene  Prevention of cellulitis    Skin is intact bilateral LEs and left UE. Hyperpigmentation is present in the lower extremities and left upper quadrant. Applied multi-layer compression bandaging to: Deferred. Upper/Lower Extremity Compression:     Measured for the following compression products on previous visit and the order is in progress. UE: left LE: bilateral left upper extremity: Arm sleeve, Glove with open fingers, and Top band silicone border and bilateral lower extremity: Knee high Top band silicone border Open toe Nohemy Night garment    Style: Circular knit, Flat-knit, and Foam based    Brand: Medi  Sigvaris  Solaris    Type: Custom: Medi Custom L UE sleeve and glove  CCL2, Medi Custom knee highs CCL2, Custom Sigvaris Nohemy,Custom Tribute Night Garments  lower leg garments. Vendor made clinic aware there was a delay in the availabity of the custom nohemy, so another product may have to be ordered like a Biaflect nohemy in 4 XL to replace the Sigvaris product. Patient understood the issues. Vendor: [employer]    Education: Educated patient in compression garment donning and doffing. Glove use with donning. Daily wear schedule. Daily laundering. Garment lifespan. Return and reordering process (by bringing prior garments into the clinic). Educated patient to monitor for redness or pressure points on the skin.   If new pain occurs they should contact their therapist.    Patient/family demonstrated donning and doffing with independence with her current products, will reassess donning and doffing of new products when they arrive. Rationale: decrease pain, increase ROM, increase tissue extensibility, decrease edema , and increase postural awareness to improve the patients ability to prevent worsening of swelling and axillary cord symptoms over time. With   [x] TE   [] TA   [x] MT   [] SC   [x] other: Patient Education: [x] Review HEP and home program  [x] MLD Patient Education Reviewed with patient, as well as demonstration and instruction during MLD portion of the session. Reviewed soft tissue mobilization for axillary web. Dycem provided. [x] Progressed/Changed HEP based on:   [x] positioning   [] Kinesiotape   [x] Skin care   [] wound care   [x] other: Garment ordering process/advanced vasopneumatic pump  Compression garment options for lower extremity  Compression bandaging/garment precautions reviewed: [x] Yes  [] No      Other Objective/Functional Measures:   Height:  Height: 5' 9.5\" (176.5 cm)  Weight:  Weight: 148.5 kg (327 lb 6.4 oz)   BMI:  BMI (calculated): 47.7  (36 or greater: adversely affecting lymphedema)     Pain Level (0-10 scale) post treatment: Patient reports R shoulder pain as 3/10, R ankle pain as 4/10, low back pain at 7/10, L UE/axilla pain at 7/10     ASSESSMENT/Changes in Function:   Patient continues to make gains, but has continued symptoms, pain and swelling. Once her compression items and advanced vasopneumatic device patient should make gains towards remaining goals when all of her compression items are in place. Patient will follow up later this week for continued treatment.  Patient will continue to benefit from skilled PT services to  address swelling, analyze and address soft tissue restrictions, analyze compression product fit and use, assess and modify postural abnormalities, instruct in home lymphedema management program, assess fit and effectiveness of compression products, and modify and progress therapeutic interventions to attain remaining goals. []  See Plan of Care  []  See progress note/recertification  []  See Discharge Summary         Progress towards goals / Updated goals:  Short Term Goals:  1. Patient will demonstrate knowledge of signs/symptoms of infections/cellulitis and be independent in skin care to prevent cellulitis. Goal Met 11/28/2022  2. Patient will demonstrate independence in lymphedema home program of therapeutic exercises to improve circulation and decongest limb to improve ADLs. In progress. 3.  Patient will receive appropriately fitting custom compression garments to prevent worsening of swelling over time. In progress. 4.  Patient will be independent in axillary web cording self soft tissue mobilizations. met     Long Term Goals:  1. Pt will show improvement in the Lymphedema Life Impact Scale by decreasing the score to 20% and thus allow improvement in patient's quality of life. In progress. 2.  Patient will be independent with don/doff of compression system and use in order to prevent reaccumulation of fluid at discharge. In progress. 3.  Pt will be independent in self-MLD and be pain free with functional mobility for transition to independent restorative phase of lymphedema therapy. In progress.      PLAN  []  Upgrade activities as tolerated     [x]  Updated Plan of Care  []  Update interventions per flow sheet       []  Discharge due to:_  []  Other:_        Georges Flowers, PTA, CLT 12/12/2022

## 2022-12-14 RX ORDER — DILTIAZEM HYDROCHLORIDE 180 MG/1
CAPSULE, COATED, EXTENDED RELEASE ORAL
Qty: 90 CAPSULE | Refills: 1 | Status: SHIPPED | OUTPATIENT
Start: 2022-12-14

## 2022-12-15 ENCOUNTER — APPOINTMENT (OUTPATIENT)
Dept: PHYSICAL THERAPY | Age: 52
End: 2022-12-15
Payer: COMMERCIAL

## 2022-12-20 ENCOUNTER — HOSPITAL ENCOUNTER (OUTPATIENT)
Dept: PHYSICAL THERAPY | Age: 52
Discharge: HOME | End: 2022-12-20
Payer: COMMERCIAL

## 2022-12-20 ENCOUNTER — OFFICE VISIT (OUTPATIENT)
Dept: SURGERY | Age: 52
End: 2022-12-20
Payer: COMMERCIAL

## 2022-12-20 VITALS
DIASTOLIC BLOOD PRESSURE: 81 MMHG | OXYGEN SATURATION: 95 % | WEIGHT: 293 LBS | HEART RATE: 87 BPM | HEIGHT: 70 IN | BODY MASS INDEX: 41.95 KG/M2 | TEMPERATURE: 98.5 F | SYSTOLIC BLOOD PRESSURE: 131 MMHG

## 2022-12-20 DIAGNOSIS — K95.09 GASTRIC BAND MALFUNCTION: ICD-10-CM

## 2022-12-20 DIAGNOSIS — R13.19 ESOPHAGEAL DYSPHAGIA: ICD-10-CM

## 2022-12-20 DIAGNOSIS — E66.01 MORBID OBESITY (HCC): Primary | ICD-10-CM

## 2022-12-20 PROCEDURE — 3074F SYST BP LT 130 MM HG: CPT | Performed by: SURGERY

## 2022-12-20 PROCEDURE — 99204 OFFICE O/P NEW MOD 45 MIN: CPT | Performed by: SURGERY

## 2022-12-20 PROCEDURE — 97140 MANUAL THERAPY 1/> REGIONS: CPT

## 2022-12-20 PROCEDURE — 3078F DIAST BP <80 MM HG: CPT | Performed by: SURGERY

## 2022-12-20 NOTE — PROGRESS NOTES
Identified pt with two pt identifiers (name and ). Reviewed chart in preparation for visit and have obtained necessary documentation. Mushtaq Blackburn is a 47 y.o. female  Chief Complaint   Patient presents with    Morbid Obesity     Discuss possible lap band removal.     Visit Vitals  /81 (BP 1 Location: Right lower arm, BP Patient Position: Sitting, BP Cuff Size: Large adult)   Pulse 87   Temp 98.5 °F (36.9 °C) (Oral)   Ht 5' 10\" (1.778 m)   Wt 324 lb (147 kg)   LMP 2021 (Approximate)   SpO2 95%   BMI 46.49 kg/m²       1. Have you been to the ER, urgent care clinic since your last visit? Hospitalized since your last visit? No    2. Have you seen or consulted any other health care providers outside of the [address] [place] [address] since your last visit? Include any pap smears or colon screening.  No

## 2022-12-20 NOTE — PROGRESS NOTES
Bariatric Surgery Office Consultation / H & P    CC: Obesity and band  History of Present Illness:      Jaimie Coto is a 47 y.o. female who presents with history of gastric band    Patient reports a history of gastric band insertion years ago. She did not tolerate this very well and lost about 40 pounds overall. She had all of the fluid removed from the band 2 years post operative. She has had difficulty swallowing and vomiting sometimes. She has had work-up with Dr. Sosa Cancer to include an EGD which showed no gastric band erosion and an upper GI showing some esophageal stasis and tertiary contractions likely from chronic partial obstruction from the band. Patient reports her weight is about 20 pounds below her prior maximum weight. She has a history of therapy for breast cancer along with significant joint disease requiring chronic NSAIDs. She is here to discuss removal of band versus conversion to another bariatric procedure. No sleep apnea, no tobacco use, chronic obstructive symptoms without GERD.     Past Medical History:   Diagnosis Date    Arrhythmia     MURMUR    Asthma     AS A CHILD    Breast cancer ([address] 07/21/2016    LEFT    Diverticulitis     Diverticulosis     Esophageal spasm     Fibromyalgia     GERD (gastroesophageal reflux disease)     Heart murmur     History of chemotherapy 01/11/1977    Completed    Hypertension     IBS (irritable bowel syndrome)     Ill-defined condition     LYMPHEDEMA LEFT ARM    Lymphedema     LEFT ARM    Morbid obesity ([address]     Nausea & vomiting     Osteoarthritis     PUD (peptic ulcer disease)     YEARS AGO    S/P radiation therapy 04/12/2017    Completed    Thalassemia      Past Surgical History:   Procedure Laterality Date    HX BARIATRIC SURGERY  10/2010    lap band    HX BREAST LUMPECTOMY Left 07/2016    CHEMO AND RADIATION    HX COLONOSCOPY  2017    HX DILATION AND CURETTAGE  11/2020, 2021    HX GASTRIC BYPASS  10/2010    LAP BAND    HX HYSTERECTOMY      HX KNEE ARTHROSCOPY Bilateral 2008    HX ORTHOPAEDIC  10/2014    right foot and ankle reconstruction for flat feet    HX ORTHOPAEDIC Left 2006    ACHILLES TENDON REPAIR    HX TONSILLECTOMY        Family History   Problem Relation Age of Onset    OSTEOARTHRITIS Mother     Thalassemia Mother     Hypertension Mother     Heart Disease Father         chf    Cancer Father         lung    Hypertension Father     Arthritis Father     Heart Failure Father     Arthritis-rheumatoid Brother     Hypertension Brother     Hypertension Brother     Elevated Lipids Brother     OSTEOARTHRITIS Brother     OSTEOARTHRITIS Sister     Other Brother         SARCOIDOSIS    Anesth Problems Neg Hx      Social History     Socioeconomic History    Marital status: SINGLE   Tobacco Use    Smoking status: Never    Smokeless tobacco: Never   Vaping Use    Vaping Use: Never used   Substance and Sexual Activity    Alcohol use: Not Currently    Drug use: Never    Sexual activity: Not Currently      Prior to Admission medications    Medication Sig Start Date End Date Taking? Authorizing Provider   dilTIAZem ER (CARDIZEM CD) 180 mg capsule TAKE 1 CAPSULE BY MOUTH EVERY DAY 12/14/22  Yes Justine Preston MD   DULoxetine (CYMBALTA) 60 mg capsule TAKE 1 CAPSULE BY MOUTH EVERY DAY 11/2/22  Yes Ankita Dudley MD   losartan-hydroCHLOROthiazide (HYZAAR) 100-12.5 mg per tablet TAKE 1 TABLET BY MOUTH EVERY DAY 11/2/22  Yes Ankita Dudley MD   desonide (TRIDESILON) 0.05 % cream APPLY A SMALL AMOUNT TO AFFECTED AREA TWICE A DAY AS NEEDED 4/21/22  Yes Provider, Historical   Linzess 290 mcg cap capsule TAKE 1 CAPSULE EVERY MORNING 30 MIN. BEFORE FIRST MEAL 9/28/22  Yes Provider, Historical   budesonide (RHINOCORT AQUA) 32 mcg/actuation nasal spray 2 Sprays by Both Nostrils route daily. Yes Provider, Historical   aspirin-acetaminophen-caffeine (Excedrin Extra Strength) 250-250-65 mg per tablet 2 Tablets two (2) times a day.    Yes Provider, Historical propylene glycoL (Systane Balance) 0.6 % drop Apply 1 Drop to eye two (2) times daily as needed. Yes Provider, Historical   biotin 10,000 mcg cap Take 1 Tablet by mouth daily. Yes Provider, Historical   cholecalciferol (VITAMIN D3) (5000 Units/125 mcg) tab tablet Take 5,000 Units by mouth daily. Yes Provider, Historical   cyanocobalamin 1,000 mcg tablet Take 100 mcg by mouth daily. Yes Provider, Historical   folic acid 242 mcg tablet Take 400 mcg by mouth daily. Yes Provider, Historical   multivitamin capsule Take 1 Capsule by mouth daily. Yes Provider, Historical   methocarbamoL (ROBAXIN) 500 mg tablet Take 1 Tablet by mouth four (4) times daily. As needed for muscle spasms 8/5/22   JESSY Del Rosario     Allergies   Allergen Reactions    Other Food Sneezing    Adhesive Other (comments)     Tears skin off. Severe.       Oxycodone Itching    Lisinopril Other (comments)     \"constant tickle in my throat\"  \"constant tickle in my throat\"      Mold Other (comments)    Nickel Other (comments)    Sulfa (Sulfonamide Antibiotics) Other (comments)    Sulfamethoxazole-Trimethoprim Hives    Anesthetics - Amide Type - Select Amino Amides Nausea Only       Review of Systems:  Constitutional: No fever or chills  Neurologic: No headache  Eyes: No scleral icterus or irritated eyes  Nose: No nasal pain or drainage  Mouth: No oral lesions or sore throat  Cardiac: No palpations or chest pain  Pulmonary: No cough or shortness of breath  Gastrointestinal: Dysphagia, no nausea, emesis, diarrhea, or constipation  Genitourinary: No dysuria  Musculoskeletal: No muscle or joint tenderness  Skin: No rashes or lesions  Psychiatric: No anxiety or depressed mood    Physical Exam:   Visit Vitals  /81 (BP 1 Location: Right lower arm, BP Patient Position: Sitting, BP Cuff Size: Large adult)   Pulse 87   Temp 98.5 °F (36.9 °C) (Oral)   Ht 5' 10\" (1.778 m)   Wt 324 lb (147 kg)   SpO2 95%   BMI 46.49 kg/m²     General: No acute distress, conversant  Eyes: PERRLA, no scleral icterus  HENT: Normocephalic without oral lesions  Neck: Trachea midline without LAD  Cardiac: Normal pulse rate and rhythm  Pulmonary: Symmetric chest rise with normal effort  GI: Soft, obese, NT, ND, no hernia, no splenomegaly, port in right mid abdomen  Skin: Warm without rash  Extremities: No edema or joint stiffness  Psych: Appropriate mood and affect    Assessment:     42-year-old female with a gastric band in place with weight regain and signs of chronic partial esophageal obstruction    Plan:     Patient has signs of partial esophageal obstruction with tertiary contractions and esophageal stasis and upper GI. If left untreated this can lead to esophageal burnout. No signs of gastric band erosion. Recommend removal of gastric band regardless of her neck steps to prevent chronic esophageal changes future erosion. In terms of her weight loss options she has 2 options in my mind. #1 would be a 1 stage conversion from gastric band to bypass. We discussed the risks and benefits of this procedure and discussed her need to avoid NSAIDs, smoking, and steroids long-term or risk gastric ulcers. #2 would be a two-stage conversion removing the band the first part of the year and then later on in the year doing a conversion to the PABLITO procedure if her esophagus recovers. I do have some concern about reflux with the PABLITO and esophageal motility. This would be a better option though in terms of her chronic NSAID use for her joint disease and lowering the risk of ulcer. The patient will call my  when she is reaches her decision and we will get her on the correct pathway for what ever option she chooses.     Signed By: Yesi Stacy MD  Bariatric and General Surgeon  [facility]    December 20, 2022

## 2022-12-22 NOTE — PROGRESS NOTES
[facility]  a part of [address] [place], [place] [address]jeovany Davis  [address], [address] [facility], [address]  Phone: [phone]  Fax: [phone]           Progress Note     Name: Gracia Macedo   : 1970            MD: Aly Hernandez*     Treatment Diagnosis: Lymphedema, not elsewhere classified [I89.0]  Start of Care: 2022                      Visits from Start of Care: 10  Missed Visits: 7     Summary of Care: Education on skin care, exercise, axillary web stretching, manual lymph drainage, Self MLD, Compression garment measuring/fitting, vasopneumatic device. Assessment / Recommendations:    Patient continues to make gains, but has persistent symptoms of axillary web cording now focused mostly in anterior chest wall, lateral trunk, and posterior trunk inferior and lateral to scapula. L UE cords are much improved. Patient able to come in today with her new compression garments for fitting and the initial fit was good and patient is very comfortable in all the day time garments. Patient did have some concerns with the length of her custom night time garments as the garment foot length came in shorter than requested and she would prefer to have her toes covered. Will reach out to vendor to work on correcting patient's concern. Patient has also been set up with her advanced vasopneumatic device and there is noticeable improvement in truncal swelling, especially in the L upper quadrant since beginning use. Patient now with treatment frequency of once a week and will reassess frequency once effectiveness of new products have been assessed on next visit after regular use. Patient has not been able to have new compression products for over a year per her reports so she is very happy to have products to utilize daily to work towards improving her managing her swelling.  Patient will continue to benefit from skilled PT services to address swelling, analyze and address soft tissue restrictions, analyze compression product fit and use, assess and modify postural abnormalities, instruct in home lymphedema management program, assess fit and effectiveness of compression products, and modify and progress therapeutic interventions to attain remaining goals. Short Term Goals:to be met in 6 treatments  1. Patient will demonstrate knowledge of signs/symptoms of infections/cellulitis and be independent in skin care to prevent cellulitis. Status at last note/certification: not met, in progress  Status at progress note: met  2. Patient will demonstrate independence in lymphedema home program of therapeutic exercises to improve circulation and decongest limb to improve ADLs. Status at last note/certification: not met, in progress  Status at progress note: not met, in progress  3. Patient will receive appropriately fitting custom compression garments to prevent worsening of swelling over time. Status at last note/certification: not met, in progress  Status at progress note: not met, in progress  4. Patient will be independent in axillary web cording self soft tissue mobilizations. Status at last note/certification:met  Status at progress note: met  5:  Therapist will investigate patient's eligibility timeframe for a new vasopneumatic device to address swelling in left UE and bilateral lower extremities. Status at last note/certification:met  Status at progress note: met     Long Term Goals:12 treatments  1. Pt will show improvement in the Lymphedema Life Impact Scale by decreasing the score to 20% and thus allow improvement in patient's quality of life. Status at last note/certification:not me, in progress  Status at progress note: not met, in progress  2. Patient will be independent with don/doff of compression system and use in order to prevent reaccumulation of fluid at discharge. Status at last note/certification:met  Status at progress note: met  3.   Pt will be independent in self-MLD and be pain free with functional mobility for transition to independent restorative phase of lymphedema therapy. Status at last note/certification:not me, in progress  Status at progress note: not met, in progress        Other: Continue with treatment and transition to the restorative phase of care once goals are met.           Georges Flowers, PTA, CLT 12/21/2022   Annie Brandon, MSPT, CLT-YAQUELIN

## 2022-12-22 NOTE — PROGRESS NOTES
LYMPHEDEMA PT DAILY TREATMENT NOTE - [facility] 2-15     Patient Name: Link Guess  Date: 2022  : 1970  [x]  Patient  Verified  Payor: [employer] / Plan: [address] HMO / Product Type: HMO /    In time:12:15 pm Out time:1:45 pm  Total Treatment Time (min): 90  Total Timed Codes (min):90     Visit #: 10    Treatment Area: Lymphedema, not elsewhere classified [I89.0]     SUBJECTIVE  Pain Level (0-10 scale): R ankle pain as 3/10 and low back pain at 5/10, L axillary cording 3/10, R shoulder 4/10  Any medication changes, allergies to medications, adverse drug reactions, diagnosis change, or new procedure performed?: [x] No    [x] Yes (see summary sheet for update)  Subjective functional status/changes:   [x] No changes reported   Patient had to cancel last visit due to tension headache and increased BP. Patient was able to receive all of her initial compression garment order and brought all products with exception of compression bra in today for assessment. Patient comfortable in all items, but complains of the length of her custom [employer], which came in shorter than measured so vendor will be contacted on how to proceed. 90  min Manual Therapy     Manual Lymphatic Drainage (MLD):  Area to decongest: UE: left and upper quadrant     Patient also with B LE/truncal involvement as well. Sequence used and effectiveness: Modifications were made to manual lymph drainage sequence to exclude cervical techniques secondary to patient's age. 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 Axillary web cording is extensive in the trunk and medial upper arm but does not extend distally to the forearm. Reviewed self soft tissue mobilizations.    Skin/wound care/debridement: Reviewed skin care principles:   Skin care products  Hygiene  Prevention of cellulitis    Skin is intact bilateral LEs and left UE. Hyperpigmentation is present in the lower extremities and left upper quadrant. Applied multi-layer compression bandaging to: Deferred. Upper/Lower Extremity Compression:     Fitted for the following compression products:    UE: left LE: bilateral left upper extremity: Arm sleeve, Glove with open fingers, and Top band silicone border and bilateral lower extremity: Knee high Top band silicone border Open toe Nohemy Night garment    Style: Circular knit, Flat-knit, and Foam based    Brand: Medi  Sigvaris  Solaris    Type: Custom: Medi Custom L UE sleeve and glove  CCL2, Medi Custom knee highs CCL2, Custom Sigvaris Nohemy,Custom Tribute Night Garments  lower leg garments. Vendor made clinic aware there was a delay in the availabity of the custom nohemy, so another product may have to be ordered like a Biaflect nohemy in 4 XL to replace the Sigvaris product. Patient understood the issues. Patient did not bring in her compression bra/vest for assessment today. Vendor: [employer]    Education: Educated patient in compression garment donning and doffing. Glove use with donning. Daily wear schedule. Daily laundering. Garment lifespan. Return and reordering process (by bringing prior garments into the clinic). Educated patient to monitor for redness or pressure points on the skin. If new pain occurs they should contact their therapist.    Patient/family demonstrated donning and doffing with independence     Patient left the [employer] here with the clinic as she would like to look into them being remade due to issues with foot length. Vendor will be contacted and pictures were taken to send to assist with remake. Will assess garments next visit before proceeding with second set if there are any changes that may need to be made after wash and wearing of items.     Patient received her advanced vaso-pneumatic device and has used the device 10 times since receiving. Rationale: decrease pain, increase ROM, increase tissue extensibility, decrease edema , and increase postural awareness to improve the patients ability to prevent worsening of swelling and axillary cord symptoms over time. With   [] TE   [] TA   [x] MT   [] SC   [x] other: Patient Education: [x] Review HEP and home program  [x] MLD Patient Education Reviewed with patient, as well as demonstration and instruction during MLD portion of the session. Reviewed soft tissue mobilization for axillary web. Dycem provided. [x] Progressed/Changed HEP based on:   [x] positioning   [] Kinesiotape   [x] Skin care   [] wound care   [x] other: Garment fitting/education and vaso-pneumatic pump use. Compression garment options for lower extremity  Compression bandaging/garment precautions reviewed: [x] Yes  [] No      Other Objective/Functional Measures:   Deferred full volumes for UEs and LEs today for fitting of garments. Will reassess next visit. Truncal Girths 12/20/2022 12/1/2022   Axilla 120.2  122.5      Mid Chest 138  137      Xiphoid 114  123      Mid abdomen 125  Not assessed on this date      Waist 130    138      Hips 153  148          Pain Level (0-10 scale) post treatment: Patient reports R shoulder pain as 3/10, R ankle pain as 4/10, low back pain at 5/10, L UE/axilla pain at 4/10     ASSESSMENT/Changes in Function:   Patient continues to make gains, but has persistent symptoms of axillary web cording now focused mostly in anterior chest wall, lateral trunk, and posterior trunk inferior and lateral to scapula. L UE cords are much improved. Patient able to come in today with her new compression garments for fitting and the initial fit was good and patient is very comfortable in all the day time garments.  Patient did have some concerns with the length of her custom night time garments as the garment foot length came in shorter than requested and she would prefer to have her toes covered. Will reach out to vendor to work on correcting patient's concern. Patient has also been set up with her advanced vasopneumatic device and there is noticeable improvement in truncal swelling, especially in the L upper quadrant since beginning use. Patient now with treatment frequency of once a week and will reassess frequency once effectiveness of new products have been assessed on next visit after regular use. Patient has not been able to have new compression products for over a year per her reports so she is very happy to have products to utilize daily to work towards improving her managing her swelling. Patient will continue to benefit from skilled PT services to address swelling, analyze and address soft tissue restrictions, analyze compression product fit and use, assess and modify postural abnormalities, instruct in home lymphedema management program, assess fit and effectiveness of compression products, and modify and progress therapeutic interventions to attain remaining goals. []  See Plan of Care  [x]  See progress note/recertification  []  See Discharge Summary         Progress towards goals / Updated goals:  Short Term Goals:to be met in 6 treatments  1. Patient will demonstrate knowledge of signs/symptoms of infections/cellulitis and be independent in skin care to prevent cellulitis. Goal Met 11/28/2022  2. Patient will demonstrate independence in lymphedema home program of therapeutic exercises to improve circulation and decongest limb to improve ADLs. In progress. 3.  Patient will receive appropriately fitting custom compression garments to prevent worsening of swelling over time. Partially met. 4.  Patient will be independent in axillary web cording self soft tissue mobilizations.  Met  5:  Therapist will investigate patient's eligibility timeframe for a new vasopneumatic device to address swelling in left UE and bilateral lower extremities. Goal Met 12/01/2022        Long Term Goals:12 treatments  1. Pt will show improvement in the Lymphedema Life Impact Scale by decreasing the score to 20% and thus allow improvement in patient's quality of life. In progress. 2.  Patient will be independent with don/doff of compression system and use in order to prevent reaccumulation of fluid at discharge. Goal Met 12/20/2022  3. Pt will be independent in self-MLD and be pain free with functional mobility for transition to independent restorative phase of lymphedema therapy. In progress.      PLAN  []  Upgrade activities as tolerated     [x] Continue Updated Plan of Care  []  Update interventions per flow sheet       []  Discharge due to:_  []  Other:_        Georges Flowers, PTA, CLT 12/21/2022  RAZ OscarT, CLCHRISTINE

## 2022-12-27 ENCOUNTER — HOSPITAL ENCOUNTER (OUTPATIENT)
Dept: PHYSICAL THERAPY | Age: 52
Discharge: HOME | End: 2022-12-27
Payer: COMMERCIAL

## 2022-12-27 PROCEDURE — 97140 MANUAL THERAPY 1/> REGIONS: CPT

## 2022-12-27 NOTE — PROGRESS NOTES
LYMPHEDEMA PT DAILY TREATMENT NOTE - [facility] 2-15     Patient Name: Janiya Moise  Date: 2022  : 1970  [x]  Patient  Verified  Payor: Mer[employer] / Plan: [address] HMO / Product Type: HMO /    In time:1:00 pm Out time:2:25 pm  Total Treatment Time (min): 85  Total Timed Codes (min):85     Visit #: 11    Treatment Area: Lymphedema, not elsewhere classified [I89.0]     SUBJECTIVE  Pain Level (0-10 scale): R ankle pain as 3/10 and low back pain at 5/10, L axillary cording 6/10, R shoulder 8/10 (fibromyalgia)  Any medication changes, allergies to medications, adverse drug reactions, diagnosis change, or new procedure performed?: [x] No    [x] Yes (see summary sheet for update)  Subjective functional status/changes:   [x] No changes reported     Fibromyalgia is bothering patient today all over, mobilizing slower and is in more discomfort. Patient with no concerns with new day time garments so proceeded with ordering second set per patient's request.   Patient's Lurlene Plough were returned for remake as they came in too short. Patient will bring the items back in when she receives them for reassessment. 85  min Manual Therapy     Manual Lymphatic Drainage (MLD):  Area to decongest: UE: left and upper quadrant     Patient also with B LE/truncal involvement as well. Sequence used and effectiveness: Modifications were made to manual lymph drainage sequence to exclude cervical techniques secondary to patient's age. 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 Axillary web cording is extensive in the trunk and medial upper arm but does not extend distally to the forearm. Reviewed self soft tissue mobilizations. Patient struggles with self mobilization due to R shoulder limitations.    Skin/wound care/debridement: Reviewed skin care principles:   Skin care products  Hygiene  Prevention of cellulitis    Skin is intact bilateral LEs and left UE. Hyperpigmentation is present in the lower extremities and left upper quadrant. Applied multi-layer compression bandaging to: Deferred. Upper/Lower Extremity Compression:     Fitted for the following compression products:    UE: left LE: bilateral left upper extremity: Arm sleeve, Glove with open fingers, and Top band silicone border and bilateral lower extremity: Knee high Top band silicone border Open toe Nohemy Night garment    Style: Circular knit, Flat-knit, and Foam based    Brand: Medi  Sigvaris  Solaris    Type: Custom: Medi Custom L UE sleeve and glove  CCL2, Medi Custom knee highs CCL2, Custom Sigvaris Nohemy,Custom Tribute Night Garments  lower leg garments. Vendor made clinic aware there was a delay in the availabity of the custom nohemy, so another product may have to be ordered like a Biaflect nohemy in 4 XL to replace the Sigvaris product. Patient understood the issues. Patient did not bring in her compression bra/vest for assessment today. Would like to order a [place] for second compression bra and to get an extender. Bernardino Mendez will be remade and were sent back for assessment due to coming in shorter than measured. Vendor: [employer]    Education: Educated patient in compression garment donning and doffing. Glove use with donning. Daily wear schedule. Daily laundering. Garment lifespan. Return and reordering process (by bringing prior garments into the clinic). Educated patient to monitor for redness or pressure points on the skin. If new pain occurs they should contact their therapist.    Patient/family demonstrated donning and doffing with independence     Patient utilizing advanced vaso-pneumatic device as recommended in the home setting.    Rationale: decrease pain, increase ROM, increase tissue extensibility, decrease edema , and increase postural awareness to improve the patients ability to prevent worsening of swelling and axillary cord symptoms over time. With   [] TE   [] TA   [x] MT   [] SC   [x] other: Patient Education: [x] Review HEP and home program  [x] MLD Patient Education Reviewed with patient, as well as demonstration and instruction during MLD portion of the session. Reviewed soft tissue mobilization for axillary web. Dycem provided. [x] Progressed/Changed HEP based on:   [x] positioning   [] Kinesiotape   [x] Skin care   [] wound care   [x] other: Garment fitting/education and vaso-pneumatic pump use. Compression garment options for lower extremity  Compression bandaging/garment precautions reviewed: [x] Yes  [] No      Other Objective/Functional Measures:   Planned to reassess volumes and girths today, but patient did not wear in her new items for visit. Patient encouraged to wear in all of her new products next visit and volumes and girths will be assessed at that time. Pain Level (0-10 scale) post treatment: Patient reports R shoulder pain as 7/10, R ankle pain as 3/10, low back pain at 5/10, L UE/axilla pain at 5/10     ASSESSMENT/Changes in Function:   Patient continues to make gains, but has persistent symptoms of axillary web cording now focused mostly in anterior chest wall, lateral trunk, and posterior trunk inferior and lateral to scapula. L UE cords are much improved. Patient struggling with fibromyalgia symptoms today. Patient did not wear in her new compression garments, but reports that all of her items are fitting well and wanted to proceed with ordering second set today. Night time garments were returned for remake due to issues with length coming in shorter than measured. Patient to continue to work on her home program and utilize her products and return for follow up to reassess progress.  Patient will continue to benefit from skilled PT services to address swelling, analyze and address soft tissue restrictions, analyze compression product fit and use, assess and modify postural abnormalities, instruct in home lymphedema management program, assess fit and effectiveness of compression products, and modify and progress therapeutic interventions to attain remaining goals. []  See Plan of Care  []  See progress note/recertification  []  See Discharge Summary         Progress towards goals / Updated goals:  Short Term Goals:to be met in 6 treatments  1. Patient will demonstrate knowledge of signs/symptoms of infections/cellulitis and be independent in skin care to prevent cellulitis. Goal Met 11/28/2022  2. Patient will demonstrate independence in lymphedema home program of therapeutic exercises to improve circulation and decongest limb to improve ADLs. In progress. 3.  Patient will receive appropriately fitting custom compression garments to prevent worsening of swelling over time. Partially met. 4.  Patient will be independent in axillary web cording self soft tissue mobilizations. Met  5:  Therapist will investigate patient's eligibility timeframe for a new vasopneumatic device to address swelling in left UE and bilateral lower extremities. Goal Met 12/01/2022        Long Term Goals:12 treatments  1. Pt will show improvement in the Lymphedema Life Impact Scale by decreasing the score to 20% and thus allow improvement in patient's quality of life. In progress. 2.  Patient will be independent with don/doff of compression system and use in order to prevent reaccumulation of fluid at discharge. Goal Met 12/20/2022  3. Pt will be independent in self-MLD and be pain free with functional mobility for transition to independent restorative phase of lymphedema therapy. In progress.      PLAN  []  Upgrade activities as tolerated     [x] Continue Updated Plan of Care  []  Update interventions per flow sheet       []  Discharge due to:_  [] Other:_        Jhonatan Roof Patch, PTA, CLT 12/27/2022

## 2023-01-03 ENCOUNTER — HOSPITAL ENCOUNTER (OUTPATIENT)
Dept: PHYSICAL THERAPY | Age: 53
Discharge: HOME | End: 2023-01-03
Payer: COMMERCIAL

## 2023-01-03 VITALS
OXYGEN SATURATION: 98 % | HEIGHT: 70 IN | BODY MASS INDEX: 41.95 KG/M2 | HEART RATE: 85 BPM | DIASTOLIC BLOOD PRESSURE: 92 MMHG | SYSTOLIC BLOOD PRESSURE: 150 MMHG | WEIGHT: 293 LBS

## 2023-01-03 PROCEDURE — 97140 MANUAL THERAPY 1/> REGIONS: CPT

## 2023-01-03 PROCEDURE — 97110 THERAPEUTIC EXERCISES: CPT

## 2023-01-03 NOTE — PROGRESS NOTES
LYMPHEDEMA PT DAILY TREATMENT NOTE - [facility] -15     Patient Name: Simona Freshwater  Date: 1/3/2023  : 1970  [x]  Patient  Verified  Payor: Karen Vázquez / Plan: Danielle Nunes Se HMO / Product Type: HMO /    In time:12:50 pm Out time:2:35 pm  Total Treatment Time (min): 105  Total Timed Codes (min):105     Visit #: 12    Treatment Area: Lymphedema, not elsewhere classified [I89.0]     SUBJECTIVE:  Pain Level (0-10 scale): Low back pain at 5/10, L axillary cording 4/10, R shoulder 7/10 (fibromyalgia)  Any medication changes, allergies to medications, adverse drug reactions, diagnosis change, or new procedure performed?: [x] No    [x] Yes (see summary sheet for update)  Subjective functional status/changes:   [x] No changes reported   Patient reports having an episode like she may pass out today about an hour. This has happened a few times recently she reports. Last PCP visit was in August. Encouraged her to log her activities to give insight to any patterns since she has had a few episodes of feeling like she may pass out. Vitals taken and water offered as patient has not ate or drank anything today. Patient has not taken any medications either she reports. Fatigued today, but about the same as she normally feels when fibromyalgia flares. 8 min Therapeutic Exercise:  [] Love Pastel Exercises [] Remedial Lymphedema Exercise Program [x] Axillary Web Exercise Program      [] Shoulder ROM Exercises   Rationale: Activate muscle pump to improve lymphatic fluid movement and decrease swelling to improve the patients ability to perform ADL and IADL skills and prevent worsening of swelling over time. 97 min Manual Therapy     Manual Lymphatic Drainage (MLD):  Area to decongest: UE: left and upper quadrant     Patient also with B LE/truncal involvement as well.     Sequence used and effectiveness: Modifications were made to manual lymph drainage sequence to exclude cervical techniques secondary to patient's age.    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 Axillary web cording is extensive in the trunk and medial upper arm but does not extend distally to the forearm. Reviewed self soft tissue mobilizations. Patient struggles with self mobilization due to R shoulder limitations. Skin/wound care/debridement: Reviewed skin care principles:   Skin care products  Hygiene  Prevention of cellulitis    Skin is intact bilateral LEs and left UE. Hyperpigmentation is present in the lower extremities and left upper quadrant. Applied multi-layer compression bandaging to: Deferred. Upper/Lower Extremity Compression:     Utilizing the following compression products:    UE: left LE: bilateral left upper extremity: Arm sleeve, Glove with open fingers, and Top band silicone border and bilateral lower extremity: Knee high Top band silicone border Open toe Nohemy Night garment    Style: Circular knit, Flat-knit, and Foam based    Brand: Medi  Sigvaris  Solaris    Type: Custom: Medi Custom L UE sleeve and glove  CCL2, Medi Custom knee highs CCL2, Custom Sigvaris Nohemy,Custom Tribute Night Garments  lower leg garments. Vendor made clinic aware there was a delay in the availabity of the custom nohemy, so another product may have to be ordered like a Biaflect nohemy in 4 XL to replace the Sigvaris product. Patient understood the issues. Would like to order a [employer] for second compression bra and to get an extender. Minerva Nissen will be remade and were sent back for assessment due to coming in shorter than measured. Vendor: [employer]    Education: Educated patient in compression garment donning and doffing. Glove use with donning. Daily wear schedule. Daily laundering. Garment lifespan.   Return and reordering process (by bringing prior garments into the clinic). Educated patient to monitor for redness or pressure points on the skin. If new pain occurs they should contact their therapist.    Patient/family demonstrated donning and doffing with independence     Patient utilizing advanced vaso-pneumatic device as recommended in the home setting. Rationale: decrease pain, increase ROM, increase tissue extensibility, decrease edema , and increase postural awareness to improve the patients ability to prevent worsening of swelling and axillary cord symptoms over time. With   [] TE   [] TA   [x] MT   [] SC   [] other: Patient Education: [x] Review HEP and home program  [x] MLD Patient Education Reviewed with patient, as well as demonstration and instruction during MLD portion of the session. Reviewed soft tissue mobilization for axillary web. Dycem provided. [x] Progressed/Changed HEP based on:   [x] positioning   [] Kinesiotape   [x] Skin care   [] wound care   [] other:   Compression garment options for lower extremity  Compression bandaging/garment precautions reviewed: [x] Yes  [] No      Other Objective/Functional Measures:   UEs:  -left upper 6,136.54 ml today compared to 6,416. 59 ml on 12/05/2022 compared to 6,025.19 ml on 8/08/2022.     -right upper  6,029.88 ml today compared to 5,949.73 ml on 12/05/2022 compared to 5,891.80 ml on 8/08/2022. Percent difference today is 1.77% as compared to 2.26% on evaluation. LEs  -left lower 13,446.05 ml today compared to 13,806.75 ml on 12/05/2022.     -right lower  13,175.50 ml today compared to 13,945.37 ml on 12/05/2022. Percent difference today is 2.05% as compared to -0.99% on evaluation.       Vitals:  See vitals in [facility] flow sheets     Pain Level (0-10 scale) post treatment: Low back pain at 5/10, L axillary cording 6/10, R shoulder 7/10 (fibromyalgia)     ASSESSMENT/Changes in Function: Patient continues to make gains, but has persistent symptoms of axillary web cording now focused mostly in anterior chest wall, lateral trunk, and posterior trunk inferior and lateral to scapula. L UE cords have improved. Patient continues to struggle with fibromyalgia symptoms and today reports that she had felt like she would pass out earlier today and this has occurred a few other times. Patient's BP noted to be higher today, but patient has not taken any of her medications,ate or drank any fluids today. Patient to continue to work on her home program and utilize her products and return for follow up next week. Encouraged her to touch base with PCP regarding symptoms she reported today. Patient will continue to benefit from skilled PT services to address swelling, analyze and address soft tissue restrictions, analyze compression product fit and use, assess and modify postural abnormalities, instruct in home lymphedema management program, assess fit and effectiveness of compression products, and modify and progress therapeutic interventions to attain remaining goals. []  See Plan of Care  []  See progress note/recertification  []  See Discharge Summary         Progress towards goals / Updated goals:  Short Term Goals:to be met in 6 treatments  1. Patient will demonstrate knowledge of signs/symptoms of infections/cellulitis and be independent in skin care to prevent cellulitis. Goal Met 11/28/2022  2. Patient will demonstrate independence in lymphedema home program of therapeutic exercises to improve circulation and decongest limb to improve ADLs. In progress. 3.  Patient will receive appropriately fitting custom compression garments to prevent worsening of swelling over time. Partially met. 4.  Patient will be independent in axillary web cording self soft tissue mobilizations.  Met  5:  Therapist will investigate patient's eligibility timeframe for a new vasopneumatic device to address swelling in left UE and bilateral lower extremities. Goal Met 12/01/2022        Long Term Goals:12 treatments  1. Pt will show improvement in the Lymphedema Life Impact Scale by decreasing the score to 20% and thus allow improvement in patient's quality of life. In progress. 2.  Patient will be independent with don/doff of compression system and use in order to prevent reaccumulation of fluid at discharge. Goal Met 12/20/2022  3. Pt will be independent in self-MLD and be pain free with functional mobility for transition to independent restorative phase of lymphedema therapy. In progress.      PLAN  []  Upgrade activities as tolerated     [x] Continue Updated Plan of Care  []  Update interventions per flow sheet       []  Discharge due to:_  []  Other:_        Georges C Patch, PTA, CLT 1/3/2022

## 2023-01-09 ENCOUNTER — HOSPITAL ENCOUNTER (OUTPATIENT)
Dept: PHYSICAL THERAPY | Age: 53
Discharge: HOME | End: 2023-01-09
Payer: COMMERCIAL

## 2023-01-09 ENCOUNTER — HOSPITAL ENCOUNTER (OUTPATIENT)
Dept: MAMMOGRAPHY | Age: 53
Discharge: HOME | End: 2023-01-09
Attending: INTERNAL MEDICINE
Payer: COMMERCIAL

## 2023-01-09 VITALS — HEIGHT: 70 IN | WEIGHT: 293 LBS | BODY MASS INDEX: 41.95 KG/M2

## 2023-01-09 DIAGNOSIS — Z85.3 HISTORY OF BREAST CANCER: ICD-10-CM

## 2023-01-09 DIAGNOSIS — M79.7 FIBROMYALGIA: ICD-10-CM

## 2023-01-09 DIAGNOSIS — Z98.890 HISTORY OF LUMPECTOMY OF LEFT BREAST: ICD-10-CM

## 2023-01-09 DIAGNOSIS — I10 PRIMARY HYPERTENSION: ICD-10-CM

## 2023-01-09 PROCEDURE — 77063 BREAST TOMOSYNTHESIS BI: CPT

## 2023-01-09 PROCEDURE — 97110 THERAPEUTIC EXERCISES: CPT

## 2023-01-09 PROCEDURE — 97140 MANUAL THERAPY 1/> REGIONS: CPT

## 2023-01-09 NOTE — PROGRESS NOTES
LYMPHEDEMA PT DAILY TREATMENT NOTE - [facility] 2-15     Patient Name: Osmel Raza  Date: 2023  : 1970  [x]  Patient  Verified  Payor: Trenton Masno / Plan: Danielle Nunes Se HMO / Product Type: HMO /    In time:10:30 am Out time:12:00 pm  Total Treatment Time (min):90  Total Timed Codes (min):90     Visit #: 13    Treatment Area: Lymphedema, not elsewhere classified [I89.0]     SUBJECTIVE:  Pain Level (0-10 scale):General 7/10 all over today from fibromyalgia. Any medication changes, allergies to medications, adverse drug reactions, diagnosis change, or new procedure performed?: [x] No    [x] Yes (see summary sheet for update)  Subjective functional status/changes:   [x] No changes reported   Patient going for mammogram today at 1pm. Swollen/ itchy scalp due to allergies, but is allergic to her own sweat she reports. 8 min Therapeutic Exercise:  [] Corena Lexx Exercises [] Remedial Lymphedema Exercise Program [x] Axillary Web Exercise Program    and over the door pulleys  [] Shoulder ROM Exercises    Rationale: Activate muscle pump to improve lymphatic fluid movement and decrease swelling to improve the patients ability to perform ADL and IADL skills and prevent worsening of swelling over time. 82 min Manual Therapy     Manual Lymphatic Drainage (MLD):  Area to decongest: UE: left and upper quadrant     Patient also with B LE/truncal involvement as well. Sequence used and effectiveness: Modifications were made to manual lymph drainage sequence to exclude cervical techniques secondary to patient's age. 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 Axillary web cording is extensive in the trunk more so now than in the L UE which has improved. Reviewed self soft tissue mobilizations.   Patient struggles with self mobilization due to R shoulder limitations. Skin/wound care/debridement: Reviewed skin care principles:   Skin care products  Hygiene  Prevention of cellulitis    Skin is intact bilateral LEs and left UE. Hyperpigmentation is present in the lower extremities and left upper quadrant. Applied multi-layer compression bandaging to: Deferred. Upper/Lower Extremity Compression:     Utilizing the following compression products:    UE: left LE: bilateral left upper extremity: Arm sleeve, Glove with open fingers, and Top band silicone border and bilateral lower extremity: Knee high Top band silicone border Open toe Nohemy Night garment    Style: Circular knit, Flat-knit, and Foam based    Brand: Medi  Sigvaris  Solaris    Type: Custom: Medi Custom L UE sleeve and glove  CCL2, Medi Custom knee highs CCL2, Custom Sigvaris Nohemy,Custom Tribute Night Garments  lower leg garments. Vendor made clinic aware there was a delay in the availabity of the custom nohemy, so another product may have to be ordered like a Biaflect nohemy in 4 XL to replace the Sigvaris product. Patient understood the issues. Would like to order a [employer] for second compression bra and to get an extender. Miguel Madrigal will be remade and were sent back for assessment due to coming in shorter than measured. Vendor: [employer]    Education: Educated patient in compression garment donning and doffing. Glove use with donning. Daily wear schedule. Daily laundering. Garment lifespan. Return and reordering process (by bringing prior garments into the clinic). Educated patient to monitor for redness or pressure points on the skin. If new pain occurs they should contact their therapist.    Patient/family demonstrated donning and doffing with independence     Patient utilizing advanced vaso-pneumatic device as recommended in the home setting.    Rationale: decrease pain, increase ROM, increase tissue extensibility, decrease edema , and increase postural awareness to improve the patients ability to prevent worsening of swelling and axillary cord symptoms over time. With   [] TE   [] TA   [x] MT   [] SC   [] other: Patient Education: [x] Review HEP and home program  [x] MLD Patient Education Reviewed with patient, as well as demonstration and instruction during MLD portion of the session. Reviewed soft tissue mobilization for axillary web. Dycem provided. [x] Progressed/Changed HEP based on:   [x] positioning   [] Kinesiotape   [x] Skin care   [] wound care   [] other:   Compression garment options for lower extremity  Compression bandaging/garment precautions reviewed: [x] Yes  [] No      Other Objective/Functional Measures:   Deferred today, taken last visit. Will reassess next week. Height:  Height: 5' 9.5\" (176.5 cm)  Weight:  Weight: 150.2 kg (331 lb 3.2 oz)   BMI:  BMI (calculated): 48.2  (36 or greater: adversely affecting lymphedema)     Pain Level (0-10 scale) post treatment: Low back pain at 7/10, hip right 8, upper back/neck 7/10, L axillary cording 5/10, R shoulder 4/10 (fibromyalgia)     ASSESSMENT/Changes in Function:   Patient continues to make gains, but has persistent symptoms of axillary web cording now focused mostly in anterior chest wall, lateral trunk, and posterior trunk inferior and lateral to scapula. L UE cords have improved. Patient continues to struggle with fibromyalgia symptoms today. Patient will continue to work with her products and return next week for follow up.  Patient will continue to benefit from skilled PT services to address swelling, analyze and address soft tissue restrictions, analyze compression product fit and use, assess and modify postural abnormalities, instruct in home lymphedema management program, assess fit and effectiveness of compression products, and modify and progress therapeutic interventions to attain remaining goals.    []  See Plan of Care  []  See progress note/recertification  []  See Discharge Summary         Progress towards goals / Updated goals:  Short Term Goals:to be met in 6 treatments  1. Patient will demonstrate knowledge of signs/symptoms of infections/cellulitis and be independent in skin care to prevent cellulitis. Goal Met 11/28/2022  2. Patient will demonstrate independence in lymphedema home program of therapeutic exercises to improve circulation and decongest limb to improve ADLs. In progress. 3.  Patient will receive appropriately fitting custom compression garments to prevent worsening of swelling over time. Partially met. 4.  Patient will be independent in axillary web cording self soft tissue mobilizations. Met  5:  Therapist will investigate patient's eligibility timeframe for a new vasopneumatic device to address swelling in left UE and bilateral lower extremities. Goal Met 12/01/2022        Long Term Goals:12 treatments  1. Pt will show improvement in the Lymphedema Life Impact Scale by decreasing the score to 20% and thus allow improvement in patient's quality of life. In progress. 2.  Patient will be independent with don/doff of compression system and use in order to prevent reaccumulation of fluid at discharge. Goal Met 12/20/2022  3. Pt will be independent in self-MLD and be pain free with functional mobility for transition to independent restorative phase of lymphedema therapy. In progress.      PLAN  []  Upgrade activities as tolerated     [x] Continue Updated Plan of Care  []  Update interventions per flow sheet       []  Discharge due to:_  []  Other:_        Georges MCARTHUR Patch, PTA, CLT 1/9/2022

## 2023-01-19 ENCOUNTER — HOSPITAL ENCOUNTER (OUTPATIENT)
Dept: PHYSICAL THERAPY | Age: 53
Discharge: HOME | End: 2023-01-19
Payer: COMMERCIAL

## 2023-01-19 VITALS — BODY MASS INDEX: 41.95 KG/M2 | WEIGHT: 293 LBS | HEIGHT: 70 IN

## 2023-01-19 PROCEDURE — 97140 MANUAL THERAPY 1/> REGIONS: CPT

## 2023-01-19 PROCEDURE — 97110 THERAPEUTIC EXERCISES: CPT

## 2023-01-19 NOTE — PROGRESS NOTES
LYMPHEDEMA PT DAILY TREATMENT NOTE - [facility] 2-15     Patient Name: Juan Miguel Banegas  Date: 2023  : 1970  [x]  Patient  Verified  Payor: Sandy Benoit / Plan: [address] HMO / Product Type: HMO /    In time: 12:20pm Out time:1:50 pm  Total Treatment Time (min):90  Total Timed Codes (min):90     Visit #: 14    Treatment Area: Lymphedema, not elsewhere classified [I89.0]     SUBJECTIVE:  Pain Level (0-10 scale):Back 6/10, knees achy, left arm, general 7/10 all over today from fibromyalgia. Any medication changes, allergies to medications, adverse drug reactions, diagnosis change, or new procedure performed?: [x] No    [x] Yes (see summary sheet for update)  Subjective functional status/changes:   [x] No changes reported     Patient received her second set of UE garments. There is an issue with the garments being different design prints so vendor will be made aware. Patient thinks that her lower leg garments may have been delivered today, but had not opened the box before coming to her visit today. Patient reports she has not received her altered/remade night time garments, so will also make vendor aware of this concern. 8 min Therapeutic Exercise:  [] Emma Simper Exercises [] Remedial Lymphedema Exercise Program [x] Axillary Web Exercise Program   [] Shoulder ROM Exercises    Rationale: Activate muscle pump to improve lymphatic fluid movement and decrease swelling to improve the patients ability to perform ADL and IADL skills and prevent worsening of swelling over time. 82 min Manual Therapy     Manual Lymphatic Drainage (MLD):  Area to decongest: UE: left and upper quadrant     Patient also with B LE/truncal involvement as well. Sequence used and effectiveness: Modifications were made to manual lymph drainage sequence to exclude cervical techniques secondary to patient's age.     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 Axillary web cording is extensive in the trunk/L UE. Reviewed self soft tissue mobilizations. Patient struggles with self mobilization due to R shoulder limitations. Skin/wound care/debridement: Reviewed skin care principles:   Skin care products  Hygiene  Prevention of cellulitis    Skin is intact bilateral LEs and left UE. Hyperpigmentation is present in the lower extremities and left upper quadrant. Applied multi-layer compression bandaging to: Deferred. Upper/Lower Extremity Compression:     Utilizing the following compression products:    UE: left LE: bilateral left upper extremity: Arm sleeve, Glove with open fingers, and Top band silicone border and bilateral lower extremity: Knee high Top band silicone border Open toe Nohemy Night garment    Style: Circular knit, Flat-knit, and Foam based    Brand: Medi  Sigvaris  Solaris    Type: Custom: Medi Custom L UE sleeve and glove  CCL2, Medi Custom knee highs CCL2, Custom Sigvaris Nohemy,Custom Tribute Night Garments  lower leg garments. Vendor made clinic aware there was a delay in the availabity of the custom nohemy, so another product may have to be ordered like a Biaflect nohemy in 4 XL to replace the Sigvaris product. Patient understood the issues. Would like to order a Beamly for second compression bra and to get an extender. Jerry Ogden will be remade and were sent back for assessment due to coming in shorter than measured. Vendor: [employer]    Education: Educated patient in compression garment donning and doffing. Glove use with donning. Daily wear schedule. Daily laundering. Garment lifespan. Return and reordering process (by bringing prior garments into the clinic). Educated patient to monitor for redness or pressure points on the skin.   If new pain occurs they should contact their therapist.    Patient/family demonstrated donning and doffing with independence     Patient utilizing advanced vaso-pneumatic device as recommended in the home setting. Second set of garments for UE arrived, but two different prints so will assist patient with resolving this issue. She is to receive a second set of all other products and have her altered night time garments returned once they have been altered/remade. Rationale: decrease pain, increase ROM, increase tissue extensibility, decrease edema , and increase postural awareness to improve the patients ability to prevent worsening of swelling and axillary cord symptoms over time. With   [] TE   [] TA   [x] MT   [] SC   [] other: Patient Education: [x] Review HEP and home program  [x] MLD Patient Education Reviewed with patient, as well as demonstration and instruction during MLD portion of the session. Reviewed soft tissue mobilization for axillary web. Dycem provided. [x] Progressed/Changed HEP based on:   [x] positioning   [] Kinesiotape   [x] Skin care   [] wound care   [] other:   Compression garment options for lower extremity  Compression bandaging/garment precautions reviewed: [x] Yes  [] No      Other Objective/Functional Measures:   Patient did not wear any of her compression for UE/LE in today due to having pain today. Will reassess volumes when she returns next visit and encouraged her to wear in all compression next visit for updated volumes. Last volumes were taken 1/03/2023:  UEs:  -left upper 6,136.54 ml 1/03/2023 compared to 6,416. 59 ml on 12/05/2022 compared to 6,025.19 ml on 8/08/2022.      -right upper  6,029.88 ml 1/03/2023 compared to 5,949.73 ml on 12/05/2022 compared to 5,891.80 ml on 8/08/2022. Percent difference 1/03/2023 is 1.77% as compared to 2.26% on evaluation. LEs  -left lower 13,446.05 ml 1/03/2023 compared to 13,806.75 ml on 12/05/2022. -right lower  13,175.50 ml 1/03/2023 compared to 13,945.37 ml on 12/05/2022. Percent difference 1/03/2023 is 2.05% as compared to -0.99% on evaluation. Affected Side: Left UE today   Left (cm)   Base 3rd finger 7.5      Palm 23.2      Wrist 19      Mid Forearm 35      Elbow 35.7      Axilla 48.5 (46.5  mid bicep)           Truncal Girths 1/19/2023 12/20/2022 12/1/2022   Axilla 120.5 120.2  122.5     Mid Chest 137.5 138  137     Xiphoid 114.8 114  123     Mid abdomen Not assessed today 125  Not assessed on this date      Waist 127.8 130     138     Hips 151 153  148          Height:  Height: 5' 9.5\" (176.5 cm)  Weight:  Weight: 150 kg (330 lb 12.8 oz)   BMI:  BMI (calculated): 48.2  (36 or greater: adversely affecting lymphedema)     Pain Level (0-10 scale) post treatment: Back 6/10, knees achy, left arm:general 7/10 all over today from fibromyalgia. ASSESSMENT/Changes in Function:   Patient continues to struggle with pain issues and exacerbations of fibromyalgia that worsen her symptoms of axillary web cording. Patient was making good gains with cording reduction that was focused mostly in anterior chest wall, lateral trunk, and posterior trunk inferior and lateral to scapula, because the L UE cords had greatly improved. Today all areas of cording were tighter and more sensitive to touch with mobilizations. Patient will continue to benefit from treatment, but encouraged to utilize her compression garments daily as recommended/tolerated and return for follow to assess volumes and progress towards remaining goals. Patient is now awaiting her night time leg garments that were made too short. She also needs to bring in remaining garments that were ordered for her second set so we can ensure she has all items fitting well to prepare for the restorative phase of care.  Patient will continue to benefit from skilled PT services to address swelling, analyze and address soft tissue restrictions, analyze compression product fit and use, assess and modify postural abnormalities, instruct in home lymphedema management program, assess fit and effectiveness of compression products, and modify and progress therapeutic interventions to attain remaining goals. []  See Plan of Care  [x]  See progress note/recertification  []  See Discharge Summary         Progress towards goals / Updated goals:  Short Term Goals:to be met in 6 treatments  1. Patient will demonstrate knowledge of signs/symptoms of infections/cellulitis and be independent in skin care to prevent cellulitis. Goal Met 11/28/2022  2. Patient will demonstrate independence in lymphedema home program of therapeutic exercises to improve circulation and decongest limb to improve ADLs. Goal Met 1/19/2023  3. Patient will receive appropriately fitting custom compression garments to prevent worsening of swelling over time. Partially met. Awaiting return of night time garments and assessment of all second sets of garments. 4.  Patient will be independent in axillary web cording self soft tissue mobilizations. Met  5:  Therapist will investigate patient's eligibility timeframe for a new vasopneumatic device to address swelling in left UE and bilateral lower extremities. Goal Met 12/01/2022        Long Term Goals:12 treatments  1. Pt will show improvement in the Lymphedema Life Impact Scale by decreasing the score to 20% and thus allow improvement in patient's quality of life. In progress. 2.  Patient will be independent with don/doff of compression system and use in order to prevent reaccumulation of fluid at discharge. Goal Met 12/20/2022  3. Pt will be independent in self-MLD and be pain free with functional mobility for transition to independent restorative phase of lymphedema therapy. In progress.      PLAN  []  Upgrade activities as tolerated     [x] Continue Updated Plan of Care  []  Update interventions per flow sheet       []  Discharge due to:_  []  Other:_        Katey Conley C Patch, PTA, CLT 1/19/2023

## 2023-01-26 ENCOUNTER — APPOINTMENT (OUTPATIENT)
Dept: PHYSICAL THERAPY | Age: 53
End: 2023-01-26
Payer: COMMERCIAL

## 2023-01-29 NOTE — PROGRESS NOTES
[facility]  a part of [address] [place] [address] Ryan  [address], [address] [facility], [address]  Phone: [phone]  Fax: [phone]        Progress Note    Name: Savanah Iqbal   : 1970   MD: Yessica Denise*       Treatment Diagnosis: Lymphedema, not elsewhere classified [I89.0]  Start of Care: 2022    Visits from Start of Care: 14  Missed Visits: 7    Summary of Care:Education on skin care, exercise, axillary web stretching, manual lymph drainage, Self MLD, Compression garment measuring/fitting, vasopneumatic device. Assessment / Recommendations:   Patient continues to struggle with pain issues and exacerbations of fibromyalgia that worsen her symptoms of axillary web cording. Patient was making good gains with cording reduction that was focused mostly in anterior chest wall, lateral trunk, and posterior trunk inferior and lateral to scapula, because the L UE cords had greatly improved. Today all areas of cording were tighter and more sensitive to touch with mobilizations. Patient will continue to benefit from treatment, but encouraged to utilize her compression garments daily as recommended/tolerated and return for follow to assess volumes and progress towards remaining goals. Patient is now awaiting her night time leg garments that were made shorter than measured. She also needs to bring in remaining garments that were ordered for her second set so we can ensure she has all items fitting well to prepare for the restorative phase of care. Patient will continue to benefit from skilled PT services to address swelling, analyze and address soft tissue restrictions, analyze compression product fit and use, assess and modify postural abnormalities, instruct in home lymphedema management program, assess fit and effectiveness of compression products, and modify and progress therapeutic interventions to attain remaining goals.       Progress towards goals / Updated goals:  Short Term Goals:to be met in 6 treatments  1. Patient will demonstrate knowledge of signs/symptoms of infections/cellulitis and be independent in skin care to prevent cellulitis. Status at last note/certification: met   Status at progress note: met  2. Patient will demonstrate independence in lymphedema home program of therapeutic exercises to improve circulation and decongest limb to improve ADLs. Status at last note/certification:not met in progress  Status at progress note: met  3. Patient will receive appropriately fitting custom compression garments to prevent worsening of swelling over time. Status at last note/certification:not met in progress  Status at progress note: not met in progress (awaiting remake/alteration and second sets of all products)    4. Patient will be independent in axillary web cording self soft tissue mobilizations  Status at last note/certification: met   Status at progress note: met  5:  Therapist will investigate patient's eligibility timeframe for a new vasopneumatic device to address swelling in left UE and bilateral lower extremities. Status at last note/certification: met   Status at progress note: met        Long Term Goals:12 treatments  1. Pt will show improvement in the Lymphedema Life Impact Scale by decreasing the score to 20% and thus allow improvement in patient's quality of life. Status at last note/certification:not met in progress  Status at progress note: not met in progress  2. Patient will be independent with don/doff of compression system and use in order to prevent reaccumulation of fluid at discharge. Status at last note/certification: met   Status at progress note: met  3. Pt will be independent in self-MLD and be pain free with functional mobility for transition to independent restorative phase of lymphedema therapy. I  Status at last note/certification:not met in progress  Status at progress note: not met in progress      Other: Continue towards remaining goals and prepare for transition to restorative phase of care when appropriate.       Georges Flowers, PTA, CLT 1/19/2023

## 2023-01-30 ENCOUNTER — VIRTUAL VISIT (OUTPATIENT)
Dept: INTERNAL MEDICINE CLINIC | Age: 53
End: 2023-01-30
Payer: COMMERCIAL

## 2023-01-30 DIAGNOSIS — C50.912 MALIGNANT NEOPLASM OF LEFT BREAST IN FEMALE, ESTROGEN RECEPTOR NEGATIVE, UNSPECIFIED SITE OF BREAST (HCC): ICD-10-CM

## 2023-01-30 DIAGNOSIS — M79.7 FIBROMYALGIA: Primary | ICD-10-CM

## 2023-01-30 DIAGNOSIS — E66.01 MORBID OBESITY WITH BMI OF 45.0-49.9, ADULT (HCC): ICD-10-CM

## 2023-01-30 DIAGNOSIS — Z17.1 MALIGNANT NEOPLASM OF LEFT BREAST IN FEMALE, ESTROGEN RECEPTOR NEGATIVE, UNSPECIFIED SITE OF BREAST (HCC): ICD-10-CM

## 2023-01-30 PROCEDURE — 99214 OFFICE O/P EST MOD 30 MIN: CPT | Performed by: INTERNAL MEDICINE

## 2023-01-30 RX ORDER — PREGABALIN 75 MG/1
CAPSULE ORAL
Qty: 60 CAPSULE | Refills: 5 | Status: SHIPPED | OUTPATIENT
Start: 2023-01-30

## 2023-01-30 NOTE — PROGRESS NOTES
Bhaskar Horan, who was evaluated through a synchronous (real-time) audio-video encounter, and/or her healthcare decision maker, is aware that it is a billable service, which includes applicable co-pays, with coverage as determined by her insurance carrier. She provided verbal consent to proceed and patient identification was verified. This visit was conducted pursuant to the emergency declaration under the [address], [address] [place] [address] and the [employer] General Act. A caregiver was present when appropriate. Ability to conduct physical exam was limited. The patient was located at: Home: [address] [facility] [id number]  The provider was located at: Home: Sherly Abdi MD on 1/30/2023 at 7:47 AM    Sheazoecorrie Bazan is a 47 y.o. female. HPI  Here for follow up of Fibromyalgia. Currently on Cymbalta. Increased flares. Feels like hit by a mac truck. Back up neck into skull feels like someone pulling her skin off. Pain arms, legs, joints. Fatigue. Body vibrating. Not sleeping well, both initiation and maintenance. Tried gabapentin in the past but made her sleep too much. Since last visit had HELIO of lumbar spine. Has another scheduled 1/21.       Current Outpatient Medications   Medication Instructions    aspirin-acetaminophen-caffeine (Excedrin Extra Strength) 250-250-65 mg per tablet 2 Tablets, 2 TIMES DAILY    biotin 10,000 mcg cap 1 Tablet, Oral, DAILY    budesonide (RHINOCORT AQUA) 32 mcg/actuation nasal spray 2 Sprays, Both Nostrils, DAILY    cholecalciferol (VITAMIN D3) 5,000 Units, Oral, DAILY    cyanocobalamin 100 mcg, Oral, DAILY    desonide (TRIDESILON) 0.05 % cream APPLY A SMALL AMOUNT TO AFFECTED AREA TWICE A DAY AS NEEDED    dilTIAZem ER (CARDIZEM CD) 180 mg capsule TAKE 1 CAPSULE BY MOUTH EVERY DAY    DULoxetine (CYMBALTA) 60 mg capsule TAKE 1 CAPSULE BY MOUTH EVERY DAY    folic acid 793 mcg, Oral, DAILY    Linzess 290 mcg cap capsule TAKE 1 CAPSULE EVERY MORNING 30 MIN. BEFORE FIRST MEAL    losartan-hydroCHLOROthiazide (HYZAAR) 100-12.5 mg per tablet TAKE 1 TABLET BY MOUTH EVERY DAY    multivitamin capsule 1 Capsule, Oral, DAILY    propylene glycoL (Systane Balance) 0.6 % drop 1 Drop, Ophthalmic, 2 TIMES DAILY AS NEEDED       There were no vitals taken for this visit. Patient-Reported Vitals 11/9/2021   Patient-Reported Weight 335   Patient-Reported Height 5'9\"               ROS  See above  Physical Exam  Vitals reviewed. Constitutional:       Appearance: Normal appearance. HENT:      Head: Normocephalic and atraumatic. Neck:      Comments: Nml appearance  Pulmonary:      Effort: Pulmonary effort is normal.      Comments: Nml rate  Neurological:      Mental Status: She is alert and oriented to person, place, and time. ASSESSMENT and PLAN  Diagnoses and all orders for this visit:    1. Fibromyalgia - not at goal.  Continue Cymbalta. Add Lyrica. Referred rheum. Gentle stretching.    -     pregabalin (LYRICA) 75 mg capsule; Take 1 cap po qhs x 1 week then increase to 1 cap bid.  -     REFERRAL TO RHEUMATOLOGY    2. Morbid obesity with BMI of 45.0-49.9, adult ([facility] [address] - discussed diet and exercise for weight loss. 3. Malignant neoplasm of left breast in female, estrogen receptor negative, unspecified site of breast ([facility] [address] - no sign of recurrence. Utd Dr. Dereje Rolon (now seeing yearly).       F/up 1 month fibromyalgia

## 2023-01-31 ENCOUNTER — HOSPITAL ENCOUNTER (OUTPATIENT)
Dept: PHYSICAL THERAPY | Age: 53
Discharge: HOME | End: 2023-01-31
Payer: COMMERCIAL

## 2023-01-31 PROCEDURE — 97140 MANUAL THERAPY 1/> REGIONS: CPT

## 2023-01-31 NOTE — PROGRESS NOTES
LYMPHEDEMA PT DAILY TREATMENT NOTE - [facility] 2-15     Patient Name: Rolando Perez  Date: 2023  : 1970  [x]  Patient  Verified  Payor: Alan Chen / Plan: [address] HMO / Product Type: HMO /    In time: 12:30 pm Out time:1:50 pm  Total Treatment Time (min):80  Total Timed Codes (min):80     Visit #: 15    Treatment Area: Lymphedema, not elsewhere classified [I89.0]     SUBJECTIVE:  Pain Level (0-10 scale):  R ankle 5/10  B knees 4/10  R thigh/back 5/10  Back 6/10  Shoulders 3/10  Axillary web/ LE 3/10  Lateral trunk/breast-1/10  R shoulder-4/10  Left thumb 9/10    Any medication changes, allergies to medications, adverse drug reactions, diagnosis change, or new procedure performed?: [] No    [x] Yes (see summary sheet for update) Patient reports that she has continued flare ups of her fibromyalgia so she was prescribed Lyrica as well yesterday. See [facility] notes from PCP 2023. Subjective functional status/changes:   [x] No changes reported     Patient reports that she has continued with flare of her fibromyalgia and had missed her last visit due to this. Patient reports that she has received her additional compression products for L UE and her B LE night time garments that were remade. 80 min Manual Therapy     Manual Lymphatic Drainage (MLD):  Area to decongest: UE: left and upper quadrant     Patient also with B LE/truncal involvement as well. Sequence used and effectiveness: Modifications were made to manual lymph drainage sequence to exclude cervical techniques secondary to patient's age. 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 Axillary web cording is extensive in the trunk/L UE. Reviewed self soft tissue mobilizations.   Patient struggles with self mobilization due to R shoulder limitations. Skin/wound care/debridement: Reviewed skin care principles:   Skin care products  Hygiene  Prevention of cellulitis    Skin is intact bilateral LEs and left UE. Hyperpigmentation is present in the lower extremities and left upper quadrant. Applied multi-layer compression bandaging to: Deferred. Upper/Lower Extremity Compression:     Utilizing the following compression products:    UE: left LE: bilateral left upper extremity: Arm sleeve, Glove with open fingers, and Top band silicone border and bilateral lower extremity: Knee high Top band silicone border Open toe Nohemy Night garment    Style: Circular knit, Flat-knit, and Foam based    Brand: Medi  Sigvaris  Solaris    Type: Custom: Medi Custom L UE sleeve and glove  CCL2, Medi Custom knee highs CCL2, Custom Sigvaris Nohemy,Custom Tribute Night Garments  lower leg garments. Vendor made clinic aware there was a delay in the availabity of the custom nohemy, so another product may have to be ordered like a Biaflect nohemy in 4 XL to replace the Sigvaris product. Patient understood the issues. Would like to order a [employer] for second compression bra and to get an extender. Ping Lynch will be remade and were sent back for assessment due to coming in shorter than measured. Vendor: [employer]    Education: Educated patient in compression garment donning and doffing. Glove use with donning. Daily wear schedule. Daily laundering. Garment lifespan. Return and reordering process (by bringing prior garments into the clinic). Educated patient to monitor for redness or pressure points on the skin.   If new pain occurs they should contact their therapist.    Patient/family demonstrated donning and doffing with independence      Rationale: decrease pain, increase ROM, increase tissue extensibility, decrease edema , and increase postural awareness to improve the patients ability to prevent worsening of swelling and axillary cord symptoms over time. With   [] TE   [] TA   [x] MT   [] SC   [] other: Patient Education: [x] Review HEP and home program  [x] MLD Patient Education Reviewed with patient, as well as demonstration and instruction during MLD portion of the session. Reviewed soft tissue mobilization for axillary web. Dycem provided. [x] Progressed/Changed HEP based on:   [x] positioning   [] Kinesiotape   [x] Skin care   [] wound care   [] other:   Compression garment options for lower extremity  Compression bandaging/garment precautions reviewed: [x] Yes  [] No      Other Objective/Functional Measures:     UEs:  -left upper 6030.33 ml today compared to  6,136.54 ml 1/03/2023 compared to 6,416. 59 ml on 12/05/2022 compared to 6,025.19 ml on 8/08/2022.      -right upper  5,878.91 ml today compared to 6,029.88 ml 1/03/2023 compared to 5,949.73 ml on 12/05/2022 compared to 5,891.80 ml on 8/08/2022. Percent difference today 2.58% as compared to 2.26% on evaluation. LEs  -left lower 13,587.52 ml today compared to 13,446.05 ml 1/03/2023 compared to 13,806.75 ml on 12/05/2022.      -right lower  13,409.96 ml today compared to 13,175.50 ml 1/03/2023 compared to 13,945.37 ml on 12/05/2022. Percent difference 1.32% as compared to -0.99% on evaluation. Truncal Girths 1/31/2023 1/19/2023 12/20/2022 12/1/2022   Axilla 117 120.5 120.2  122.5     Mid Chest 134 137.5 138  137     Xiphoid 112 114.8 114  123     Waist 132 127.8 130     138     Hips 156 151 153  148          Height:  Height: 5' 9.5\" (176.5 cm)  Weight:  Weight: 150.6 kg (332 lb)   BMI:  BMI (calculated): 48.3  (36 or greater: adversely affecting lymphedema)    Lymphedema Life Impact Scale: Patient scored a 9 out of 68, which correlates with a 13% impairment.       Pain Level (0-10 scale) post treatment:   R ankle 5/10  B knees 4/10  R thigh/back 5/10  Back 6/10  Shoulders 3/10  Axillary web/ LE 3/10  Lateral trunk/breast-1/10  R shoulder-4/10  Left thumb 9/10      ASSESSMENT/Changes in Function:   Patient continues to struggle with pain issues and exacerbations of fibromyalgia that she has discussed with PCP yesterday. Patient starting Lyrica and is hopeful to get relief from her fibromyalgia symptoms. Today patient was able to meet 2 more of her goals and now is close to being stable for discharge to the restorative phase of care. Patient was to receive all of her compression products for day and night and now will work on her home program and return for follow up next week to work toward remaining goal.Patient will continue to benefit from skilled PT services to address swelling, analyze and address soft tissue restrictions, analyze compression product fit and use, assess and modify postural abnormalities, instruct in home lymphedema management program, assess fit and effectiveness of compression products, and modify and progress therapeutic interventions to attain remaining goals. []  See Plan of Care  []  See progress note/recertification  []  See Discharge Summary         Progress towards goals / Updated goals:  Short Term Goals:to be met in 6 treatments  1. Patient will demonstrate knowledge of signs/symptoms of infections/cellulitis and be independent in skin care to prevent cellulitis. Goal Met 11/28/2022  2. Patient will demonstrate independence in lymphedema home program of therapeutic exercises to improve circulation and decongest limb to improve ADLs. Goal Met 1/19/2023  3. Patient will receive appropriately fitting custom compression garments to prevent worsening of swelling over time. Goal Met 1/31/2023  4. Patient will be independent in axillary web cording self soft tissue mobilizations. Met  5:  Therapist will investigate patient's eligibility timeframe for a new vasopneumatic device to address swelling in left UE and bilateral lower extremities.  Goal Met 12/01/2022        Long Term Goals:12 treatments  1. Pt will show improvement in the Lymphedema Life Impact Scale by decreasing the score to 20% and thus allow improvement in patient's quality of life. Goal Met 1/31/2023.  2.  Patient will be independent with don/doff of compression system and use in order to prevent reaccumulation of fluid at discharge. Goal Met 12/20/2022  3. Pt will be independent in self-MLD and be pain free with functional mobility for transition to independent restorative phase of lymphedema therapy. In progress.      PLAN  []  Upgrade activities as tolerated     [x] Continue Updated Plan of Care  []  Update interventions per flow sheet       []  Discharge due to:_  []  Other:_        Georges MCARTHUR Patch, PTA, CLT 1/31/2023

## 2023-02-04 VITALS — WEIGHT: 293 LBS | BODY MASS INDEX: 41.95 KG/M2 | HEIGHT: 70 IN

## 2023-02-07 ENCOUNTER — TELEPHONE (OUTPATIENT)
Dept: INTERNAL MEDICINE CLINIC | Age: 53
End: 2023-02-07

## 2023-02-07 ENCOUNTER — HOSPITAL ENCOUNTER (OUTPATIENT)
Dept: PHYSICAL THERAPY | Age: 53
Discharge: HOME | End: 2023-02-07
Payer: COMMERCIAL

## 2023-02-07 VITALS — HEIGHT: 70 IN | BODY MASS INDEX: 41.95 KG/M2 | WEIGHT: 293 LBS

## 2023-02-07 DIAGNOSIS — M79.7 FIBROMYALGIA: Primary | ICD-10-CM

## 2023-02-07 PROCEDURE — 97140 MANUAL THERAPY 1/> REGIONS: CPT

## 2023-02-07 NOTE — TELEPHONE ENCOUNTER
Discussed. Dizzyness most likely secondayr to abrupt withdrawal from Cymbalta 60mg every day. Continue Cymbalta 60mg every day and start again Lyrica 75mg qhs x 1 week then increase to bid. Call if dizziness recurs. Dr. Araseli Brush office needs referral prior to scheduling appointment. Will fax over referral and copy of most recent note.

## 2023-02-07 NOTE — PROGRESS NOTES
LYMPHEDEMA PT DAILY TREATMENT NOTE - [facility] 2-15     Patient Name: Taco Mederos  Date: 2023  : 1970  [x]  Patient  Verified  Payor: Alec Royal / Plan: [address] HMO / Product Type: HMO /    In time: 12:20 pm Out time:1:35 pm  Total Treatment Time (min):85  Total Timed Codes (min):85      Visit #: 16    Treatment Area: Lymphedema, not elsewhere classified [I89.0]     SUBJECTIVE:  Pain Level (0-10 scale):  R ankle 2/10  B knees 4/10  R thigh/back 7/10  Back 2/10  Shoulders 2/10  Axillary web/ UE 3/10  Lateral trunk/breast-1/10  R shoulder-2/10  Left thumb 10/10    Any medication changes, allergies to medications, adverse drug reactions, diagnosis change, or new procedure performed?: [] No    [x] Yes (see summary sheet for update) Patient reports that she has continues with flare ups of her fibromyalgia. She was prescribed Lyrica and has had some issues with medication feeling very fatigued, low blood pressure and having migraines. She will be back in touch with PCP. Subjective functional status/changes:   [x] No changes reported   Patient arrived today reporting that she is very fatigued. Patient had taken her medication and slept for over a day. She will be in touch with PCP and also may go to urgent care in regards to her left thumb pain that has not improved as it presents as an orthopedic issue. 85 min Manual Therapy     Manual Lymphatic Drainage (MLD):  Area to decongest: UE: left and upper quadrant     Patient also with B LE/truncal involvement as well. Sequence used and effectiveness: Modifications were made to manual lymph drainage sequence to exclude cervical techniques secondary to patient's age. 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  Axillary web cording is extensive in the trunk, but improving. Reviewed self soft tissue mobilizations. Patient struggles with self mobilization due to R shoulder limitations. Skin/wound care/debridement: Reviewed skin care principles:   Skin care products  Hygiene  Prevention of cellulitis    Skin is intact bilateral LEs and left UE. Hyperpigmentation is present in the lower extremities and left upper quadrant. Applied multi-layer compression bandaging to: Deferred. Upper/Lower Extremity Compression:     Utilizing the following compression products:    UE: left LE: bilateral left upper extremity: Arm sleeve, Glove with open fingers, and Top band silicone border and bilateral lower extremity: Knee high Top band silicone border Open toe Nohemy Night garment    Style: Circular knit, Flat-knit, and Foam based    Brand: Medi  Sigvaris  Solaris    Type: Custom: Medi Custom L UE sleeve and glove  CCL2, Medi Custom knee highs CCL2, Custom Sigvaris Nohemy,Custom Tribute Night Garments  lower leg garments. Darlin Aguirre in 4 XL Patient understood the issues. [employer] for second compression bra and to get an extender. Mili Jimenez were remade and patient has them and reports on concerns with fit now that they were remade. Vendor: [employer]    Education: Educated patient in compression garment donning and doffing. Glove use with donning. Daily wear schedule. Daily laundering. Garment lifespan. Return and reordering process (by bringing prior garments into the clinic). Educated patient to monitor for redness or pressure points on the skin. If new pain occurs they should contact their therapist.    Patient/family demonstrated donning and doffing with independence      Rationale: decrease pain, increase ROM, increase tissue extensibility, decrease edema , and increase postural awareness to improve the patients ability to prevent worsening of swelling and axillary cord symptoms over time. With   [] TE   [] TA   [x] MT   [] SC   [] other: Patient Education: [x] Review HEP and home program  [x] MLD Patient Education Reviewed with patient, as well as demonstration and instruction during MLD portion of the session. Reviewed soft tissue mobilization for axillary web. Dycem provided. [x] Progressed/Changed HEP based on:   [x] positioning   [] Kinesiotape   [x] Skin care   [] wound care   [] other:  Compression bandaging/garment precautions reviewed: [x] Yes  [] No      Other Objective/Functional Measures:   Volumes and girths taken last visit. Height:  Height: 5' 9.5\" (176.5 cm)  Weight:  Weight: 150.7 kg (332 lb 3.2 oz)   BMI:  BMI (calculated): 48.4  (36 or greater: adversely affecting lymphedema)    Pain Level (0-10 scale) post treatment:   R ankle 5/10  B knees 4/10  R thigh/back 5/10  Back 6/10  Shoulders 3/10  Axillary web/ LE 3/10  Lateral trunk/breast 1/10  R shoulder 4/10  Left thumb 9/10      ASSESSMENT/Changes in Function:   Patient has made gains towards her goals and her axillary web symptoms are improved since evaluation. Patient unfortunately has been struggling with a flare up of her fibromyalgia. Patient has started Lyrica from her last visit, but reports that she has been very fatigued, low blood pressure, and migraines. Patient has met her short term goals and 2 out of 3 long term goals, with only one goal remaining. Patient has all of her compression products for L UE, B LEs and trunk and was able to upgrade to advanced vasopneumatic device for home use. Patient needs to work on wearing all of her compression garments consistently, which has been more difficult with fibromyalgia symptoms, so she will follow up next week after wearing her products for reassessment an assessment of her axillary web symptoms. Patient is now is close to being stable for discharge to the restorative phase of care.  Patient will continue to benefit from skilled PT services to address swelling, analyze and address soft tissue restrictions, analyze compression product fit and use, assess and modify postural abnormalities, instruct in home lymphedema management program, assess fit and effectiveness of compression products, and modify and progress therapeutic interventions to attain remaining goals. []  See Plan of Care  [x]  See progress note/recertification  []  See Discharge Summary         Progress towards goals / Updated goals:  Short Term Goals:to be met in 6 treatments  1. Patient will demonstrate knowledge of signs/symptoms of infections/cellulitis and be independent in skin care to prevent cellulitis. Goal Met 11/28/2022  2. Patient will demonstrate independence in lymphedema home program of therapeutic exercises to improve circulation and decongest limb to improve ADLs. Goal Met 1/19/2023  3. Patient will receive appropriately fitting custom compression garments to prevent worsening of swelling over time. Goal Met 1/31/2023  4. Patient will be independent in axillary web cording self soft tissue mobilizations. Met  5:  Therapist will investigate patient's eligibility timeframe for a new vasopneumatic device to address swelling in left UE and bilateral lower extremities. Goal Met 12/01/2022        Long Term Goals:12 treatments  1. Pt will show improvement in the Lymphedema Life Impact Scale by decreasing the score to 20% and thus allow improvement in patient's quality of life. Goal Met 1/31/2023.  2.  Patient will be independent with don/doff of compression system and use in order to prevent reaccumulation of fluid at discharge. Goal Met 12/20/2022  3. Pt will be independent in self-MLD and be pain free with functional mobility for transition to independent restorative phase of lymphedema therapy. In progress.      PLAN  []  Upgrade activities as tolerated     [x] Continue Updated Plan of Care  []  Update interventions per flow sheet       []  Discharge due to:_  []  Other:_ Georges Flowers, PTA, CLT 2/7/2023

## 2023-02-07 NOTE — TELEPHONE ENCOUNTER
----- Message from Charleen Askew LPN sent at 8/1/5102 11:52 AM EST -----  Regarding: FW: Medication issue    ----- Message -----  From: Justus Tobar  Sent: 2/6/2023  11:43 AM EST  To: Marline Benavidez Pool  Subject: Medication issue                                 I stopped taking the Duloxetine two days before I started taking Pregablin. I started feeling dizzy right before I started Pregablin. I tried calling the office on Friday; however,  the office was closed. I called [facility] to ask if the dizzyness was a symptom of Duloxetine or Pregablin. He said it was a side effect of Duloxetine; however,  it should not have lasted that long. I started back on the Duloxetine on Saturday because the dizziness was unbearable and started causing a headache in addition it dizziness. Can you call me to discuss what I should do?     Thanks,  Ericka   [phone]

## 2023-02-12 NOTE — PROGRESS NOTES
[facility]  [address] [facility], [address] [place] [address], [facility] [address].    Continued Plan of Care/ Re-certification for Physical or Occupational Therapy Services    [address] [facility]   Provider # [id number]                    Diagnosis: Lymphedema, not elsewhere classified [I89.0]  Onset Date: referral 10/20/2021  Prior Hospitalization: See medical history     Visits from Start of Care: 16     Missed Visits: 8  Start of Care: 8/08/2022  Prior Level of Function: Modified [place]    The Plan of Care and following information is based on the patient's current status:  Short Term Goals:to be met in 6 treatments  1. Patient will demonstrate knowledge of signs/symptoms of infections/cellulitis and be independent in skin care to prevent cellulitis. Status at last note/certification: met   Status at progress note: met  2. Patient will demonstrate independence in lymphedema home program of therapeutic exercises to improve circulation and decongest limb to improve ADLs. Status at last note/certification:not met in progress  Status at progress note: met  3. Patient will receive appropriately fitting custom compression garments to prevent worsening of swelling over time. Status at last note/certification:not met in progress  Status at progress note:met   4. Patient will be independent in axillary web cording self soft tissue mobilizations  Status at last note/certification: met   Status at progress note: met  5:  Therapist will investigate patient's eligibility timeframe for a new vasopneumatic device to address swelling in left UE and bilateral lower extremities. Status at last note/certification: met   Status at progress note: met     Long Term Goals:12 treatments  1. Pt will show improvement in the Lymphedema Life Impact Scale by decreasing the score to 20% and thus allow improvement in patient's quality of life.   Status at last note/certification:not met in progress  Status at progress note: met  2. Patient will be independent with don/doff of compression system and use in order to prevent reaccumulation of fluid at discharge. Status at last note/certification: met   Status at progress note: met  3. Pt will be independent in self-MLD and be pain free with functional mobility for transition to independent restorative phase of lymphedema therapy. Status at last note/certification:not met in progress  Status at progress note: not met in progress    Key functional changes:   UEs:  -left upper 6030.33 ml 1/31/2023 compared to  6,136.54 ml 1/03/2023 compared to 6,416. 59 ml on 12/05/2022 compared to 6,025.19 ml on 8/08/2022.      -right upper  5,878.91 ml 1/31/2023 compared to 6,029.88 ml 1/03/2023 compared to 5,949.73 ml on 12/05/2022 compared to 5,891.80 ml on 8/08/2022. Percent difference 1/31/2023 2.58% as compared to 2.26% on evaluation. LEs  -left lower 13,587.52 ml 1/31/2023 compared to 13,446.05 ml 1/03/2023 compared to 13,806.75 ml on 12/05/2022.      -right lower  13,409.96 ml 1/23/2023 compared to 13,175.50 ml 1/03/2023 compared to 13,945.37 ml on 12/05/2022. Percent difference 1.32% 1/23/2023 as compared to -0.99% on evaluation. Truncal Girths 1/31/2023 1/19/2023 12/20/2022 12/1/2022   Axilla 117 120.5 120.2  122.5      Mid Chest 134 137.5 138  137      Xiphoid 112 114.8 114  123      Waist 132 127.8 130     138      Hips 156 151 153  148         Lymphedema Life Impact Score:  Patient scored a 9 out of 68 with a 13% impairment on 1/31/2023. Patient had scored 16 out of 64 with 25% impairment on evaluation 8/22/2022. Problems/ barriers to goal attainment: Patient has had other medical issues that have slowed progression towards goals. Problem List: pain affecting function and edema affecting function    Treatment Plan: Therapeutic exercise, Manual therapy, Vasopneumatic device, and Other: Compression garment assessment.       Goals for this certification period to be accomplished in 4 treatments:  Remaining long term goal:  3. Pt will be independent in self-MLD and be pain free with functional mobility for transition to independent restorative phase of lymphedema therapy. Frequency / Duration: Patient to be seen 1-2 times per month for 3 months to work toward remaining goal:    Assessment / Recommendations:  Patient has made gains towards her goals and her axillary web symptoms are improved since evaluation. Patient unfortunately has been struggling with a flare up of her fibromyalgia. Patient has started Lyrica from her last visit, but reports that she has been very fatigued, low blood pressure, and migraines. Patient has met short term goals and 2 out of 3 long term goals, with only one goal remaining. Patient has all of her compression products for L UE, B LEs and trunk and was able to upgrade to advanced vasopneumatic device for home use. Patient needs to work on wearing all of her compression garments consistently, which has been more difficult with fibromyalgia symptoms, so she will follow up next week after wearing her products for reassessment an assessment of her axillary web symptoms. Patient is now is close to being stable for discharge to the restorative phase of care. Patient will continue to benefit from skilled PT services to address swelling, analyze and address soft tissue restrictions, analyze compression product fit and use, assess and modify postural abnormalities, instruct in home lymphedema management program, assess fit and effectiveness of compression products, and modify and progress therapeutic interventions to attain remaining goals. Certification Period: 2/7/2023 to 5/8/2023    Georges Flowers, PTA, CLT 2/7/2023    ________________________________________________________________________  I certify that the above Therapy Services are being furnished while the patient is under my care.  I agree with the treatment plan and certify that this therapy is necessary. Y or N I have read the above and request that my patient continue as recommended.   Y or N I have read the above report and request that my patient continue therapy with the following changes/special instructions  Y or N I have read the above report and request that my patient be discharged from therapy    Physician's Signature:_________________ Date:___________Time:__________           Karey Councilman, Lamonte Barker*

## 2023-02-14 ENCOUNTER — HOSPITAL ENCOUNTER (OUTPATIENT)
Dept: PHYSICAL THERAPY | Age: 53
Discharge: HOME | End: 2023-02-14
Payer: COMMERCIAL

## 2023-02-14 PROCEDURE — 97140 MANUAL THERAPY 1/> REGIONS: CPT

## 2023-02-14 NOTE — PROGRESS NOTES
LYMPHEDEMA PT DAILY TREATMENT NOTE - [facility] -15     Patient Name: Sid Rivera  Date: 2023  : 1970  [x]  Patient  Verified  Payor: Eleonora Barrera / Plan: [address] HMO / Product Type: HMO /    In time: 12:20 pm Out time:  Total Treatment Time (min): Total Timed Codes (min):     Visit #: 17    Treatment Area: Lymphedema, not elsewhere classified [I89.0]     SUBJECTIVE:  Pain Level (0-10 scale):  R ankle 1/10  B knees 4/10  R thigh/back 6/10  Back 2/10  Shoulders L 1/10 and R 3/10  Axillary web/ UE 3/10  Lateral trunk/breast-3/10  Left thumb 9/10    Any medication changes, allergies to medications, adverse drug reactions, diagnosis change, or new procedure performed?: [] No    [x] Yes (see summary sheet for update) Patient reports that she continues with flare ups of her fibromyalgia. She was prescribed Lyrica and has had some issues with medication feeling very fatigued, low blood pressure and having migraines. She will be back in touch with PCP. Subjective functional status/changes:   [x] No changes reported     [facility] consult. [address] [place] [address] for L thumb assessment. No more dizziness but fatigue. 85 min Manual Therapy     Manual Lymphatic Drainage (MLD):  Area to decongest: UE: left and upper quadrant     Patient also with B LE/truncal involvement as well. Sequence used and effectiveness: Modifications were made to manual lymph drainage sequence to exclude cervical techniques secondary to patient's age. 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 Axillary web cording is extensive in the trunk, but improving. Reviewed self soft tissue mobilizations. Patient struggles with self mobilization due to R shoulder limitations.    Skin/wound care/debridement: Reviewed skin care principles:   Skin care products  Hygiene  Prevention of cellulitis    Skin is intact bilateral LEs and left UE. Hyperpigmentation is present in the lower extremities and left upper quadrant. Applied multi-layer compression bandaging to: Deferred. Upper/Lower Extremity Compression:     Utilizing the following compression products:    UE: left LE: bilateral left upper extremity: Arm sleeve, Glove with open fingers, and Top band silicone border and bilateral lower extremity: Knee high Top band silicone border Open toe Nohemy Night garment    Style: Circular knit, Flat-knit, and Foam based    Brand: Medi  Sigvaris  Solaris    Type: Custom: Medi Custom L UE sleeve and glove  CCL2, Medi Custom knee highs CCL2, Custom Sigvaris Nohemy,Custom Tribute Night Garments  lower leg garments. Paradise Gunn in 4 XL Patient understood the issues. [employer] for second compression bra and to get an extender. Meg Cole were remade and patient has them and reports on concerns with fit now that they were remade. Vendor: [employer]    Education: Educated patient in compression garment donning and doffing. Glove use with donning. Daily wear schedule. Daily laundering. Garment lifespan. Return and reordering process (by bringing prior garments into the clinic). Educated patient to monitor for redness or pressure points on the skin. If new pain occurs they should contact their therapist.    Patient/family demonstrated donning and doffing with independence      Rationale: decrease pain, increase ROM, increase tissue extensibility, decrease edema , and increase postural awareness to improve the patients ability to prevent worsening of swelling and axillary cord symptoms over time.                                                               With   [] TE   [] TA   [x] MT   [] SC   [] other: Patient Education: [x] Review HEP and home program  [x] MLD Patient Education Reviewed with patient, as well as demonstration and instruction during MLD portion of the session. Reviewed soft tissue mobilization for axillary web. Dycem provided. [x] Progressed/Changed HEP based on:   [x] positioning   [] Kinesiotape   [x] Skin care   [] wound care   [] other:  Compression bandaging/garment precautions reviewed: [x] Yes  [] No      Other Objective/Functional Measures:   Volumes and girths taken last visit. Height:     Weight:      BMI:     (36 or greater: adversely affecting lymphedema)    Pain Level (0-10 scale) post treatment:   R ankle 5/10  B knees 4/10  R thigh/back 5/10  Back 6/10  Shoulders 3/10  Axillary web/ LE 3/10  Lateral trunk/breast 1/10  R shoulder 4/10  Left thumb 9/10      ASSESSMENT/Changes in Function:   Patient has made gains towards her goals and her axillary web symptoms are improved since evaluation. Patient unfortunately has been struggling with a flare up of her fibromyalgia. She has started Lyrica since her last visit, but reports that she has been very fatigued, low blood pressure, and migraines. Patient has met her short term goals and 2 out of 3 long term goals, with only one goal remaining. Patient has all of her compression products for L UE, B LEs and trunk and was able to upgrade to advanced vasopneumatic device for home use. Patient needs to work on wearing all of her compression garments consistently, which has been more difficult with fibromyalgia symptoms, so she will follow up next week after wearing her products for reassessment and assessment of her axillary web symptoms. She is now is close to being stable for discharge to the restorative phase of care.  Patient will continue to benefit from skilled PT services to address swelling, analyze and address soft tissue restrictions, analyze compression product fit and use, assess and modify postural abnormalities, instruct in home lymphedema management program, assess fit and effectiveness of compression products, and modify and progress therapeutic interventions to attain remaining goals. []  See Plan of Care  [x]  See progress note/recertification  []  See Discharge Summary         Progress towards goals / Updated goals:  Short Term Goals:to be met in 6 treatments  1. Patient will demonstrate knowledge of signs/symptoms of infections/cellulitis and be independent in skin care to prevent cellulitis. Goal Met 11/28/2022  2. Patient will demonstrate independence in lymphedema home program of therapeutic exercises to improve circulation and decongest limb to improve ADLs. Goal Met 1/19/2023  3. Patient will receive appropriately fitting custom compression garments to prevent worsening of swelling over time. Goal Met 1/31/2023  4. Patient will be independent in axillary web cording self soft tissue mobilizations. Met  5:  Therapist will investigate patient's eligibility timeframe for a new vasopneumatic device to address swelling in left UE and bilateral lower extremities. Goal Met 12/01/2022        Long Term Goals:12 treatments  1. Pt will show improvement in the Lymphedema Life Impact Scale by decreasing the score to 20% and thus allow improvement in patient's quality of life. Goal Met 1/31/2023.  2.  Patient will be independent with don/doff of compression system and use in order to prevent reaccumulation of fluid at discharge. Goal Met 12/20/2022  3. Pt will be independent in self-MLD and be pain free with functional mobility for transition to independent restorative phase of lymphedema therapy. In progress.      PLAN  []  Upgrade activities as tolerated     [x] Continue Updated Plan of Care  []  Update interventions per flow sheet       []  Discharge due to:_  []  Other:_        Georges Flowers, PTA, CLT 2/7/2023  RAZ GarciaT, LAVELLE

## 2023-05-01 ENCOUNTER — APPOINTMENT (OUTPATIENT)
Facility: HOSPITAL | Age: 53
End: 2023-05-01
Payer: COMMERCIAL

## 2023-05-02 ENCOUNTER — HOSPITAL ENCOUNTER (OUTPATIENT)
Dept: PHYSICAL THERAPY | Age: 53
Discharge: HOME | End: 2023-05-02
Payer: COMMERCIAL

## 2023-05-02 PROCEDURE — 97140 MANUAL THERAPY 1/> REGIONS: CPT

## 2023-05-02 PROCEDURE — 9990 CHARGE CONVERSION

## 2023-05-02 PROCEDURE — 97535 SELF CARE MNGMENT TRAINING: CPT

## 2023-05-05 VITALS — WEIGHT: 293 LBS | BODY MASS INDEX: 41.95 KG/M2 | HEIGHT: 70 IN

## 2023-06-21 RX ORDER — LOSARTAN POTASSIUM AND HYDROCHLOROTHIAZIDE 12.5; 1 MG/1; MG/1
TABLET ORAL
Qty: 90 TABLET | Refills: 1 | Status: SHIPPED | OUTPATIENT
Start: 2023-06-21

## 2023-06-21 RX ORDER — DULOXETIN HYDROCHLORIDE 60 MG/1
CAPSULE, DELAYED RELEASE ORAL
Qty: 90 CAPSULE | Refills: 1 | Status: SHIPPED | OUTPATIENT
Start: 2023-06-21

## 2023-08-10 DIAGNOSIS — M79.7 FIBROMYALGIA: Primary | ICD-10-CM

## 2023-08-11 RX ORDER — PREGABALIN 75 MG/1
75 CAPSULE ORAL 2 TIMES DAILY
Qty: 180 CAPSULE | Refills: 1 | Status: SHIPPED | OUTPATIENT
Start: 2023-08-11 | End: 2024-02-07

## 2023-08-28 ENCOUNTER — APPOINTMENT (OUTPATIENT)
Facility: HOSPITAL | Age: 53
End: 2023-08-28
Payer: COMMERCIAL

## 2023-08-28 ENCOUNTER — HOSPITAL ENCOUNTER (EMERGENCY)
Facility: HOSPITAL | Age: 53
Discharge: HOME | End: 2023-08-28
Attending: STUDENT IN AN ORGANIZED HEALTH CARE EDUCATION/TRAINING PROGRAM
Payer: COMMERCIAL

## 2023-08-28 VITALS
HEART RATE: 83 BPM | TEMPERATURE: 98.7 F | HEIGHT: 69 IN | OXYGEN SATURATION: 99 % | BODY MASS INDEX: 43.4 KG/M2 | SYSTOLIC BLOOD PRESSURE: 163 MMHG | RESPIRATION RATE: 18 BRPM | WEIGHT: 293 LBS | DIASTOLIC BLOOD PRESSURE: 100 MMHG

## 2023-08-28 DIAGNOSIS — U07.1 COVID-19: Primary | ICD-10-CM

## 2023-08-28 PROCEDURE — 2709999900 HC NON-CHARGEABLE SUPPLY

## 2023-08-28 PROCEDURE — 96372 THER/PROPH/DIAG INJ SC/IM: CPT

## 2023-08-28 PROCEDURE — 6360000002 HC RX W HCPCS: Performed by: STUDENT IN AN ORGANIZED HEALTH CARE EDUCATION/TRAINING PROGRAM

## 2023-08-28 PROCEDURE — 99284 EMERGENCY DEPT VISIT MOD MDM: CPT

## 2023-08-28 PROCEDURE — 6370000000 HC RX 637 (ALT 250 FOR IP): Performed by: STUDENT IN AN ORGANIZED HEALTH CARE EDUCATION/TRAINING PROGRAM

## 2023-08-28 PROCEDURE — 71045 X-RAY EXAM CHEST 1 VIEW: CPT

## 2023-08-28 RX ORDER — CETIRIZINE HYDROCHLORIDE 10 MG/1
10 TABLET ORAL NIGHTLY
Qty: 30 TABLET | Refills: 0 | Status: SHIPPED | OUTPATIENT
Start: 2023-08-28 | End: 2023-09-18

## 2023-08-28 RX ORDER — OXYMETAZOLINE HYDROCHLORIDE 0.05 G/100ML
2 SPRAY NASAL 2 TIMES DAILY
Qty: 2 ML | Refills: 0 | Status: SHIPPED | OUTPATIENT
Start: 2023-08-28 | End: 2023-08-31

## 2023-08-28 RX ORDER — KETOROLAC TROMETHAMINE 30 MG/ML
30 INJECTION, SOLUTION INTRAMUSCULAR; INTRAVENOUS
Status: COMPLETED | OUTPATIENT
Start: 2023-08-28 | End: 2023-08-28

## 2023-08-28 RX ORDER — IBUPROFEN 400 MG/1
400 TABLET ORAL EVERY 6 HOURS PRN
Qty: 30 TABLET | Refills: 0 | Status: SHIPPED | OUTPATIENT
Start: 2023-08-28

## 2023-08-28 RX ORDER — ACETAMINOPHEN 500 MG
1000 TABLET ORAL EVERY 6 HOURS PRN
Qty: 30 TABLET | Refills: 0 | Status: SHIPPED | OUTPATIENT
Start: 2023-08-28

## 2023-08-28 RX ORDER — BENZONATATE 100 MG/1
200 CAPSULE ORAL ONCE
Status: COMPLETED | OUTPATIENT
Start: 2023-08-28 | End: 2023-08-28

## 2023-08-28 RX ORDER — BENZONATATE 200 MG/1
200 CAPSULE ORAL 3 TIMES DAILY PRN
Qty: 30 CAPSULE | Refills: 0 | Status: SHIPPED | OUTPATIENT
Start: 2023-08-28 | End: 2023-09-04

## 2023-08-28 RX ORDER — FLUTICASONE PROPIONATE 50 MCG
2 SPRAY, SUSPENSION (ML) NASAL DAILY
Qty: 16 G | Refills: 0 | Status: SHIPPED | OUTPATIENT
Start: 2023-08-28

## 2023-08-28 RX ORDER — DEXAMETHASONE SODIUM PHOSPHATE 10 MG/ML
10 INJECTION, SOLUTION INTRAMUSCULAR; INTRAVENOUS ONCE
Status: COMPLETED | OUTPATIENT
Start: 2023-08-28 | End: 2023-08-28

## 2023-08-28 RX ADMIN — DEXAMETHASONE SODIUM PHOSPHATE 10 MG: 10 INJECTION, SOLUTION INTRAMUSCULAR; INTRAVENOUS at 15:32

## 2023-08-28 RX ADMIN — BENZONATATE 200 MG: 100 CAPSULE ORAL at 15:22

## 2023-08-28 RX ADMIN — KETOROLAC TROMETHAMINE 30 MG: 30 INJECTION, SOLUTION INTRAMUSCULAR; INTRAVENOUS at 15:32

## 2023-08-28 ASSESSMENT — PAIN - FUNCTIONAL ASSESSMENT: PAIN_FUNCTIONAL_ASSESSMENT: NONE - DENIES PAIN

## 2023-08-28 ASSESSMENT — LIFESTYLE VARIABLES
HOW MANY STANDARD DRINKS CONTAINING ALCOHOL DO YOU HAVE ON A TYPICAL DAY: PATIENT DOES NOT DRINK
HOW OFTEN DO YOU HAVE A DRINK CONTAINING ALCOHOL: NEVER

## 2023-08-28 ASSESSMENT — PAIN DESCRIPTION - LOCATION: LOCATION: THROAT

## 2023-08-28 NOTE — ED NOTES
Patient does not appear to be in any acute distress/shows no evidence of clinical instability at this time. Reviewed discharge instructions, prescriptions, education and follow up information with patient. Patient verbalizing understanding. Patient at baseline cardiac, respiratory, and neuro function. Pain controlled. Patient left ER under baseline transfer modality.        Denny Otero LPN  03/42/07 [id number]

## 2023-08-28 NOTE — ED PROVIDER NOTES
Positive For Covid-19      INITIAL ASSESSMENT & PLAN   3:14 PM EDT  Differential diagnosis includes but is not limited to COVID, flu, pneumonia, pneumothorax, exacerbation of COPD, exacerbation of CHF, anemia, ACS/MI, PE    Patient extremely well-appearing. She essentially has upper respiratory infectious symptoms. She is not requiring supplemental oxygen on examination even with ambulation. X-ray is actually unremarkable. In the setting, will treat essentially conservatively as a typical URI. Was given a single dose of Decadron given however sore throat but does not need prescriptions for such. Her prescription for conservative treatment as well. Advised good pulmonary toilet and follow-up with primary care physician    I have obtained additional independent history from:   I have personally reviewed patient's NON-[facility] ED external prior records from:  --[employer] summarizing: michael kuhn seen  yestrerday for pharyngitis      HISTORY OF PRESENT ILLNESS   Additional independent historian:    23-year-old female, does have history of elevated BMI, presenting for chief complaint of testing positive for COVID yesterday. However, she actually has had symptoms for 1 week. She reports cough, sore throat. Does report some fatigue, body aches. Shortness of breath is not worse with any sort of exertion. No chest pain. REVIEW OF SYSTEMS  Review of Systems   performed a 10-point review of systems which have been otherwise included in the HPI as documented, otherwise all other systems have been reviewed and are negative. MEDICAL HISTORY  History reviewed. No pertinent past medical history. Past Surgical History:   Procedure Laterality Date    CT BIOPSY RENAL  2/8/2019    CT BIOPSY RENAL [facility] HISTORICAL     History reviewed. No pertinent family history.   Social History     Tobacco Use    Smoking status: Never    Smokeless tobacco: Never   Substance Use Topics    Drug use: Never

## 2023-08-28 NOTE — ED TRIAGE NOTES
Pt states she [facility].   C/o productive cough x 1 week with yellow/green sputum.

## 2023-08-28 NOTE — DISCHARGE INSTRUCTIONS
There is no cure for covid    You can take up to 1,000mg (two extra strength or three regular strength tablets) tylenol, also known as acetaminophen, every 6 hours for pain and/or fever. You can also take up to 800mg (four regular strength tablets) of ibuprofen, also known as motrin or advil, every 6 hours for pain and/or fever. You can alternate these so you have something every three hours. Be sure to take ibuprofen with plenty of food and water as it can cause stomach irritation. If taking consistently for more than three days, you should consider decreasing ibuprofen to 400mg. Do not take for more than 10 days straight.     You should continue walking frequently to help expand and exercise your lungs and do not stay in bed doing nothing as this will result in worsening lung damage

## 2023-09-01 ENCOUNTER — TELEPHONE (OUTPATIENT)
Age: 53
End: 2023-09-01

## 2023-09-01 NOTE — TELEPHONE ENCOUNTER
Beverly Crabtree from [facility] called inquiring if pt was scheduled for excision. Call back number is [phone]. Office closes at 12:30pm on Fridays.

## 2023-09-17 NOTE — PROGRESS NOTES
[facility] at [address] , [address] [facility]: [phone]  F: [phone]    Reason for Visit:   Baljinder Sultana is a 47 y.o. female who is seen for fu breast cancer. Treatment History:    Breast biopsy   Lumpectomy 2016   AC-T chemo / XRT. STAGE: 3 by report triple negative    History of Present Illness:     Pt seen today for office fu breast cancer. Pt was seen by Derm and had atypical vascular lesion on LEFT breast/ to see breast surgery. Did not have treatment here. No fevers/ chills/ chest pain/ SOB/ nausea/ vomiting/diarrhea/ pain/fatigue        No past medical history on file. Past Surgical History:   Procedure Laterality Date    CT BIOPSY RENAL  2/8/2019    CT BIOPSY RENAL [facility] HISTORICAL      Social History     Tobacco Use    Smoking status: Never    Smokeless tobacco: Never   Substance Use Topics    Alcohol use: Not on file      No family history on file. Current Outpatient Medications   Medication Sig    Fexofenadine HCl (ALLEGRA ALLERGY PO) Take by mouth    Multiple Vitamin (MULTIVITAMIN ADULT PO) Take by mouth    ibuprofen (IBU) 400 MG tablet Take 1 tablet by mouth every 6 hours as needed for Pain    acetaminophen (TYLENOL) 500 MG tablet Take 2 tablets by mouth every 6 hours as needed for Pain or Fever    fluticasone (FLONASE) 50 MCG/ACT nasal spray 2 sprays by Each Nostril route daily    pregabalin (LYRICA) 75 MG capsule Take 1 capsule by mouth 2 times daily for 180 days.  Max Daily Amount: 150 mg    losartan-hydroCHLOROthiazide (HYZAAR) 100-12.5 MG per tablet TAKE 1 TABLET BY MOUTH EVERY DAY    DULoxetine (CYMBALTA) 60 MG extended release capsule TAKE 1 CAPSULE BY MOUTH EVERY DAY    aspirin-acetaminophen-caffeine (EXCEDRIN MIGRAINE) 880-129-95 MG per tablet 2 tablets 2 times daily    Biotin 10 MG CAPS Take 1 tablet by mouth daily    vitamin D3 (CHOLECALCIFEROL) 125 MCG (5000 UT) TABS tablet Take 1 tablet by mouth daily

## 2023-09-18 ENCOUNTER — OFFICE VISIT (OUTPATIENT)
Age: 53
End: 2023-09-18
Payer: COMMERCIAL

## 2023-09-18 VITALS
HEART RATE: 79 BPM | DIASTOLIC BLOOD PRESSURE: 80 MMHG | WEIGHT: 293 LBS | SYSTOLIC BLOOD PRESSURE: 128 MMHG | OXYGEN SATURATION: 96 % | BODY MASS INDEX: 49.91 KG/M2 | RESPIRATION RATE: 18 BRPM | TEMPERATURE: 98 F

## 2023-09-18 DIAGNOSIS — I10 PRIMARY HYPERTENSION: ICD-10-CM

## 2023-09-18 DIAGNOSIS — K58.9 IRRITABLE BOWEL SYNDROME, UNSPECIFIED TYPE: ICD-10-CM

## 2023-09-18 DIAGNOSIS — G89.29 CHRONIC BILATERAL BACK PAIN, UNSPECIFIED BACK LOCATION: ICD-10-CM

## 2023-09-18 DIAGNOSIS — C50.919 TRIPLE NEGATIVE BREAST CANCER (HCC): Primary | ICD-10-CM

## 2023-09-18 DIAGNOSIS — C50.919 TRIPLE NEGATIVE BREAST CANCER (HCC): ICD-10-CM

## 2023-09-18 DIAGNOSIS — E66.01 MORBID OBESITY (HCC): ICD-10-CM

## 2023-09-18 DIAGNOSIS — M54.9 CHRONIC BILATERAL BACK PAIN, UNSPECIFIED BACK LOCATION: ICD-10-CM

## 2023-09-18 DIAGNOSIS — M79.7 FIBROMYALGIA: ICD-10-CM

## 2023-09-18 DIAGNOSIS — K57.90 DIVERTICULOSIS OF INTESTINE, PART UNSPECIFIED, WITHOUT PERFORATION OR ABSCESS WITHOUT BLEEDING: ICD-10-CM

## 2023-09-18 DIAGNOSIS — Z85.3 PERSONAL HISTORY OF MALIGNANT NEOPLASM OF BREAST: ICD-10-CM

## 2023-09-18 DIAGNOSIS — Z98.84 HX OF LAPAROSCOPIC GASTRIC BANDING: ICD-10-CM

## 2023-09-18 PROCEDURE — 3079F DIAST BP 80-89 MM HG: CPT | Performed by: INTERNAL MEDICINE

## 2023-09-18 PROCEDURE — 3074F SYST BP LT 130 MM HG: CPT | Performed by: INTERNAL MEDICINE

## 2023-09-18 PROCEDURE — 99214 OFFICE O/P EST MOD 30 MIN: CPT | Performed by: INTERNAL MEDICINE

## 2023-09-18 NOTE — PROGRESS NOTES
Roro Montana is a 47 y.o. female    Chief Complaint   Patient presents with    Follow-up      breast cancer       1. Have you been to the ER, urgent care clinic since your last visit? Hospitalized since your last visit? No    2. Have you seen or consulted any other health care providers outside of the [address] [place] [address] since your last visit? Include any pap smears or colon screening.  Yes, Dr. Keith Escamilla, Dr. Rafaela Tafoya, Dr. Be Rodriguez Specialist

## 2023-09-20 ENCOUNTER — OFFICE VISIT (OUTPATIENT)
Age: 53
End: 2023-09-20
Payer: COMMERCIAL

## 2023-09-20 ENCOUNTER — TELEPHONE (OUTPATIENT)
Age: 53
End: 2023-09-20

## 2023-09-20 VITALS — BODY MASS INDEX: 43.4 KG/M2 | WEIGHT: 293 LBS | HEIGHT: 69 IN

## 2023-09-20 DIAGNOSIS — Z85.3 HISTORY OF BREAST CANCER: Primary | ICD-10-CM

## 2023-09-20 PROCEDURE — 99203 OFFICE O/P NEW LOW 30 MIN: CPT | Performed by: SURGERY

## 2023-09-20 NOTE — PROGRESS NOTES
HISTORY OF PRESENT ILLNESS  Corinne Andre is a 47 y.o. female. HPI  NEW patient consult referred by  Dr. Pita Adams for LEFT breast skin lesion. In Lymphedema clinic noticed skin tag. Biopsy done by dermatologist.  2nd biopsy recommended. Breast History:     LEFT breast cancer  7/21/2016-triple negative. Had 6 months of Chemo. .last treatment   1/15/2017     Had 35 sessions of Radiation last day was 4/12/2017     She see's Dr. Humberto Diaz        Family History:        Mammogram, 1/9/23, BIRADS 2  Mammogram Result (most recent):  [facility] MARY BETH DIGITAL SCREEN BILATERAL 01/09/2023     Narrative  This is a summary report. The complete report is available in the patient's medical record. If you cannot access the medical record, please contact the sending organization for a detailed fax or copy. STUDY: Bilateral digital screening mammogram with 3-D tomosynthesis     INDICATION:  Screening. COMPARISON: Prior studies dating back to 2016     BREAST COMPOSITION: There are scattered areas of fibroglandular density. FINDINGS: Bilateral digital screening mammography was performed and is  interpreted in conjunction with a computer assisted detection (CAD) system. Additionally, tomosynthesis of both breasts in the CC and MLO projections was  performed. There are chronic lumpectomy changes in the left axillary tail. No  suspicious masses or calcifications are identified. There has been no  significant change. Impression  BI-RADS 2: Benign. No mammographic evidence of malignancy. RECOMMENDATIONS:  Next screening mammogram is recommended in one year. The patient will be notified of these results. No past medical history on file.     Past Surgical History:   Procedure Laterality Date    CT BIOPSY RENAL  2/8/2019    [place] [facility] [place] [facility]       Social History     Socioeconomic History    Marital status: Single     Spouse name: Not on file    Number of children: Not on file    Years

## 2023-09-20 NOTE — PROGRESS NOTES
HISTORY OF PRESENT ILLNESS  Bailey Hernandez is a 47 y.o. female     HPI NEW patient consult referred by  Dr. Keron Malone for LEFT breast skin lesion. In Lymphedema clinic noticed skin tag. Biopsy done by dermatologist.  2nd biopsy recommended. Breast History:    LEFT breast cancer  7/21/2016-triple negative. Had 6 months of Chemo. .last treatment   1/15/2017    Had 35 sessions of Radiation last day was 4/12/2017    She see's Dr. Vaughn Gill      Family History:      Mammogram, 1/9/23, BIRADS 2  Mammogram Result (most recent):  [facility] MARY BETH DIGITAL SCREEN BILATERAL 01/09/2023    Narrative  This is a summary report. The complete report is available in the patient's medical record. If you cannot access the medical record, please contact the sending organization for a detailed fax or copy. STUDY: Bilateral digital screening mammogram with 3-D tomosynthesis    INDICATION:  Screening. COMPARISON: Prior studies dating back to 2016    BREAST COMPOSITION: There are scattered areas of fibroglandular density. FINDINGS: Bilateral digital screening mammography was performed and is  interpreted in conjunction with a computer assisted detection (CAD) system. Additionally, tomosynthesis of both breasts in the CC and MLO projections was  performed. There are chronic lumpectomy changes in the left axillary tail. No  suspicious masses or calcifications are identified. There has been no  significant change. Impression  BI-RADS 2: Benign. No mammographic evidence of malignancy. RECOMMENDATIONS:  Next screening mammogram is recommended in one year. The patient will be notified of these results.           Review of Systems      Physical Exam       ASSESSMENT and PLAN  {Assessment and Plan Chronic Disease:[id number]}

## 2023-10-02 ENCOUNTER — PREP FOR PROCEDURE (OUTPATIENT)
Age: 53
End: 2023-10-02

## 2023-10-02 DIAGNOSIS — Z85.3 PERSONAL HISTORY OF BREAST CANCER: Primary | ICD-10-CM

## 2023-10-10 ENCOUNTER — HOSPITAL ENCOUNTER (OUTPATIENT)
Facility: HOSPITAL | Age: 53
Discharge: HOME | End: 2023-10-13
Payer: COMMERCIAL

## 2023-10-10 VITALS
WEIGHT: 293 LBS | HEIGHT: 70 IN | DIASTOLIC BLOOD PRESSURE: 91 MMHG | BODY MASS INDEX: 41.95 KG/M2 | SYSTOLIC BLOOD PRESSURE: 149 MMHG | TEMPERATURE: 98 F | HEART RATE: 80 BPM

## 2023-10-10 LAB
ALBUMIN SERPL-MCNC: 3.4 G/DL (ref 3.5–5)
ALBUMIN/GLOB SERPL: 0.9 (ref 1.1–2.2)
ALP SERPL-CCNC: 146 U/L (ref 45–117)
ALT SERPL-CCNC: 22 U/L (ref 12–78)
ANION GAP SERPL CALC-SCNC: 5 MMOL/L (ref 5–15)
AST SERPL-CCNC: 20 U/L (ref 15–37)
BASOPHILS # BLD: 0 K/UL (ref 0–0.1)
BASOPHILS NFR BLD: 0 % (ref 0–1)
BILIRUB SERPL-MCNC: 0.6 MG/DL (ref 0.2–1)
BUN SERPL-MCNC: 11 MG/DL (ref 6–20)
BUN/CREAT SERPL: 16 (ref 12–20)
CALCIUM SERPL-MCNC: 9.1 MG/DL (ref 8.5–10.1)
CHLORIDE SERPL-SCNC: 104 MMOL/L (ref 97–108)
CO2 SERPL-SCNC: 30 MMOL/L (ref 21–32)
CREAT SERPL-MCNC: 0.69 MG/DL (ref 0.55–1.02)
DIFFERENTIAL METHOD BLD: ABNORMAL
EOSINOPHIL # BLD: 0.1 K/UL (ref 0–0.4)
EOSINOPHIL NFR BLD: 2 % (ref 0–7)
ERYTHROCYTE [DISTWIDTH] IN BLOOD BY AUTOMATED COUNT: 15.7 % (ref 11.5–14.5)
GLOBULIN SER CALC-MCNC: 3.9 G/DL (ref 2–4)
GLUCOSE SERPL-MCNC: 127 MG/DL (ref 65–100)
HCT VFR BLD AUTO: 35 % (ref 35–47)
HGB BLD-MCNC: 11 G/DL (ref 11.5–16)
IMM GRANULOCYTES # BLD AUTO: 0 K/UL (ref 0–0.04)
IMM GRANULOCYTES NFR BLD AUTO: 1 % (ref 0–0.5)
LYMPHOCYTES # BLD: 2 K/UL (ref 0.8–3.5)
LYMPHOCYTES NFR BLD: 25 % (ref 12–49)
MCH RBC QN AUTO: 26.7 PG (ref 26–34)
MCHC RBC AUTO-ENTMCNC: 31.4 G/DL (ref 30–36.5)
MCV RBC AUTO: 85 FL (ref 80–99)
MONOCYTES # BLD: 0.5 K/UL (ref 0–1)
MONOCYTES NFR BLD: 7 % (ref 5–13)
NEUTS SEG # BLD: 5.2 K/UL (ref 1.8–8)
NEUTS SEG NFR BLD: 65 % (ref 32–75)
NRBC # BLD: 0 K/UL (ref 0–0.01)
NRBC BLD-RTO: 0 PER 100 WBC
PLATELET # BLD AUTO: 305 K/UL (ref 150–400)
PMV BLD AUTO: 10 FL (ref 8.9–12.9)
POTASSIUM SERPL-SCNC: 3.5 MMOL/L (ref 3.5–5.1)
PROT SERPL-MCNC: 7.3 G/DL (ref 6.4–8.2)
RBC # BLD AUTO: 4.12 M/UL (ref 3.8–5.2)
SODIUM SERPL-SCNC: 139 MMOL/L (ref 136–145)
WBC # BLD AUTO: 7.8 K/UL (ref 3.6–11)

## 2023-10-10 PROCEDURE — 80053 COMPREHEN METABOLIC PANEL: CPT

## 2023-10-10 PROCEDURE — 36415 COLL VENOUS BLD VENIPUNCTURE: CPT

## 2023-10-10 PROCEDURE — 93005 ELECTROCARDIOGRAM TRACING: CPT | Performed by: SURGERY

## 2023-10-10 PROCEDURE — 85025 COMPLETE CBC W/AUTO DIFF WBC: CPT

## 2023-10-10 RX ORDER — MELOXICAM 15 MG/1
15 TABLET ORAL DAILY
COMMUNITY

## 2023-10-10 RX ORDER — METHOCARBAMOL 750 MG/1
750 TABLET, FILM COATED ORAL 3 TIMES DAILY
COMMUNITY

## 2023-10-10 ASSESSMENT — PAIN DESCRIPTION - ORIENTATION: ORIENTATION: LOWER

## 2023-10-10 ASSESSMENT — PAIN SCALES - GENERAL: PAINLEVEL_OUTOF10: 6

## 2023-10-10 ASSESSMENT — PAIN DESCRIPTION - LOCATION: LOCATION: BACK

## 2023-10-10 ASSESSMENT — PAIN DESCRIPTION - DESCRIPTORS: DESCRIPTORS: ACHING;SHARP

## 2023-10-10 NOTE — PERIOP NOTE
[address] INSTRUCTIONS    Surgery Date:   10/17/23    Your surgeon's office or [facility] staff will call you between 4 PM- 8 PM the day before surgery with your arrival time. If your surgery is on a Monday, you will receive a call the preceding Friday. If your surgeon's office has given you, your arrival time then go by that time. Please report to [facility] Patient Access/Admitting on the 1st floor. Bring your insurance card, photo identification, and any copayment ( if applicable). If you are going home the same day of your surgery, you must have a responsible adult to drive you home. You need to have a responsible adult to stay with you the first 24 hours after surgery and you should not drive a car for 24 hours following your surgery. If you are being admitted to the hospital, please leave personal belongings/luggage in your car until you have an assigned hospital room number. Do NOT drink alcohol or smoke 24 hours before surgery. STOP smoking for 14 days prior as it helps with breathing and healing after surgery. Please wear comfortable clothes. Wear your glasses instead of contacts. We ask that all money, jewelry and valuables be left at home. Wear no make up, particularly mascara, the day of surgery. All body piercings, rings, and jewelry need to be removed and left at home. Remove all nail polish except for clear. Please wear your hair loose or down, no pony-tails, buns, or any metal hair accessories. You may wear deodorant, unless having breast surgery. Do not shave any body area within 24 hours of your surgery. Please follow all instructions to avoid any potential surgical cancellation. Should your physical condition change, (i.e. fever, cold, flu, etc.) please notify your surgeon as soon as possible. It is important to be on time. If a situation occurs where you may be delayed, please call:  [phone] / [phone] on the day of surgery.   The Preadmission

## 2023-10-11 LAB
ALBUMIN SERPL-MCNC: 4.3 G/DL (ref 3.8–4.9)
ALBUMIN/GLOB SERPL: 1.5 {RATIO} (ref 1.2–2.2)
ALP SERPL-CCNC: 153 IU/L (ref 44–121)
ALT SERPL-CCNC: 16 IU/L (ref 0–32)
AST SERPL-CCNC: 23 IU/L (ref 0–40)
BASOPHILS # BLD AUTO: 0 X10E3/UL (ref 0–0.2)
BASOPHILS NFR BLD AUTO: 0 %
BILIRUB SERPL-MCNC: 0.5 MG/DL (ref 0–1.2)
BUN SERPL-MCNC: 10 MG/DL (ref 6–24)
BUN/CREAT SERPL: 14 (ref 9–23)
CALCIUM SERPL-MCNC: 9.5 MG/DL (ref 8.7–10.2)
CHLORIDE SERPL-SCNC: 100 MMOL/L (ref 96–106)
CO2 SERPL-SCNC: 25 MMOL/L (ref 20–29)
CREAT SERPL-MCNC: 0.73 MG/DL (ref 0.57–1)
EGFRCR SERPLBLD CKD-EPI 2021: 99 ML/MIN/1.73
EKG ATRIAL RATE: 74 BPM
EKG DIAGNOSIS: NORMAL
EKG P AXIS: 38 DEGREES
EKG P-R INTERVAL: 174 MS
EKG Q-T INTERVAL: 390 MS
EKG QRS DURATION: 82 MS
EKG QTC CALCULATION (BAZETT): 432 MS
EKG R AXIS: -24 DEGREES
EKG T AXIS: 32 DEGREES
EKG VENTRICULAR RATE: 74 BPM
EOSINOPHIL # BLD AUTO: 0.1 X10E3/UL (ref 0–0.4)
EOSINOPHIL NFR BLD AUTO: 1 %
ERYTHROCYTE [DISTWIDTH] IN BLOOD BY AUTOMATED COUNT: 15.2 % (ref 11.7–15.4)
GLOBULIN SER CALC-MCNC: 2.9 G/DL (ref 1.5–4.5)
GLUCOSE SERPL-MCNC: 105 MG/DL (ref 70–99)
HCT VFR BLD AUTO: 36.6 % (ref 34–46.6)
HGB BLD-MCNC: 11.7 G/DL (ref 11.1–15.9)
IMM GRANULOCYTES # BLD AUTO: 0 X10E3/UL (ref 0–0.1)
IMM GRANULOCYTES NFR BLD AUTO: 1 %
LYMPHOCYTES # BLD AUTO: 2 X10E3/UL (ref 0.7–3.1)
LYMPHOCYTES NFR BLD AUTO: 24 %
MCH RBC QN AUTO: 27 PG (ref 26.6–33)
MCHC RBC AUTO-ENTMCNC: 32 G/DL (ref 31.5–35.7)
MCV RBC AUTO: 84 FL (ref 79–97)
MONOCYTES # BLD AUTO: 0.5 X10E3/UL (ref 0.1–0.9)
MONOCYTES NFR BLD AUTO: 5 %
NEUTROPHILS # BLD AUTO: 5.8 X10E3/UL (ref 1.4–7)
NEUTROPHILS NFR BLD AUTO: 69 %
PLATELET # BLD AUTO: 332 X10E3/UL (ref 150–450)
POTASSIUM SERPL-SCNC: 3.9 MMOL/L (ref 3.5–5.2)
PROT SERPL-MCNC: 7.2 G/DL (ref 6–8.5)
RBC # BLD AUTO: 4.34 X10E6/UL (ref 3.77–5.28)
SODIUM SERPL-SCNC: 140 MMOL/L (ref 134–144)
WBC # BLD AUTO: 8.5 X10E3/UL (ref 3.4–10.8)

## 2023-10-11 PROCEDURE — 93010 ELECTROCARDIOGRAM REPORT: CPT | Performed by: INTERNAL MEDICINE

## 2023-10-11 SDOH — ECONOMIC STABILITY: INCOME INSECURITY: HOW HARD IS IT FOR YOU TO PAY FOR THE VERY BASICS LIKE FOOD, HOUSING, MEDICAL CARE, AND HEATING?: NOT VERY HARD

## 2023-10-11 SDOH — ECONOMIC STABILITY: HOUSING INSECURITY
IN THE LAST 12 MONTHS, WAS THERE A TIME WHEN YOU DID NOT HAVE A STEADY PLACE TO SLEEP OR SLEPT IN A SHELTER (INCLUDING NOW)?: NO

## 2023-10-11 SDOH — ECONOMIC STABILITY: TRANSPORTATION INSECURITY
IN THE PAST 12 MONTHS, HAS LACK OF TRANSPORTATION KEPT YOU FROM MEETINGS, WORK, OR FROM GETTING THINGS NEEDED FOR DAILY LIVING?: NO

## 2023-10-11 SDOH — ECONOMIC STABILITY: FOOD INSECURITY: WITHIN THE PAST 12 MONTHS, YOU WORRIED THAT YOUR FOOD WOULD RUN OUT BEFORE YOU GOT MONEY TO BUY MORE.: NEVER TRUE

## 2023-10-11 SDOH — ECONOMIC STABILITY: FOOD INSECURITY: WITHIN THE PAST 12 MONTHS, THE FOOD YOU BOUGHT JUST DIDN'T LAST AND YOU DIDN'T HAVE MONEY TO GET MORE.: NEVER TRUE

## 2023-10-13 ENCOUNTER — OFFICE VISIT (OUTPATIENT)
Age: 53
End: 2023-10-13
Payer: COMMERCIAL

## 2023-10-13 VITALS
WEIGHT: 293 LBS | HEIGHT: 70 IN | SYSTOLIC BLOOD PRESSURE: 133 MMHG | DIASTOLIC BLOOD PRESSURE: 78 MMHG | HEART RATE: 95 BPM | OXYGEN SATURATION: 98 % | TEMPERATURE: 97 F | BODY MASS INDEX: 41.95 KG/M2 | RESPIRATION RATE: 20 BRPM

## 2023-10-13 DIAGNOSIS — E66.01 MORBID OBESITY (HCC): ICD-10-CM

## 2023-10-13 DIAGNOSIS — Z00.00 ROUTINE GENERAL MEDICAL EXAMINATION AT A HEALTH CARE FACILITY: ICD-10-CM

## 2023-10-13 DIAGNOSIS — C50.912 MALIGNANT NEOPLASM OF LEFT BREAST IN FEMALE, ESTROGEN RECEPTOR NEGATIVE, UNSPECIFIED SITE OF BREAST (HCC): ICD-10-CM

## 2023-10-13 DIAGNOSIS — K58.1 IRRITABLE BOWEL SYNDROME WITH CONSTIPATION: ICD-10-CM

## 2023-10-13 DIAGNOSIS — Z00.00 ROUTINE GENERAL MEDICAL EXAMINATION AT A HEALTH CARE FACILITY: Primary | ICD-10-CM

## 2023-10-13 DIAGNOSIS — Z23 NEEDS FLU SHOT: ICD-10-CM

## 2023-10-13 DIAGNOSIS — M48.061 SPINAL STENOSIS, LUMBAR REGION WITHOUT NEUROGENIC CLAUDICATION: ICD-10-CM

## 2023-10-13 DIAGNOSIS — M79.7 FIBROMYALGIA: ICD-10-CM

## 2023-10-13 DIAGNOSIS — Z17.1 MALIGNANT NEOPLASM OF LEFT BREAST IN FEMALE, ESTROGEN RECEPTOR NEGATIVE, UNSPECIFIED SITE OF BREAST (HCC): ICD-10-CM

## 2023-10-13 DIAGNOSIS — N95.0 POSTMENOPAUSAL BLEEDING: ICD-10-CM

## 2023-10-13 DIAGNOSIS — I10 ESSENTIAL (PRIMARY) HYPERTENSION: ICD-10-CM

## 2023-10-13 PROCEDURE — 3074F SYST BP LT 130 MM HG: CPT | Performed by: INTERNAL MEDICINE

## 2023-10-13 PROCEDURE — 90471 IMMUNIZATION ADMIN: CPT | Performed by: INTERNAL MEDICINE

## 2023-10-13 PROCEDURE — 99396 PREV VISIT EST AGE 40-64: CPT | Performed by: INTERNAL MEDICINE

## 2023-10-13 PROCEDURE — 3078F DIAST BP <80 MM HG: CPT | Performed by: INTERNAL MEDICINE

## 2023-10-13 PROCEDURE — 90674 CCIIV4 VAC NO PRSV 0.5 ML IM: CPT | Performed by: INTERNAL MEDICINE

## 2023-10-13 ASSESSMENT — PATIENT HEALTH QUESTIONNAIRE - PHQ9
1. LITTLE INTEREST OR PLEASURE IN DOING THINGS: 0
SUM OF ALL RESPONSES TO PHQ QUESTIONS 1-9: 0
SUM OF ALL RESPONSES TO PHQ QUESTIONS 1-9: 0
2. FEELING DOWN, DEPRESSED OR HOPELESS: 0
SUM OF ALL RESPONSES TO PHQ QUESTIONS 1-9: 0
SUM OF ALL RESPONSES TO PHQ9 QUESTIONS 1 & 2: 0
SUM OF ALL RESPONSES TO PHQ QUESTIONS 1-9: 0

## 2023-10-13 NOTE — PATIENT INSTRUCTIONS
Accepting new patients:    [facility] Internal Medicine:  MD Lilia Galvan, NP  Martha Hoskins, MARINO LAGUNAS [facility] Internal Medicine [phone]  Daryle April, MD Anette Rosenthal, DO Dafne Leal, MD Zara Vazquez MD Marnie Jacob, MD Marcos Fare, MD Nani Sesay MD

## 2023-10-20 ENCOUNTER — HOSPITAL ENCOUNTER (OUTPATIENT)
Facility: HOSPITAL | Age: 53
Discharge: HOME | End: 2023-10-20
Attending: INTERNAL MEDICINE
Payer: COMMERCIAL

## 2023-10-20 ENCOUNTER — HOSPITAL ENCOUNTER (OUTPATIENT)
Facility: HOSPITAL | Age: 53
End: 2023-10-20
Attending: INTERNAL MEDICINE
Payer: COMMERCIAL

## 2023-10-20 DIAGNOSIS — Z98.84 HX OF LAPAROSCOPIC GASTRIC BANDING: ICD-10-CM

## 2023-10-20 DIAGNOSIS — G89.29 CHRONIC BILATERAL BACK PAIN, UNSPECIFIED BACK LOCATION: ICD-10-CM

## 2023-10-20 DIAGNOSIS — I10 PRIMARY HYPERTENSION: ICD-10-CM

## 2023-10-20 DIAGNOSIS — M54.9 CHRONIC BILATERAL BACK PAIN, UNSPECIFIED BACK LOCATION: ICD-10-CM

## 2023-10-20 DIAGNOSIS — E66.01 MORBID OBESITY (HCC): ICD-10-CM

## 2023-10-20 DIAGNOSIS — M79.7 FIBROMYALGIA: ICD-10-CM

## 2023-10-20 DIAGNOSIS — C50.919 TRIPLE NEGATIVE BREAST CANCER (HCC): ICD-10-CM

## 2023-10-20 DIAGNOSIS — K57.90 DIVERTICULOSIS OF INTESTINE, PART UNSPECIFIED, WITHOUT PERFORATION OR ABSCESS WITHOUT BLEEDING: ICD-10-CM

## 2023-10-20 DIAGNOSIS — K58.9 IRRITABLE BOWEL SYNDROME, UNSPECIFIED TYPE: ICD-10-CM

## 2023-10-20 PROCEDURE — 78306 BONE IMAGING WHOLE BODY: CPT | Performed by: INTERNAL MEDICINE

## 2023-10-20 PROCEDURE — 3430000000 HC RX DIAGNOSTIC RADIOPHARMACEUTICAL: Performed by: INTERNAL MEDICINE

## 2023-10-20 PROCEDURE — A9503 TC99M MEDRONATE: HCPCS | Performed by: INTERNAL MEDICINE

## 2023-10-20 PROCEDURE — 74177 CT ABD & PELVIS W/CONTRAST: CPT

## 2023-10-20 PROCEDURE — 6360000004 HC RX CONTRAST MEDICATION: Performed by: INTERNAL MEDICINE

## 2023-10-20 RX ORDER — TC 99M MEDRONATE 20 MG/10ML
25.5 INJECTION, POWDER, LYOPHILIZED, FOR SOLUTION INTRAVENOUS
Status: COMPLETED | OUTPATIENT
Start: 2023-10-20 | End: 2023-10-20

## 2023-10-20 RX ADMIN — TC 99M MEDRONATE 25.5 MILLICURIE: 20 INJECTION, POWDER, LYOPHILIZED, FOR SOLUTION INTRAVENOUS at 10:45

## 2023-10-20 RX ADMIN — IOPAMIDOL 100 ML: 755 INJECTION, SOLUTION INTRAVENOUS at 11:05

## 2023-10-25 ENCOUNTER — HOSPITAL ENCOUNTER (EMERGENCY)
Facility: HOSPITAL | Age: 53
Discharge: HOME | End: 2023-10-25
Attending: EMERGENCY MEDICINE
Payer: COMMERCIAL

## 2023-10-25 ENCOUNTER — TELEPHONE (OUTPATIENT)
Age: 53
End: 2023-10-25

## 2023-10-25 VITALS
DIASTOLIC BLOOD PRESSURE: 92 MMHG | BODY MASS INDEX: 41.95 KG/M2 | WEIGHT: 293 LBS | TEMPERATURE: 99.2 F | HEIGHT: 70 IN | HEART RATE: 86 BPM | SYSTOLIC BLOOD PRESSURE: 168 MMHG | RESPIRATION RATE: 16 BRPM | OXYGEN SATURATION: 100 %

## 2023-10-25 DIAGNOSIS — G43.001 MIGRAINE WITHOUT AURA AND WITH STATUS MIGRAINOSUS, NOT INTRACTABLE: Primary | ICD-10-CM

## 2023-10-25 LAB
COMMENT:: NORMAL
SPECIMEN HOLD: NORMAL

## 2023-10-25 PROCEDURE — 2580000003 HC RX 258: Performed by: STUDENT IN AN ORGANIZED HEALTH CARE EDUCATION/TRAINING PROGRAM

## 2023-10-25 PROCEDURE — 99284 EMERGENCY DEPT VISIT MOD MDM: CPT

## 2023-10-25 PROCEDURE — 6370000000 HC RX 637 (ALT 250 FOR IP): Performed by: STUDENT IN AN ORGANIZED HEALTH CARE EDUCATION/TRAINING PROGRAM

## 2023-10-25 PROCEDURE — 96374 THER/PROPH/DIAG INJ IV PUSH: CPT

## 2023-10-25 PROCEDURE — 96361 HYDRATE IV INFUSION ADD-ON: CPT

## 2023-10-25 PROCEDURE — 36415 COLL VENOUS BLD VENIPUNCTURE: CPT

## 2023-10-25 PROCEDURE — 96375 TX/PRO/DX INJ NEW DRUG ADDON: CPT

## 2023-10-25 PROCEDURE — 6360000002 HC RX W HCPCS: Performed by: STUDENT IN AN ORGANIZED HEALTH CARE EDUCATION/TRAINING PROGRAM

## 2023-10-25 RX ORDER — ONDANSETRON 2 MG/ML
4 INJECTION INTRAMUSCULAR; INTRAVENOUS ONCE
Status: COMPLETED | OUTPATIENT
Start: 2023-10-25 | End: 2023-10-25

## 2023-10-25 RX ORDER — DEXAMETHASONE SODIUM PHOSPHATE 10 MG/ML
10 INJECTION, SOLUTION INTRAMUSCULAR; INTRAVENOUS
Status: COMPLETED | OUTPATIENT
Start: 2023-10-25 | End: 2023-10-25

## 2023-10-25 RX ORDER — 0.9 % SODIUM CHLORIDE 0.9 %
1000 INTRAVENOUS SOLUTION INTRAVENOUS ONCE
Status: COMPLETED | OUTPATIENT
Start: 2023-10-25 | End: 2023-10-25

## 2023-10-25 RX ORDER — ACETAMINOPHEN 500 MG
1000 TABLET ORAL
Status: COMPLETED | OUTPATIENT
Start: 2023-10-25 | End: 2023-10-25

## 2023-10-25 RX ORDER — KETOROLAC TROMETHAMINE 30 MG/ML
30 INJECTION, SOLUTION INTRAMUSCULAR; INTRAVENOUS ONCE
Status: COMPLETED | OUTPATIENT
Start: 2023-10-25 | End: 2023-10-25

## 2023-10-25 RX ADMIN — ACETAMINOPHEN 1000 MG: 500 TABLET ORAL at 12:25

## 2023-10-25 RX ADMIN — KETOROLAC TROMETHAMINE 30 MG: 30 INJECTION, SOLUTION INTRAMUSCULAR; INTRAVENOUS at 12:29

## 2023-10-25 RX ADMIN — SODIUM CHLORIDE 1000 ML: 9 INJECTION, SOLUTION INTRAVENOUS at 12:25

## 2023-10-25 RX ADMIN — ONDANSETRON 4 MG: 2 INJECTION INTRAMUSCULAR; INTRAVENOUS at 12:27

## 2023-10-25 RX ADMIN — DEXAMETHASONE SODIUM PHOSPHATE 10 MG: 10 INJECTION, SOLUTION INTRAMUSCULAR; INTRAVENOUS at 12:30

## 2023-10-25 ASSESSMENT — PAIN DESCRIPTION - LOCATION: LOCATION: HEAD

## 2023-10-25 ASSESSMENT — ENCOUNTER SYMPTOMS
ABDOMINAL PAIN: 0
DIARRHEA: 0
SORE THROAT: 0
NAUSEA: 1
VOMITING: 1
SHORTNESS OF BREATH: 0
COUGH: 0
EYE PAIN: 0

## 2023-10-25 ASSESSMENT — PAIN SCALES - GENERAL: PAINLEVEL_OUTOF10: 8

## 2023-10-25 ASSESSMENT — PAIN - FUNCTIONAL ASSESSMENT: PAIN_FUNCTIONAL_ASSESSMENT: 0-10

## 2023-10-25 ASSESSMENT — PAIN DESCRIPTION - PAIN TYPE: TYPE: ACUTE PAIN

## 2023-10-25 ASSESSMENT — PAIN DESCRIPTION - FREQUENCY: FREQUENCY: CONTINUOUS

## 2023-10-25 NOTE — TELEPHONE ENCOUNTER
Lane Huffman from [facility] called asking for us to fax patient's op note and pathology report to [phone]. I called them back at [phone] option 2 and advised that the patient has not had surgery yet, surgery is on 10/31.

## 2023-10-25 NOTE — ED PROVIDER NOTES
[facility] EMERGENCY DEPT  EMERGENCY DEPARTMENT ENCOUNTER      Pt Name: Eunice Dill  MRN: [id number]  [address] 1970  Date of evaluation: 10/25/2023  Provider: Juan Antonio Jacobo       Chief Complaint   Patient presents with    Headache         HISTORY OF PRESENT ILLNESS   (Location/Symptom, Timing/Onset, Context/Setting, Quality, Duration, Modifying Factors, Severity)  Note limiting factors. 52-year-old female presents to ED with 2 days of headache. Patient reports that she has a history of migraines for which her rheumatologist has told her is likely related to her fibromyalgia. She reports for the past couple days she has had a severe headache associated with nausea and photophobia. She also endorses 2 episodes of emesis. She reports that she has a suspicion that this is connected to her sinuses as she has been congested and has been using Afrin and Flonase as well as Allegra with little relief. She also tried Excedrin with no relief. Otherwise denies any vision changes, numbness, tingling, fevers, chills. Review of External Medical Records:     Nursing Notes were reviewed. REVIEW OF SYSTEMS    (2-9 systems for level 4, 10 or more for level 5)     Review of Systems   Constitutional:  Negative for chills and fever. HENT:  Negative for congestion, ear pain and sore throat. Eyes:  Negative for pain. Respiratory:  Negative for cough and shortness of breath. Cardiovascular:  Negative for chest pain. Gastrointestinal:  Positive for nausea and vomiting. Negative for abdominal pain and diarrhea. Genitourinary:  Negative for dysuria and flank pain. Musculoskeletal:  Negative for myalgias. Skin:  Negative for rash. Neurological:  Positive for headaches. Negative for dizziness. Hematological:  Negative for adenopathy. Except as noted above the remainder of the review of systems was reviewed and negative.        PAST MEDICAL HISTORY     Past Medical

## 2023-10-25 NOTE — ED TRIAGE NOTES
Patient reports hx of migraines, does not see a neurologist, has fibromyalgia and is told by her ruematologist that the migraines could be caused by her fibromyalgia. Patient took Excedrin migraine with out relief. States she thinks that her headache x 2 days is from her sinuses. States she has been vomiting from the pain since yesterday. States applying pressure to her eye balls helps.

## 2023-10-25 NOTE — ED NOTES
Pt given discharge instructions, patient education, 0 prescriptions and follow up information. Pt verbalizes understanding. All questions answered. Pt discharged to home in private vehicle, ambulatory. Pt A&Ox4, RA, pain controlled.        Cindy Hernandez RN  10/25/23 6230

## 2023-10-26 DIAGNOSIS — Z85.3 PERSONAL HISTORY OF MALIGNANT NEOPLASM OF BREAST: ICD-10-CM

## 2023-10-26 DIAGNOSIS — I89.0 LYMPHEDEMA OF LEFT ARM: Primary | ICD-10-CM

## 2023-10-27 ENCOUNTER — TELEPHONE (OUTPATIENT)
Age: 53
End: 2023-10-27

## 2023-10-27 DIAGNOSIS — G43.909 MIGRAINE WITHOUT STATUS MIGRAINOSUS, NOT INTRACTABLE, UNSPECIFIED MIGRAINE TYPE: Primary | ICD-10-CM

## 2023-10-27 NOTE — TELEPHONE ENCOUNTER
----- Message from Bailey Hernandez sent at 10/26/2023  2:08 PM EDT -----  Regarding: Neurologist   Contact: [phone]  Please give me a recommendation for a neurologist.  I went to the ER yesterday for migraine and they told me to reach to my PCP for a recommendation.      Thanks,  [employer]

## 2023-10-31 ENCOUNTER — ANESTHESIA EVENT (OUTPATIENT)
Facility: HOSPITAL | Age: 53
End: 2023-10-31
Payer: COMMERCIAL

## 2023-10-31 ENCOUNTER — HOSPITAL ENCOUNTER (OUTPATIENT)
Facility: HOSPITAL | Age: 53
Setting detail: OUTPATIENT SURGERY
Discharge: HOME | End: 2023-10-31
Attending: SURGERY | Admitting: SURGERY
Payer: COMMERCIAL

## 2023-10-31 ENCOUNTER — ANESTHESIA (OUTPATIENT)
Facility: HOSPITAL | Age: 53
End: 2023-10-31
Payer: COMMERCIAL

## 2023-10-31 VITALS
TEMPERATURE: 98.1 F | OXYGEN SATURATION: 100 % | SYSTOLIC BLOOD PRESSURE: 138 MMHG | DIASTOLIC BLOOD PRESSURE: 91 MMHG | HEART RATE: 81 BPM | RESPIRATION RATE: 19 BRPM

## 2023-10-31 DIAGNOSIS — Z85.3 PERSONAL HISTORY OF BREAST CANCER: ICD-10-CM

## 2023-10-31 PROCEDURE — 2709999900 HC NON-CHARGEABLE SUPPLY: Performed by: SURGERY

## 2023-10-31 PROCEDURE — 7100000000 HC PACU RECOVERY - FIRST 15 MIN: Performed by: SURGERY

## 2023-10-31 PROCEDURE — 2500000003 HC RX 250 WO HCPCS: Performed by: SURGERY

## 2023-10-31 PROCEDURE — 3700000000 HC ANESTHESIA ATTENDED CARE: Performed by: SURGERY

## 2023-10-31 PROCEDURE — 99213 OFFICE O/P EST LOW 20 MIN: CPT | Performed by: SURGERY

## 2023-10-31 PROCEDURE — 3600000013 HC SURGERY LEVEL 3 ADDTL 15MIN: Performed by: SURGERY

## 2023-10-31 PROCEDURE — 6360000002 HC RX W HCPCS: Performed by: SURGERY

## 2023-10-31 PROCEDURE — 2500000003 HC RX 250 WO HCPCS

## 2023-10-31 PROCEDURE — 2580000003 HC RX 258

## 2023-10-31 PROCEDURE — 6370000000 HC RX 637 (ALT 250 FOR IP): Performed by: ANESTHESIOLOGY

## 2023-10-31 PROCEDURE — 6360000002 HC RX W HCPCS: Performed by: ANESTHESIOLOGY

## 2023-10-31 PROCEDURE — 6360000002 HC RX W HCPCS

## 2023-10-31 PROCEDURE — 7100000001 HC PACU RECOVERY - ADDTL 15 MIN: Performed by: SURGERY

## 2023-10-31 PROCEDURE — 3700000001 HC ADD 15 MINUTES (ANESTHESIA): Performed by: SURGERY

## 2023-10-31 PROCEDURE — 3600000003 HC SURGERY LEVEL 3 BASE: Performed by: SURGERY

## 2023-10-31 PROCEDURE — 88307 TISSUE EXAM BY PATHOLOGIST: CPT

## 2023-10-31 RX ORDER — LIDOCAINE HYDROCHLORIDE 20 MG/ML
INJECTION, SOLUTION EPIDURAL; INFILTRATION; INTRACAUDAL; PERINEURAL PRN
Status: DISCONTINUED | OUTPATIENT
Start: 2023-10-31 | End: 2023-10-31 | Stop reason: SDUPTHER

## 2023-10-31 RX ORDER — HYDRALAZINE HYDROCHLORIDE 20 MG/ML
10 INJECTION INTRAMUSCULAR; INTRAVENOUS
Status: DISCONTINUED | OUTPATIENT
Start: 2023-10-31 | End: 2023-10-31 | Stop reason: HOSPADM

## 2023-10-31 RX ORDER — DEXAMETHASONE SODIUM PHOSPHATE 4 MG/ML
INJECTION, SOLUTION INTRA-ARTICULAR; INTRALESIONAL; INTRAMUSCULAR; INTRAVENOUS; SOFT TISSUE PRN
Status: DISCONTINUED | OUTPATIENT
Start: 2023-10-31 | End: 2023-10-31 | Stop reason: SDUPTHER

## 2023-10-31 RX ORDER — TRAMADOL HYDROCHLORIDE 50 MG/1
50 TABLET ORAL EVERY 6 HOURS PRN
Qty: 12 TABLET | Refills: 0 | Status: SHIPPED | OUTPATIENT
Start: 2023-10-31 | End: 2023-11-03

## 2023-10-31 RX ORDER — SODIUM CHLORIDE 9 MG/ML
INJECTION, SOLUTION INTRAVENOUS PRN
Status: DISCONTINUED | OUTPATIENT
Start: 2023-10-31 | End: 2023-10-31 | Stop reason: HOSPADM

## 2023-10-31 RX ORDER — MIDAZOLAM HYDROCHLORIDE 1 MG/ML
INJECTION INTRAMUSCULAR; INTRAVENOUS PRN
Status: DISCONTINUED | OUTPATIENT
Start: 2023-10-31 | End: 2023-10-31 | Stop reason: SDUPTHER

## 2023-10-31 RX ORDER — HYDROMORPHONE HYDROCHLORIDE 1 MG/ML
0.5 INJECTION, SOLUTION INTRAMUSCULAR; INTRAVENOUS; SUBCUTANEOUS EVERY 5 MIN PRN
Status: DISCONTINUED | OUTPATIENT
Start: 2023-10-31 | End: 2023-10-31 | Stop reason: HOSPADM

## 2023-10-31 RX ORDER — LIDOCAINE HYDROCHLORIDE AND EPINEPHRINE 10; 10 MG/ML; UG/ML
30 INJECTION, SOLUTION INFILTRATION; PERINEURAL ONCE
Status: CANCELLED | OUTPATIENT
Start: 2023-10-31 | End: 2023-10-31

## 2023-10-31 RX ORDER — SODIUM CHLORIDE 0.9 % (FLUSH) 0.9 %
5-40 SYRINGE (ML) INJECTION PRN
Status: DISCONTINUED | OUTPATIENT
Start: 2023-10-31 | End: 2023-10-31 | Stop reason: HOSPADM

## 2023-10-31 RX ORDER — DIPHENHYDRAMINE HYDROCHLORIDE 50 MG/ML
12.5 INJECTION INTRAMUSCULAR; INTRAVENOUS
Status: DISCONTINUED | OUTPATIENT
Start: 2023-10-31 | End: 2023-10-31 | Stop reason: HOSPADM

## 2023-10-31 RX ORDER — ROCURONIUM BROMIDE 10 MG/ML
INJECTION, SOLUTION INTRAVENOUS PRN
Status: DISCONTINUED | OUTPATIENT
Start: 2023-10-31 | End: 2023-10-31 | Stop reason: SDUPTHER

## 2023-10-31 RX ORDER — SUCCINYLCHOLINE CHLORIDE 20 MG/ML
INJECTION INTRAMUSCULAR; INTRAVENOUS PRN
Status: DISCONTINUED | OUTPATIENT
Start: 2023-10-31 | End: 2023-10-31 | Stop reason: SDUPTHER

## 2023-10-31 RX ORDER — ONDANSETRON 2 MG/ML
4 INJECTION INTRAMUSCULAR; INTRAVENOUS
Status: DISCONTINUED | OUTPATIENT
Start: 2023-10-31 | End: 2023-10-31 | Stop reason: HOSPADM

## 2023-10-31 RX ORDER — SODIUM CHLORIDE 0.9 % (FLUSH) 0.9 %
5-40 SYRINGE (ML) INJECTION EVERY 12 HOURS SCHEDULED
Status: DISCONTINUED | OUTPATIENT
Start: 2023-10-31 | End: 2023-10-31 | Stop reason: HOSPADM

## 2023-10-31 RX ORDER — PROCHLORPERAZINE EDISYLATE 5 MG/ML
5 INJECTION INTRAMUSCULAR; INTRAVENOUS
Status: DISCONTINUED | OUTPATIENT
Start: 2023-10-31 | End: 2023-10-31 | Stop reason: HOSPADM

## 2023-10-31 RX ORDER — SODIUM CHLORIDE, SODIUM LACTATE, POTASSIUM CHLORIDE, CALCIUM CHLORIDE 600; 310; 30; 20 MG/100ML; MG/100ML; MG/100ML; MG/100ML
INJECTION, SOLUTION INTRAVENOUS CONTINUOUS PRN
Status: DISCONTINUED | OUTPATIENT
Start: 2023-10-31 | End: 2023-10-31 | Stop reason: SDUPTHER

## 2023-10-31 RX ORDER — SCOLOPAMINE TRANSDERMAL SYSTEM 1 MG/1
1 PATCH, EXTENDED RELEASE TRANSDERMAL
Status: DISCONTINUED | OUTPATIENT
Start: 2023-10-31 | End: 2023-10-31 | Stop reason: HOSPADM

## 2023-10-31 RX ORDER — FENTANYL CITRATE 50 UG/ML
INJECTION, SOLUTION INTRAMUSCULAR; INTRAVENOUS PRN
Status: DISCONTINUED | OUTPATIENT
Start: 2023-10-31 | End: 2023-10-31 | Stop reason: SDUPTHER

## 2023-10-31 RX ORDER — FENTANYL CITRATE 50 UG/ML
25 INJECTION, SOLUTION INTRAMUSCULAR; INTRAVENOUS EVERY 5 MIN PRN
Status: DISCONTINUED | OUTPATIENT
Start: 2023-10-31 | End: 2023-10-31 | Stop reason: HOSPADM

## 2023-10-31 RX ORDER — MIDAZOLAM HYDROCHLORIDE 2 MG/2ML
2 INJECTION, SOLUTION INTRAMUSCULAR; INTRAVENOUS
Status: DISCONTINUED | OUTPATIENT
Start: 2023-10-31 | End: 2023-10-31 | Stop reason: HOSPADM

## 2023-10-31 RX ORDER — ONDANSETRON 2 MG/ML
INJECTION INTRAMUSCULAR; INTRAVENOUS PRN
Status: DISCONTINUED | OUTPATIENT
Start: 2023-10-31 | End: 2023-10-31 | Stop reason: SDUPTHER

## 2023-10-31 RX ORDER — BUPIVACAINE HYDROCHLORIDE AND EPINEPHRINE 5; 5 MG/ML; UG/ML
30 INJECTION, SOLUTION PERINEURAL ONCE
Status: CANCELLED | OUTPATIENT
Start: 2023-10-31 | End: 2023-10-31

## 2023-10-31 RX ADMIN — SUCCINYLCHOLINE CHLORIDE 200 MG: 20 INJECTION, SOLUTION INTRAMUSCULAR; INTRAVENOUS at 11:42

## 2023-10-31 RX ADMIN — LIDOCAINE HYDROCHLORIDE 100 MG: 20 INJECTION, SOLUTION EPIDURAL; INFILTRATION; INTRACAUDAL; PERINEURAL at 11:42

## 2023-10-31 RX ADMIN — FENTANYL CITRATE 25 MCG: 50 INJECTION, SOLUTION INTRAMUSCULAR; INTRAVENOUS at 12:57

## 2023-10-31 RX ADMIN — ONDANSETRON HYDROCHLORIDE 4 MG: 2 INJECTION, SOLUTION INTRAMUSCULAR; INTRAVENOUS at 11:47

## 2023-10-31 RX ADMIN — SODIUM CHLORIDE, POTASSIUM CHLORIDE, SODIUM LACTATE AND CALCIUM CHLORIDE: 600; 310; 30; 20 INJECTION, SOLUTION INTRAVENOUS at 11:16

## 2023-10-31 RX ADMIN — ROCURONIUM BROMIDE 5 MG: 10 SOLUTION INTRAVENOUS at 11:42

## 2023-10-31 RX ADMIN — MIDAZOLAM 2 MG: 1 INJECTION INTRAMUSCULAR; INTRAVENOUS at 11:32

## 2023-10-31 RX ADMIN — FENTANYL CITRATE 50 MCG: 50 INJECTION, SOLUTION INTRAMUSCULAR; INTRAVENOUS at 11:48

## 2023-10-31 RX ADMIN — PROPOFOL 200 MG: 10 INJECTION, EMULSION INTRAVENOUS at 11:42

## 2023-10-31 RX ADMIN — PROPOFOL 100 MG: 10 INJECTION, EMULSION INTRAVENOUS at 12:03

## 2023-10-31 RX ADMIN — DEXAMETHASONE SODIUM PHOSPHATE 4 MG: 4 INJECTION, SOLUTION INTRAMUSCULAR; INTRAVENOUS at 11:47

## 2023-10-31 RX ADMIN — FENTANYL CITRATE 50 MCG: 50 INJECTION, SOLUTION INTRAMUSCULAR; INTRAVENOUS at 11:42

## 2023-10-31 ASSESSMENT — PAIN DESCRIPTION - ORIENTATION: ORIENTATION: LEFT

## 2023-10-31 ASSESSMENT — PAIN DESCRIPTION - DESCRIPTORS
DESCRIPTORS: ACHING;SORE
DESCRIPTORS: SORE;ACHING;BURNING

## 2023-10-31 ASSESSMENT — PAIN SCALES - GENERAL
PAINLEVEL_OUTOF10: 5
PAINLEVEL_OUTOF10: 4
PAINLEVEL_OUTOF10: 0

## 2023-10-31 ASSESSMENT — PAIN - FUNCTIONAL ASSESSMENT: PAIN_FUNCTIONAL_ASSESSMENT: 0-10

## 2023-10-31 ASSESSMENT — PAIN DESCRIPTION - LOCATION: LOCATION: BREAST

## 2023-10-31 NOTE — ANESTHESIA PRE PROCEDURE
Component Value Date/Time     10/10/2023 02:03 PM    K 3.9 10/10/2023 02:03 PM     10/10/2023 02:03 PM    CO2 25 10/10/2023 02:03 PM    BUN 10 10/10/2023 02:03 PM    CREATININE 0.73 10/10/2023 02:03 PM    GFRAA >60 02/03/2022 09:51 AM    AGRATIO 1.5 10/10/2023 02:03 PM    LABGLOM 99 10/10/2023 02:03 PM    LABGLOM >60 10/10/2023 11:23 AM    GLUCOSE 105 10/10/2023 02:03 PM    GLUCOSE 127 10/10/2023 11:23 AM    PROT 7.2 10/10/2023 02:03 PM    CALCIUM 9.5 10/10/2023 02:03 PM    BILITOT 0.5 10/10/2023 02:03 PM    ALKPHOS 153 10/10/2023 02:03 PM    ALKPHOS 146 10/10/2023 11:23 AM    AST 23 10/10/2023 02:03 PM    ALT 16 10/10/2023 02:03 PM       POC Tests: No results for input(s): \"POCGLU\", \"POCNA\", \"POCK\", \"POCCL\", \"POCBUN\", \"POCHEMO\", \"POCHCT\" in the last 72 hours. Coags: No results found for: \"PROTIME\", \"INR\", \"APTT\"    HCG (If Applicable): No results found for: \"PREGTESTUR\", \"PREGSERUM\", \"HCG\", \"HCGQUANT\"     ABGs: No results found for: \"PHART\", \"PO2ART\", \"TFS3BRL\", \"OZM0GHX\", \"BEART\", \"K9KWSDMR\"     Type & Screen (If Applicable):  No results found for: \"LABABO\", \"LABRH\"    Drug/Infectious Status (If Applicable):  No results found for: \"HIV\", \"HEPCAB\"    COVID-19 Screening (If Applicable):   Lab Results   Component Value Date/Time    COVID19 Not detected 02/07/2022 10:56 AM           Anesthesia Evaluation  Patient summary reviewed and Nursing notes reviewed   history of anesthetic complications: PONV. Airway: Mallampati: I          Dental: normal exam         Pulmonary: breath sounds clear to auscultation                             Cardiovascular:  Exercise tolerance: good (>4 METS),   (+) hypertension:,         Rhythm: regular  Rate: normal                    Neuro/Psych:               GI/Hepatic/Renal:             Endo/Other:                     Abdominal:             Vascular: negative vascular ROS.          Other Findings:           Anesthesia Plan      general     ASA 3       Induction:

## 2023-10-31 NOTE — DISCHARGE INSTRUCTIONS
Discharge Instructions from Dr. Christophe Jackson    I will call you with the pathology results, typically within 1 week from today. You may shower, but no hot tubs, swimming pools, or baths until your incision is healed. No heavy lifting with the affected extremity (nothing greater than 5 pounds), and limit its use for the next 4-5 days. You may use an ice pack for comfort for the next couple of days, but do not place ice directly on the skin. Rather, use a towel or clothing to serve as a barrier between skin and ice to prevent injury. If I placed a drain, follow the drain instructions provided, especially as you keep a record of the drain output. Follow medication instructions carefully. Watch for signs of infection as listed below. Redness  Swelling  Drainage from the incision or from your nipple that appears infected  Fever over 101 degrees for consecutive readings, or over 99.5 if you are currently undergoing chemotherapy. Call our office (number is below) for a follow-up appointment. If you have any problems, our phone number is [phone].

## 2023-10-31 NOTE — PERIOP NOTE
Reviewed discharge instructions with patient's mom, Dixon Carrion, via phone. Dixon Carrion verbalized understanding. Paper copy given to patient. No further questions at this time.

## 2023-11-01 NOTE — OP NOTE
[facility]  OPERATIVE REPORT    Name:  Zulma Sullivan  MR#:  [id number]  :  1970  ACCOUNT #:  [de-identified]  DATE OF SERVICE:  10/31/2023    PREOPERATIVE DIAGNOSIS:  Atypical vascular lesion, left breast.    POSTOPERATIVE DIAGNOSIS:  Atypical vascular lesion, left breast.    PROCEDURE PERFORMED:  Left breast biopsy. SURGEON:  Kristy Loomis MD    ASSISTANT:  Benny Theodore. ANESTHESIA:  General.    COMPLICATIONS:  None. SPECIMENS REMOVED:  Left breast tissue. IMPLANTS:  None. ESTIMATED BLOOD LOSS:  Minimal.    INDICATIONS:  The patient is a 42-year-old female, has a history of left breast cancer, on radiation therapy, who had an atypical vascular lesion in her scar requiring excision to rule out angiosarcoma. PROCEDURE:  After satisfactory induction of general LMA anesthesia, the patient was prepped and draped in sterile fashion. An elliptical incision was made around the distal portion of the scar and deepened through subcutaneous tissue with Bovie cautery. All the skin in the abnormal area was removed as was a firm area below the skin. Specimen was oriented and sent to Pathology. All dissection planes were hemostatic. The wound was anesthetized with 0.5% Marcaine and closed with an inner layer of 3-0 Vicryl and a running subcuticular 4-0 Monocryl on the skin. The patient tolerated the procedure well with no complications. She was taken to the recovery room in stable condition.       Kristy Loomis MD      ION/[id number]/[id number]  D:  10/31/2023 16:00  T:  10/31/2023 23:01  JOB #:  [id number]  CC:  Sharif Ortega MD

## 2023-11-02 ENCOUNTER — APPOINTMENT (OUTPATIENT)
Facility: HOSPITAL | Age: 53
End: 2023-11-02
Payer: COMMERCIAL

## 2023-11-06 ENCOUNTER — TELEPHONE (OUTPATIENT)
Age: 53
End: 2023-11-06

## 2023-11-10 DIAGNOSIS — C50.919 TRIPLE NEGATIVE BREAST CANCER (HCC): Primary | ICD-10-CM

## 2023-11-10 DIAGNOSIS — I89.0 LYMPHEDEMA: ICD-10-CM

## 2023-11-13 NOTE — PROGRESS NOTES
HISTORY OF PRESENT ILLNESS  Adelia Barron is a 47 y.o. female     HPI Established patient presents for a post-op visit. Breast history -   Referring - Dr. Andre Reeder breast cancer - 7/21/2016 - triple negative. Had 6 months of chemo - last treatment 1/15/2017  Had 35 sessions of radiation - last treatment 4/12/2017  LEFT breast skin lesion  10/31/23 - Left breast; biopsy - Dr. 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 No residual vascular lesion, no atypia and no evidence of malignancy identified. Review of Systems      Physical Exam  Chest:              ASSESSMENT and PLAN   Diagnosis Orders   1. History of breast cancer        2. Skin lesion            Incision CDI and healing well. Reviewed pathology. Cleared for all activity; encouraged a supportive bra. Can resume using lymphedema pump. Continue annual mammograms and care with Dr. Viry Canchola. RTC here PRN. She is comfortable with this plan. All questions answered and she stated understanding.

## 2023-11-14 ENCOUNTER — OFFICE VISIT (OUTPATIENT)
Age: 53
End: 2023-11-14

## 2023-11-14 VITALS — WEIGHT: 293 LBS | HEIGHT: 70 IN | BODY MASS INDEX: 41.95 KG/M2

## 2023-11-14 DIAGNOSIS — L98.9 SKIN LESION: ICD-10-CM

## 2023-11-14 DIAGNOSIS — Z85.3 HISTORY OF BREAST CANCER: Primary | ICD-10-CM

## 2023-11-14 PROCEDURE — 99024 POSTOP FOLLOW-UP VISIT: CPT | Performed by: NURSE PRACTITIONER

## 2023-11-28 ENCOUNTER — APPOINTMENT (OUTPATIENT)
Facility: HOSPITAL | Age: 53
End: 2023-11-28
Payer: COMMERCIAL

## 2024-01-02 ENCOUNTER — HOSPITAL ENCOUNTER (OUTPATIENT)
Facility: HOSPITAL | Age: 54
Setting detail: RECURRING SERIES
Discharge: HOME | End: 2024-01-05
Payer: COMMERCIAL

## 2024-01-02 VITALS — WEIGHT: 293 LBS | HEIGHT: 70 IN | BODY MASS INDEX: 41.95 KG/M2

## 2024-01-02 PROCEDURE — 97535 SELF CARE MNGMENT TRAINING: CPT

## 2024-01-02 PROCEDURE — 97760 ORTHOTIC MGMT&TRAING 1ST ENC: CPT

## 2024-01-02 PROCEDURE — 97162 PT EVAL MOD COMPLEX 30 MIN: CPT

## 2024-01-02 NOTE — THERAPY EVALUATION
[facility]  a part of [facility]   [address], [address]  [place] [address]  Phone: [phone]    Fax: [phone]                                                                                PT/OT LYMPHEDEMA - EVALUATION/PLAN OF CARE NOTE (updated 3/23)      Date: 2024          Patient Name:  Denis Pitts :  1970   Medical   Diagnosis:  Triple negative breast cancer (HCC) [C50.919]  Lymphedema [I89.0] Treatment Diagnosis:  I89.0     Lymphedema, not elsewhere classified    Referral Source:  Jane Lui DO Provider #:  [id number]                Insurance: Payor: [employer] / Plan: [employer] HMO / Product Type: *No Product type* /      Patient  verified yes     Visit #   Current  / Total 1 1   Time   In / Out 1050 1300   Total Treatment Time 130   Total Timed Codes 90         SUBJECTIVE  Pain Level (0-10 scale):   5 out of 10 numeric scale in left posterior medial upper arm into axilla and lateral trunk sharp pain and occasionally travels down the arm and down lateral trunk, at worst 10/10 numeric scale  [x]constant with varying intensity[]intermittent []improving []worsening []no change since onset    Any medication changes, allergies to medications, adverse drug reactions, diagnosis change, or new procedure performed?: [x] No    [] Yes (see summary sheet for update)  Medications: Verified on Patient Summary List    Subjective functional status/changes:     I started having more trouble with the cording and  it started acting up around September.  I don't really remember doing anything different at that time.  I stretch my arm throughout the day.  They removed the scar from my left breast and I had 2 biopsies, but they didn't find anything.   I love these stockings, they really help and make a difference.    Start of Care: 2024  Onset Date: 10/20/2021 patient returns for re-evaluation  Current symptoms/Complaints: left arm swelling, 
due to the diagnosis indicated above.  Lymphedema is a progressive and chronic condition.  It may cause acute infections, muscle atrophy, discomfort, and connective tissue fibrosis with irreversible tissue damage, decreased ROM in the affected limb and diminished functional mobility possibly interfering with independence and ability to work.  Recurrent infections and wound complications that commonly occur with Lymphedema often require hospitalization and extensive wound care, thus increasing medical costs.    The patient has received complete decongestive therapy which includes manual lymphatic drainage, lymphedema specific exercises, compression bandaging, and hygiene/skin care. Goals of therapy are to reduce the edema and prevent re-accumulation of fluid with its complications.  It is medically necessary for this patient to have daytime garments to control this condition. They will need 2 sets (one set to wear and one set to wash for adequate skin care and wearing time).  Garments must be replaced every 4-6 months for effectiveness.  There are no substitutes available that offer acceptable compression treatment for this Lymphedema patient.    If further information is requested, please contact our certified lymphedema therapists at [phone].    [x] Lianet Nguyen, PT, MSPT, CLT-JOSE          []  Myrna Donahue, PT, CLT           [] Luis M Bennett, PTA, CLT, CSIS    Printed  Provider Name Jane Lui, DO Provider Signature  Date    Provider NPI

## 2024-01-10 ENCOUNTER — HOSPITAL ENCOUNTER (OUTPATIENT)
Facility: HOSPITAL | Age: 54
Discharge: HOME | End: 2024-01-13
Attending: INTERNAL MEDICINE
Payer: COMMERCIAL

## 2024-01-10 DIAGNOSIS — C50.919 TRIPLE NEGATIVE BREAST CANCER (HCC): ICD-10-CM

## 2024-01-10 PROCEDURE — 77063 BREAST TOMOSYNTHESIS BI: CPT

## 2024-01-12 ENCOUNTER — HOSPITAL ENCOUNTER (OUTPATIENT)
Facility: HOSPITAL | Age: 54
Setting detail: RECURRING SERIES
Discharge: HOME | End: 2024-01-15
Payer: COMMERCIAL

## 2024-01-12 PROCEDURE — 97140 MANUAL THERAPY 1/> REGIONS: CPT

## 2024-01-13 NOTE — PROGRESS NOTES
PHYSICAL THERAPY/OCCUPATIONAL THERAPY - DAILY TREATMENT NOTE (updated 3/23)      Date: 2024          Patient Name:  Denis Pitts :  1970   Medical   Diagnosis:  Triple negative breast cancer (HCC) [C50.919]  Lymphedema [I89.0] Treatment Diagnosis:  I89.0     Lymphedema, not elsewhere classified    Referral Source:  Jane Lui DO Insurance:   Payor: [employer] / Plan: [employer] HMO / Product Type: *No Product type* /                     Patient  verified yes     Visit #   Current  / Total 2 ***   Time   In / Out 12:15 PM 1:45 PM   Total Treatment Time 90   Total Timed Codes 90         SUBJECTIVE    Pain Level (0-10 scale): ***    Any medication changes, allergies to medications, adverse drug reactions, diagnosis change, or new procedure performed?: [x] No    [] Yes (see summary sheet for update)  Medications: Verified on Patient Summary List    Subjective functional status/changes:     ***    OBJECTIVE      Therapeutic Procedures:  Tx Min Billable or 1:1 Min (if diff from Tx Min) Procedure, Rationale, Specifics     26146 Manual Therapy (timed):  decrease pain, increase ROM, decrease edema, and increase postural awareness to improve patient's ability to progress to PLOF and address remaining functional goals.  The manual therapy interventions were performed at a separate and distinct time from the therapeutic activities interventions . (see flow sheet as applicable)    Details if applicable:       {RVA Ther Procedures:58891}    Details if applicable:       {RVA Ther Procedures:84677}    Details if applicable:       {RVA Ther Procedures:57921}    Details if applicable:       {RVA Ther Procedures:99700}    Details if applicable:     *** ***    Total Total       Patient Education: [x] Review HEP    [x]  Patient Education billed concurrently with other procedures   [] MLD Patient Education {OP lymphedema MLD education:18580}  [] Progressed/Changed HEP based on:   [] positioning   []

## 2024-01-18 ENCOUNTER — HOSPITAL ENCOUNTER (OUTPATIENT)
Facility: HOSPITAL | Age: 54
Setting detail: RECURRING SERIES
Discharge: HOME | End: 2024-01-21
Payer: COMMERCIAL

## 2024-01-18 PROCEDURE — 97110 THERAPEUTIC EXERCISES: CPT

## 2024-01-18 PROCEDURE — 97763 ORTHC/PROSTC MGMT SBSQ ENC: CPT

## 2024-01-18 PROCEDURE — 97140 MANUAL THERAPY 1/> REGIONS: CPT

## 2024-01-25 ENCOUNTER — HOSPITAL ENCOUNTER (OUTPATIENT)
Facility: HOSPITAL | Age: 54
Setting detail: RECURRING SERIES
Discharge: HOME | End: 2024-01-28
Payer: COMMERCIAL

## 2024-01-25 PROCEDURE — 97763 ORTHC/PROSTC MGMT SBSQ ENC: CPT

## 2024-01-25 PROCEDURE — 97140 MANUAL THERAPY 1/> REGIONS: CPT

## 2024-01-27 ENCOUNTER — HOSPITAL ENCOUNTER (EMERGENCY)
Facility: HOSPITAL | Age: 54
Discharge: HOME | End: 2024-01-27
Attending: EMERGENCY MEDICINE
Payer: COMMERCIAL

## 2024-01-27 VITALS
OXYGEN SATURATION: 99 % | HEIGHT: 70 IN | SYSTOLIC BLOOD PRESSURE: 193 MMHG | RESPIRATION RATE: 16 BRPM | WEIGHT: 293 LBS | DIASTOLIC BLOOD PRESSURE: 109 MMHG | BODY MASS INDEX: 41.95 KG/M2 | HEART RATE: 87 BPM | TEMPERATURE: 99.1 F

## 2024-01-27 DIAGNOSIS — L29.9 ITCHING: Primary | ICD-10-CM

## 2024-01-27 PROCEDURE — 6370000000 HC RX 637 (ALT 250 FOR IP): Performed by: EMERGENCY MEDICINE

## 2024-01-27 PROCEDURE — 99283 EMERGENCY DEPT VISIT LOW MDM: CPT

## 2024-01-27 RX ORDER — HYDROXYZINE HYDROCHLORIDE 25 MG/1
50 TABLET, FILM COATED ORAL
Status: COMPLETED | OUTPATIENT
Start: 2024-01-27 | End: 2024-01-27

## 2024-01-27 RX ORDER — BENZOCAINE/MENTHOL 6 MG-10 MG
LOZENGE MUCOUS MEMBRANE 2 TIMES DAILY
Status: DISCONTINUED | OUTPATIENT
Start: 2024-01-27 | End: 2024-01-27 | Stop reason: HOSPADM

## 2024-01-27 RX ORDER — PREDNISONE 50 MG/1
50 TABLET ORAL DAILY
Qty: 5 TABLET | Refills: 0 | Status: SHIPPED | OUTPATIENT
Start: 2024-01-27 | End: 2024-02-01

## 2024-01-27 RX ORDER — TRIAMCINOLONE ACETONIDE 1 MG/G
CREAM TOPICAL 2 TIMES DAILY
Status: DISCONTINUED | OUTPATIENT
Start: 2024-01-27 | End: 2024-01-27 | Stop reason: HOSPADM

## 2024-01-27 RX ADMIN — TRIAMCINOLONE ACETONIDE: 1 CREAM TOPICAL at 00:45

## 2024-01-27 RX ADMIN — HYDROXYZINE HYDROCHLORIDE 50 MG: 25 TABLET, FILM COATED ORAL at 00:24

## 2024-01-27 NOTE — ED TRIAGE NOTES
Patient states she fell asleep on the sofa tonight and started itching to left hand, she woke up and left hand and fingers are swollen, red and itching which is progressing over the past 3 hours..

## 2024-01-27 NOTE — DISCHARGE INSTRUCTIONS
When you  your medicines from the pharmacy, please look at their antiitch area and try to find the Benadryl cream

## 2024-01-27 NOTE — ED NOTES
Pt given discharge instructions, pt education, 1 prescriptions and follow up information. Pt verbalizes understanding. All questions answered. MD aware of BP and cleared for discharge. Pt discharged to home in private vehicle, ambulatory. Pt A&O x4, RA, pain controlled.

## 2024-01-27 NOTE — ED PROVIDER NOTES
[facility]  EMERGENCY DEPARTMENT ENCOUNTER      Pt Name: Denis Pitts  MRN: [id number]  Birthdate 1970  Date of evaluation: 1/26/2024  Provider: Ryley Marin DO    CHIEF COMPLAINT       Chief Complaint   Patient presents with    SWOLLEN HAND    Hand Problem         HISTORY OF PRESENT ILLNESS   (Location/Symptom, Timing/Onset, Context/Setting, Quality, Duration, Modifying Factors, Severity)  Note limiting factors.   53-year-old female comes in with itching.  She states that she went to bed feeling okay when she woke up on the sofa her left hand was itching on the dorsum at the base of the third fourth and fifth finger.  She then put some rubbing alcohol and some hydrocortisone cream.  She comes in here with continued itching.  She has no itching elsewhere just in this area described.  She does not have any memory of being bit.  Being scratched in that area.  She denies other complaints            Review of External Medical Records:     Nursing Notes were reviewed.    REVIEW OF SYSTEMS    (2-9 systems for level 4, 10 or more for level 5)     Review of Systems    Except as noted above the remainder of the review of systems was reviewed and negative.       PAST MEDICAL HISTORY     Past Medical History:   Diagnosis Date    Anxiety     Arthritis     Breast cancer (HCC) 07/21/2016    Triple Negative    Cancer (HCC) 2016    LEFT BREAST    Chronic back pain     Fibromyalgia     Heart murmur     NO FOLLOW UP WITH CARDIOLOGY    History of therapeutic radiation 2016    Left Breast Feb-April 2017    Hx antineoplastic chemo     [id number] 2018    Hypertension     IBS (irritable bowel syndrome)     WITH CONSTIPATION    Lymphedema     LEFT    Obesity     PONV (postoperative nausea and vomiting)     Thalassanemia          SURGICAL HISTORY       Past Surgical History:   Procedure Laterality Date    BREAST SURGERY Left 07/21/2016    LUMPECTOMY Triple Negative    BREAST SURGERY Left 10/31/2023    LEFT BREAST  normal range or not returned as of this dictation.    EMERGENCY DEPARTMENT COURSE and DIFFERENTIAL DIAGNOSIS/MDM:   Vitals:    Vitals:    01/27/24 0008   BP: (!) 193/109   Pulse: 87   Resp: 16   Temp: 99.1 °F (37.3 °C)   TempSrc: Oral   SpO2: 99%   Weight: (!) 155.6 kg (343 lb)   Height: 1.778 m (5' 10\")           Medical Decision Making  Stable well-appearing patient.  Patient has several environmental allergies she woke up with this itching to her hands with some very small pinpoint areas that appear to be blisters.  She is continually scratching her hand here there is some erythema but no warmth or signs of infection.  She is to continue to have some itching.  Will trial steroids and topical creams discussed with the patient.  As for cause, contact dermatitis would be my #1 thought given the isolated location and itching with her allergies.  She states that she is allergic to dust mites and has not cleaned her couch as often as she likes and that could be a cause.  There is no signs of bite or laceration or any type of skin trauma that would make me think that this is a bite from one of her cats or the like.  Cause is unknown but she is well-appearing with stable vital signs.  Will discharge patient home and have her follow-up.    Risk  Prescription drug management.            REASSESSMENT            CONSULTS:  None    PROCEDURES:  Unless otherwise noted below, none     Procedures      FINAL IMPRESSION      1. Itching          DISPOSITION/PLAN   DISPOSITION Decision To Discharge 01/27/2024 01:18:39 AM      PATIENT REFERRED TO:  [facility]  [address] [place] [address] [phone]  Schedule an appointment as soon as possible for a visit       Your PCP    Schedule an appointment as soon as possible for a visit         DISCHARGE MEDICATIONS:  Discharge Medication List as of 1/27/2024  1:19 AM        START taking these medications    Details   predniSONE (DELTASONE) 50 MG

## 2024-01-30 NOTE — PROGRESS NOTES
to :  [x]  Other: Full UE/LE volumes and truncal girths. Continue MLD/Axillary Web/Scar Mobilization.       Luis M Bennett, ANICETO,CLT       1/25/2024       8:43 AM

## 2024-02-01 ENCOUNTER — HOSPITAL ENCOUNTER (OUTPATIENT)
Facility: HOSPITAL | Age: 54
Setting detail: RECURRING SERIES
Discharge: HOME | End: 2024-02-04
Payer: COMMERCIAL

## 2024-02-01 PROCEDURE — 97140 MANUAL THERAPY 1/> REGIONS: CPT

## 2024-02-01 NOTE — PROGRESS NOTES
PHYSICAL THERAPY/OCCUPATIONAL THERAPY - DAILY TREATMENT NOTE (updated 3/23)    Date: 2024        Patient Name:  Denis Pitts :  1970   Medical   Diagnosis:  Triple negative breast cancer (HCC) [C50.919]  Lymphedema [I89.0] Treatment Diagnosis:  I89.0     Lymphedema, not elsewhere classified    Referral Source:  Jane Lui DO Insurance:   Payor: [employer] / Plan: [employer] HMO / Product Type: *No Product type* /                   Patient  verified yes     Visit #   Current  / Total 5 12   Time   In / Out 1046    1202   Total Treatment Time 76    Total Timed Codes  76     SUBJECTIVE    Pain Level (0-10 scale): 10 pain  left upper quadrant/axilla    Any medication changes, allergies to medications, adverse drug reactions, diagnosis change, or new procedure performed?: [x] No    [] Yes (see summary sheet for update)  Medications: Verified on Patient Summary List    Subjective functional status/changes:    I wore in my full set of garments.  All the new stuff fits really well  and I am happy with the fit.          OBJECTIVE  Therapeutic Procedures:  Tx Min Billable or 1:1 Min (if diff from Tx Min) Procedure, Rationale, Specifics    5 5  84548 Self Care/Home Management (timed):  improve patient knowledge and understanding of home program and skin care  to improve patient's ability to progress to PLOF and address remaining functional goals.  (see flow sheet as applicable)    Details if applicable:       Compression garment donning and doffing:            Education: Educated patient in compression garment donning and doffing.     Patient arrived with all garments donned and in place but she is wearing the jennifer under the knee highs.  Recommended patient wear the jennifer on top of the knee highs secondary to it will improve knee high suspension as the silicone bands will then be in contact with her skin.  Currently knee highs are sliding down a little bit on arrival secondary to the silicone

## 2024-02-01 NOTE — PROGRESS NOTES
[facility]  a part of [facility]   [address], [address]  [place] [address]  Phone: [phone]  Fax: [phone]    PHYSICAL THERAPY/OCCUPATIONAL THERAPY PROGRESS NOTE  Patient Name:  Denis Pitts :  1970   Treatment/Medical Diagnosis: Triple negative breast cancer (HCC) [C50.919]  Lymphedema [I89.0]   Referral Source:  Jane Lui DO     Date of Initial Visit:  2024 Attended Visits:  5 Missed Visits:  0     SUMMARY OF TREATMENT/ASSESSMENT:  Patient has now received her first set compression garments and she is utilzing them. She arrrived with compression products in place and required some cues donning lower extremity garments.   A reorder was placed for her last treatment session.  Reassessment of upper extremity limb volumes yields decrease in left upper extremity volume by 124.96ml.   Lower extremity volume results were not yet available for documentation within this treatment note.  Please refer to circumferences in paper chart.   Range of motion has improved since evaluation.  Short term goals 1, 2, 3 and 4 are met. Long term goal 1 is met.  All other goals are in progress.     Patient will continue to benefit from skilled PT / OT services to address functional mobility deficits, address ROM deficits, address swelling, analyze and address soft tissue restrictions, analyze compression product fit and use, assess and modify postural abnormalities, instruct in home lymphedema management program, and modify and progress therapeutic interventions to address functional deficits and attain remaining goals.       CURRENT STATUS   Short Term Goals: To be accomplished in 6 treatments    1. Patient will be measured for and obtain comfortable and optimal fitting upper extremity compression products to prevent reaccumulation of fluid at discharge which could impair patient's ability to perform safe mobility and dressing. Goal Met 2024   Status at last

## 2024-02-09 ENCOUNTER — HOSPITAL ENCOUNTER (OUTPATIENT)
Facility: HOSPITAL | Age: 54
Setting detail: RECURRING SERIES
Discharge: HOME | End: 2024-02-12
Payer: COMMERCIAL

## 2024-02-09 PROCEDURE — 97140 MANUAL THERAPY 1/> REGIONS: CPT

## 2024-02-09 PROCEDURE — 97110 THERAPEUTIC EXERCISES: CPT

## 2024-02-09 NOTE — PROGRESS NOTES
PHYSICAL THERAPY/OCCUPATIONAL THERAPY - DAILY TREATMENT NOTE (updated 3/23)      Date: 2024          Patient Name:  Denis Pitts :  1970   Medical   Diagnosis:  Triple negative breast cancer (HCC) [C50.919]  Lymphedema [I89.0] Treatment Diagnosis:  I89.0     Lymphedema, not elsewhere classified    Referral Source:  Jane Lui DO Insurance:   Payor: [employer] / Plan: [employer] HMO / Product Type: *No Product type* /                   Patient  verified yes     Visit #   Current  / Total 6 12   Time   In / Out 12:15 PM 1:45 PM    Total Treatment Time 90   Total Timed Codes 90      SUBJECTIVE    Pain Level (0-10 scale): 4/10 pain  left upper quadrant/axilla    Any medication changes, allergies to medications, adverse drug reactions, diagnosis change, or new procedure performed?: [x] No    [] Yes (see summary sheet for update)  Medications: Verified on Patient Summary List    Subjective functional status/changes:    I wore in my full set of garments.  All the new stuff fits really well  and I am happy with the fit.          OBJECTIVE  Therapeutic Procedures:  Tx Min Billable or 1:1 Min (if diff from Tx Min) Procedure, Rationale, Specifics   15 15 [id number] Therapeutic Exercise (timed):  increase ROM, strength, coordination, balance, and proprioception to improve patient's ability to progress to PLOF and address remaining functional goals. (see flow sheet as applicable)    Details if applicable:     Therapeutic Exercise:  [] Pat Ball Exercises [x] Remedial Lymphedema Exercise Program - active ROM routine [x]Daily exercise program with compression products in place - walking program or riding the stationary bicycle at home    [] Stick Routine     75 06 [id number] Manual Therapy (timed):  decrease pain, increase ROM, decrease edema, and increase postural awareness to improve patient's ability to progress to PLOF and address remaining functional goals.  The manual therapy interventions were performed

## 2024-02-09 NOTE — PROGRESS NOTES
PHYSICAL THERAPY/OCCUPATIONAL THERAPY - DAILY TREATMENT NOTE (updated 3/23)    Date: 2024        Patient Name:  Denis Pitts :  1970   Medical   Diagnosis:  Triple negative breast cancer (HCC) [C50.919]  Lymphedema [I89.0] Treatment Diagnosis:  I89.0     Lymphedema, not elsewhere classified    Referral Source:  Jane Lui DO Insurance:   Payor: [employer] / Plan: [employer] HMO / Product Type: *No Product type* /                   Patient  verified yes     Visit #   Current  / Total 6 12   Time   In / Out 12:15 PM      Total Treatment Time    Total Timed Codes       SUBJECTIVE    Pain Level (0-10 scale):   7/10 in back  4/10 pain  left upper quadrant/axilla    Any medication changes, allergies to medications, adverse drug reactions, diagnosis change, or new procedure performed?: [x] No    [] Yes (see summary sheet for update)  Medications: Verified on Patient Summary List    Subjective functional status/changes:    I wore in my full set of garments.  All the new stuff fits really well  and I am happy with the fit.          OBJECTIVE  Therapeutic Procedures:  Tx Min Billable or 1:1 Min (if diff from Tx Min) Procedure, Rationale, Specifics    5 5  [id number] Self Care/Home Management (timed):  improve patient knowledge and understanding of home program and skin care  to improve patient's ability to progress to PLOF and address remaining functional goals.  (see flow sheet as applicable)    Details if applicable:       Compression garment donning and doffing:            Education: Educated patient in compression garment donning and doffing.     Patient arrived with all garments donned and in place but she is wearing the jennifer under the knee highs.  Recommended patient wear the jennifer on top of the knee highs secondary to it will improve knee high suspension as the silicone bands will then be in contact with her skin.  Currently knee highs are sliding down a little bit on arrival secondary to the

## 2024-02-16 ENCOUNTER — APPOINTMENT (OUTPATIENT)
Facility: HOSPITAL | Age: 54
End: 2024-02-16
Payer: COMMERCIAL

## 2024-02-21 RX ORDER — DILTIAZEM HYDROCHLORIDE 180 MG/1
CAPSULE, COATED, EXTENDED RELEASE ORAL DAILY
Qty: 30 CAPSULE | Refills: 0 | Status: SHIPPED | OUTPATIENT
Start: 2024-02-21

## 2024-02-23 ENCOUNTER — HOSPITAL ENCOUNTER (OUTPATIENT)
Facility: HOSPITAL | Age: 54
Setting detail: RECURRING SERIES
Discharge: HOME | End: 2024-02-26
Payer: COMMERCIAL

## 2024-02-23 PROCEDURE — 97110 THERAPEUTIC EXERCISES: CPT

## 2024-02-23 PROCEDURE — 97140 MANUAL THERAPY 1/> REGIONS: CPT

## 2024-03-01 ENCOUNTER — HOSPITAL ENCOUNTER (OUTPATIENT)
Facility: HOSPITAL | Age: 54
Setting detail: RECURRING SERIES
Discharge: HOME | End: 2024-03-04
Payer: COMMERCIAL

## 2024-03-01 PROCEDURE — 97140 MANUAL THERAPY 1/> REGIONS: CPT

## 2024-03-01 PROCEDURE — 97535 SELF CARE MNGMENT TRAINING: CPT

## 2024-03-11 ENCOUNTER — HOSPITAL ENCOUNTER (OUTPATIENT)
Facility: HOSPITAL | Age: 54
Setting detail: RECURRING SERIES
Discharge: HOME | End: 2024-03-14
Payer: COMMERCIAL

## 2024-03-11 PROCEDURE — 97110 THERAPEUTIC EXERCISES: CPT

## 2024-03-11 PROCEDURE — 97140 MANUAL THERAPY 1/> REGIONS: CPT

## 2024-03-17 PROBLEM — Z86.2 HISTORY OF ANEMIA: Status: ACTIVE | Noted: 2024-03-17

## 2024-03-17 PROBLEM — M48.00 SPINAL STENOSIS: Status: ACTIVE | Noted: 2024-03-17

## 2024-03-17 PROBLEM — Z86.2 HISTORY OF THALASSEMIA: Status: ACTIVE | Noted: 2024-03-17

## 2024-03-17 PROBLEM — Z87.19 HISTORY OF DIVERTICULOSIS: Status: ACTIVE | Noted: 2024-03-17

## 2024-03-17 PROBLEM — Z98.890 HISTORY OF LUMPECTOMY OF LEFT BREAST: Status: ACTIVE | Noted: 2024-03-17

## 2024-03-17 PROBLEM — I10 ESSENTIAL (PRIMARY) HYPERTENSION: Status: ACTIVE | Noted: 2024-03-17

## 2024-03-17 PROBLEM — Z90.710 HISTORY OF TOTAL ABDOMINAL HYSTERECTOMY: Status: ACTIVE | Noted: 2024-03-17

## 2024-03-17 PROBLEM — Z85.3 HISTORY OF LEFT BREAST CANCER: Status: ACTIVE | Noted: 2024-03-17

## 2024-03-17 PROBLEM — Z78.0 POSTMENOPAUSAL: Status: ACTIVE | Noted: 2024-03-17

## 2024-03-19 ENCOUNTER — OFFICE VISIT (OUTPATIENT)
Age: 54
End: 2024-03-19
Payer: COMMERCIAL

## 2024-03-19 VITALS
OXYGEN SATURATION: 96 % | RESPIRATION RATE: 18 BRPM | SYSTOLIC BLOOD PRESSURE: 136 MMHG | DIASTOLIC BLOOD PRESSURE: 67 MMHG | WEIGHT: 293 LBS | TEMPERATURE: 97.9 F | HEART RATE: 89 BPM | BODY MASS INDEX: 49.07 KG/M2

## 2024-03-19 DIAGNOSIS — Z78.0 POSTMENOPAUSAL: ICD-10-CM

## 2024-03-19 DIAGNOSIS — Z98.890 HISTORY OF LUMPECTOMY OF LEFT BREAST: ICD-10-CM

## 2024-03-19 DIAGNOSIS — M54.9 CHRONIC BILATERAL BACK PAIN, UNSPECIFIED BACK LOCATION: ICD-10-CM

## 2024-03-19 DIAGNOSIS — Z86.2 HISTORY OF ANEMIA: ICD-10-CM

## 2024-03-19 DIAGNOSIS — M48.00 SPINAL STENOSIS, UNSPECIFIED SPINAL REGION: ICD-10-CM

## 2024-03-19 DIAGNOSIS — Z12.31 BREAST CANCER SCREENING BY MAMMOGRAM: ICD-10-CM

## 2024-03-19 DIAGNOSIS — G89.29 CHRONIC BILATERAL BACK PAIN, UNSPECIFIED BACK LOCATION: ICD-10-CM

## 2024-03-19 DIAGNOSIS — I10 ESSENTIAL (PRIMARY) HYPERTENSION: ICD-10-CM

## 2024-03-19 DIAGNOSIS — Z85.3 HISTORY OF LEFT BREAST CANCER: Primary | ICD-10-CM

## 2024-03-19 DIAGNOSIS — M79.7 FIBROMYALGIA: ICD-10-CM

## 2024-03-19 DIAGNOSIS — Z87.19 HISTORY OF DIVERTICULOSIS: ICD-10-CM

## 2024-03-19 DIAGNOSIS — Z86.2 HISTORY OF THALASSEMIA: ICD-10-CM

## 2024-03-19 DIAGNOSIS — Z90.710 HISTORY OF TOTAL ABDOMINAL HYSTERECTOMY: ICD-10-CM

## 2024-03-19 PROCEDURE — 3078F DIAST BP <80 MM HG: CPT | Performed by: NURSE PRACTITIONER

## 2024-03-19 PROCEDURE — 3075F SYST BP GE 130 - 139MM HG: CPT | Performed by: NURSE PRACTITIONER

## 2024-03-19 PROCEDURE — 99213 OFFICE O/P EST LOW 20 MIN: CPT | Performed by: NURSE PRACTITIONER

## 2024-03-19 NOTE — PROGRESS NOTES
Denis Pitts is a 53 y.o. female    Chief Complaint   Patient presents with    Follow-up     Triple Negative Left Breast Cancer       1. Have you been to the ER, urgent care clinic since your last visit?  Hospitalized since your last visit? Yes, [facility]  1 Route 1/27/24    2. Have you seen or consulted any other health care providers outside of the [facility] since your last visit?  Include any pap smears or colon screening. Yes, Dr. Sami Gutierrez

## 2024-03-21 ENCOUNTER — HOSPITAL ENCOUNTER (OUTPATIENT)
Facility: HOSPITAL | Age: 54
Setting detail: RECURRING SERIES
Discharge: HOME | End: 2024-03-24
Payer: COMMERCIAL

## 2024-03-21 PROCEDURE — 97140 MANUAL THERAPY 1/> REGIONS: CPT

## 2024-03-21 PROCEDURE — 97763 ORTHC/PROSTC MGMT SBSQ ENC: CPT

## 2024-03-22 ENCOUNTER — APPOINTMENT (OUTPATIENT)
Facility: HOSPITAL | Age: 54
End: 2024-03-22
Payer: COMMERCIAL

## 2024-03-26 NOTE — TELEPHONE ENCOUNTER
Refill request received from [facility] for   Requested Prescriptions     Pending Prescriptions Disp Refills    dilTIAZem (CARDIZEM CD) 180 MG extended release capsule 30 capsule 0     Sig: Take by mouth daily     Last office visit: 10/13/2023   Next office visit: 4/1/2024     Routed to Dr Pawan Snow for review.

## 2024-03-27 RX ORDER — DILTIAZEM HYDROCHLORIDE 180 MG/1
180 CAPSULE, COATED, EXTENDED RELEASE ORAL DAILY
Qty: 90 CAPSULE | Refills: 0 | Status: SHIPPED | OUTPATIENT
Start: 2024-03-27

## 2024-04-04 ENCOUNTER — HOSPITAL ENCOUNTER (OUTPATIENT)
Facility: HOSPITAL | Age: 54
Setting detail: RECURRING SERIES
Discharge: HOME | End: 2024-04-07
Payer: COMMERCIAL

## 2024-04-04 PROCEDURE — 97140 MANUAL THERAPY 1/> REGIONS: CPT

## 2024-04-04 NOTE — PROGRESS NOTES
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   Patient was placed in supine. Modified MLD techniques to work to mid chest secondary to patient reports of noting right UE and right lateral trunk swelling.  Performed techniques to trunk and left upper arm.  Followed with axillary web soft tissue mobilizations with left arm abducted and supported on a pillow and followed with techniques to the left upper extremity including space correction techniques, cord mobilizations with dicem, and twisty mobilization with active elbow flexion and extension. Focused on left arm today.   Foam padding   Patient utilizing all the different clinic made chip pads, Komprex II and her Solaris Swell pad  Encouraged to use compression bra more often with Solaris pad as this will provide best coverage.                  91  91    Total Total     Patient Education: [] Review HEP    [x]  Patient Education billed concurrently with other procedures   [x] MLD Patient Education Reviewed with patient, as well as demonstration and instruction during MLD portion of session and Educated patient in soft tissue mobilization for axillary web  [] Progressed/Changed HEP based on:   [] positioning   [] Kinesiotape   [] Skin care   [] wound care   [x] other: compression garment f .   [x] Compression bandaging/garment precautions reviewed: [x] Yes  [] No    Other Objective/Functional Measures      Left Upper   5,769.68ml today versus 6,652.07ml 2/1/2024  compared to 6,161.99 ml on 1/2/2024.   Right Upper  5,853.09ml today versus 6,424.87 ml 2/1/2024 compared to 5,860.73ml on 1/2/2024.      Percent difference today is 1.43% left smaller than right as compared to 5.15% left larger than right on evaluation.      Left Lower 26366.77ml  4/4/2024 versus  13,426.14ml 2/1/2024  compared to 13,780.06  ml on 1/2/2024.

## 2024-04-04 NOTE — THERAPY RECERTIFICATION
[facility]  a part of [facility]   [address], [address]  [place] [address]  Phone: [phone]  Fax: [phone]    CONTINUED PLAN OF CARE/RECERTIFICATION FOR PHYSICAL THERAPY or OCCUPATIONAL THERAPY        Patient Name:              Denis Pitts :  1970   Treatment/Medical Diagnosis:  Triple negative breast cancer (HCC) [C50.919]  Lymphedema [I89.0]   Onset Date:  10/20/2021     Referral Source:  Jnae Lui DO Start of Care (SOC):  2024   Prior Hospitalization:  See Medical History Provider #:  [id number]      Prior Level of Function (PLOF):  See evaluation   Comorbidities:  See evaluation   Medications:  Verified on Patient Summary List   Visits from SOC:  11 Missed Visits:  1     Progress toward Goals:  Short Term Goals: To be accomplished in 6 treatments    1. Patient will be measured for and obtain comfortable and optimal fitting upper extremity compression products to prevent reaccumulation of fluid at discharge which could impair patient's ability to perform safe mobility and dressing.  Status at last Eval/Progress Note: MET  Current Status: MET  Goal Met?  yes  2.  Patient will be measured for and obtain comfortable and optimal fitting lower  extremity compression products to prevent reaccumulation of fluid at discharge which could impair patient's ability to perform safe mobility and dressing.  Status at last Eval/Progress Note: MET  Current Status: MET  Goal Met?  yes  3. Patient to perform 5/5 lymphedema remedial exercises in session with modified independence utilizing HEP handout, in order to promote optimal independence with management of  condition, as well as promote optimal limb volume reduction required for proper fit of donned clothing.   Status at last Eval/Progress Note: MET  Current Status: MET  Goal Met?  yes  4. Patient will demonstrate decreased volumetric measurements from 6,162 ml in the left upper extremity to 6,060 ml,

## 2024-04-22 ENCOUNTER — HOSPITAL ENCOUNTER (OUTPATIENT)
Facility: HOSPITAL | Age: 54
Setting detail: RECURRING SERIES
Discharge: HOME | End: 2024-04-25
Payer: COMMERCIAL

## 2024-04-22 PROCEDURE — 97110 THERAPEUTIC EXERCISES: CPT

## 2024-04-22 PROCEDURE — 97140 MANUAL THERAPY 1/> REGIONS: CPT

## 2024-04-22 NOTE — PROGRESS NOTES
PHYSICAL THERAPY/OCCUPATIONAL THERAPY - DAILY TREATMENT NOTE (updated 3/23)      Date: 2024        Patient Name:  Denis Pitts :  1970   Medical   Diagnosis:  Triple negative breast cancer (HCC) [C50.919]  Lymphedema [I89.0] Treatment Diagnosis:  I89.0     Lymphedema, not elsewhere classified    Referral Source:  Jane Lui DO Insurance:   Payor: cloud[employer]IQ / Plan: [employer] HMO / Product Type: *No Product type* /                   Patient  verified yes     Visit #   Current  / Total 12 19   Time   In / Out 8:50 AM 10:15 AM   Total Treatment Time 90    Total Timed Codes 90     SUBJECTIVE    Pain Level (0-10 scale):   Weekend 8/10 feet L > R 5/10   4/10 at axilla       Any medication changes, allergies to medications, adverse drug reactions, diagnosis change, or new procedure performed?: [x] No    [] Yes (see summary sheet for update)  Medications: Verified on Patient Summary List    Subjective functional status/changes:    Patient has received her remakes for her Custom arm sleeves that arrived without elbow flexure zone that makes sleeves more comfortable to wear for longer periods of time. Patient returned today with her knee highs and arm sleeve and glove in place.       OBJECTIVE  Therapeutic Procedures:  Tx Min Billable or 1:1 Min (if diff from Tx Min) Procedure, Rationale, Specifics   10 10 05786 Therapeutic Exercise (timed):  increase ROM, strength, coordination, balance, and proprioception to improve patient's ability to progress to PLOF and address remaining functional goals. (see flow sheet as applicable)    Details if applicable:     Therapeutic Exercise:  [] Pat Ball Exercises [x] Remedial Lymphedema Exercise Program - active ROM routine/Axillary Web stretches [x]Daily exercise program with compression products in place - walking program or riding the stationary bicycle at home    [] Stick Routine     80  80 [id number] Manual Therapy (timed):  decrease pain, increase ROM,

## 2024-05-06 ENCOUNTER — APPOINTMENT (OUTPATIENT)
Facility: HOSPITAL | Age: 54
End: 2024-05-06
Payer: COMMERCIAL

## 2024-05-20 ENCOUNTER — HOSPITAL ENCOUNTER (OUTPATIENT)
Facility: HOSPITAL | Age: 54
Setting detail: RECURRING SERIES
Discharge: HOME | End: 2024-05-23
Payer: COMMERCIAL

## 2024-05-20 PROCEDURE — 97140 MANUAL THERAPY 1/> REGIONS: CPT

## 2024-05-20 PROCEDURE — 97535 SELF CARE MNGMENT TRAINING: CPT

## 2024-05-20 PROCEDURE — 97110 THERAPEUTIC EXERCISES: CPT

## 2024-05-20 NOTE — PROGRESS NOTES
PHYSICAL THERAPY/OCCUPATIONAL THERAPY - DAILY TREATMENT NOTE (updated 3/23)      Date: 2024        Patient Name:  Denis Pitts :  1970   Medical   Diagnosis:  Triple negative breast cancer (HCC) [C50.919]  Lymphedema [I89.0] Treatment Diagnosis:  I89.0     Lymphedema, not elsewhere classified    Referral Source:  Jane Lui DO Insurance:   Payor: [employer] / Plan: [employer] HMO / Product Type: *No Product type* /                   Patient  verified yes     Visit #   Current  / Total 13 19   Time   In / Out 2:05 PM 3:35 PM   Total Treatment Time 90   Total Timed Codes 90     SUBJECTIVE    Pain Level (0-10 scale):   With out excedrin today.   Back-8/10  Neck-8/10  L UE-4/10  Lateral trunk and L axilla 6/10 (nerves/ vs cords)  Legs (left ankle 5/10 and Right ankle 4/10)  Left knee 5/10   Right knee 5/10   Bilateral thumbs 4/10    Any medication changes, allergies to medications, adverse drug reactions, diagnosis change, or new procedure performed?: [x] No    [] Yes (see summary sheet for update  Medications: Verified on Patient Summary List    Subjective functional status/changes:   Leave for  vacation 2024 (supplies and garments are ready for trip and patient has called airlines for getting answers for traveling with compression products.  Patient reports that she was ill last appointment that was scheduled in the clinic that she called to cancel.  Patient continues to report that she is unable to reach the areas at axilla, lateral trunk and L UE that she needs to successful mobilize and stretch her cording.       OBJECTIVE  Therapeutic Procedures:  Tx Min Billable or 1:1 Min (if diff from Tx Min) Procedure, Rationale, Specifics   20 65 [id number] Self Care/Home Management (timed):  improve patient knowledge and understanding of pain reducing techniques, positioning, posture/ergonomics, home safety, activity modification, diagnosis/prognosis, and physical therapy expectations,

## 2024-06-03 ENCOUNTER — HOSPITAL ENCOUNTER (OUTPATIENT)
Facility: HOSPITAL | Age: 54
Setting detail: RECURRING SERIES
Discharge: HOME | End: 2024-06-06
Payer: COMMERCIAL

## 2024-06-03 PROCEDURE — 97140 MANUAL THERAPY 1/> REGIONS: CPT

## 2024-06-03 PROCEDURE — 97535 SELF CARE MNGMENT TRAINING: CPT

## 2024-06-03 PROCEDURE — 97110 THERAPEUTIC EXERCISES: CPT

## 2024-06-06 NOTE — PROGRESS NOTES
activities as tolerated  []  Discharge due to :  [x]  Other: Plan to transition to the restorative phase of care over the next 1-2 visits.     ADORE FISHER, PTA, CLT       6/3/2024       6:16 PM

## 2024-06-17 ENCOUNTER — APPOINTMENT (OUTPATIENT)
Facility: HOSPITAL | Age: 54
End: 2024-06-17
Payer: COMMERCIAL

## 2024-06-25 ENCOUNTER — OFFICE VISIT (OUTPATIENT)
Age: 54
End: 2024-06-25
Payer: COMMERCIAL

## 2024-06-25 VITALS
SYSTOLIC BLOOD PRESSURE: 165 MMHG | DIASTOLIC BLOOD PRESSURE: 84 MMHG | HEIGHT: 69 IN | OXYGEN SATURATION: 98 % | WEIGHT: 293 LBS | HEART RATE: 89 BPM | BODY MASS INDEX: 43.4 KG/M2 | RESPIRATION RATE: 16 BRPM

## 2024-06-25 DIAGNOSIS — G44.209 TENSION HEADACHE: ICD-10-CM

## 2024-06-25 DIAGNOSIS — G43.009 MIGRAINE WITHOUT AURA AND WITHOUT STATUS MIGRAINOSUS, NOT INTRACTABLE: Primary | ICD-10-CM

## 2024-06-25 PROCEDURE — 99204 OFFICE O/P NEW MOD 45 MIN: CPT | Performed by: PSYCHIATRY & NEUROLOGY

## 2024-06-25 PROCEDURE — 3077F SYST BP >= 140 MM HG: CPT | Performed by: PSYCHIATRY & NEUROLOGY

## 2024-06-25 PROCEDURE — 3079F DIAST BP 80-89 MM HG: CPT | Performed by: PSYCHIATRY & NEUROLOGY

## 2024-06-25 RX ORDER — TOPIRAMATE 100 MG/1
100 TABLET, FILM COATED ORAL
Qty: 30 TABLET | Refills: 3 | Status: SHIPPED | OUTPATIENT
Start: 2024-06-25

## 2024-06-25 RX ORDER — TOPIRAMATE 25 MG/1
TABLET ORAL
Qty: 42 TABLET | Refills: 0 | Status: SHIPPED | OUTPATIENT
Start: 2024-06-25

## 2024-06-25 NOTE — PATIENT INSTRUCTIONS
manner, we are asking our patients to assist us by calling your Pharmacy for all prescription refills, this will include also your  Mail Order Pharmacy. The pharmacy will contact our office electronically to continue the refill process.    Please do not wait until the last minute to call your pharmacy. We need at least 48 hours (2days) to fill prescriptions. We also encourage you to call your pharmacy before going to  your prescription to make sure it is ready.     With regard to controlled substance prescription refill requests (narcotic refills) that need to be picked up at our office, we ask your cooperation by providing us with at least 72 hours (3days) notice that you will need a refill.    We will not refill narcotic prescription refill requests after 4:00pm on any weekday, Monday through Thursday, or after 2:00pm on Fridays, or on the weekends.      We encourage everyone to explore another way of getting your prescription refill request processed using [employer], our patient web portal through our electronic medical record system. [employer] is an efficient and effective way to communicate your medication request directly to the office and  downloadable as an charlotte on your smart phone . [employer] also features a review functionality that allows you to view your medication list as well as leave messages for your physician. Are you ready to get connected? If so please review the attatched instructions or speak to any of our staff to get you set up right away!    Thank you so much for your cooperation. Should you have any questions please contact our Practice Administrator.  PRIOR AUTHORIZATION FOR MEDICATIONS    If you were prescribed medication during your visit, it may require a prior authorization which is a determination of the medication coverage made by your insurance plan.     This process can take 14 business days for most medications prescribed by our Neurologists.      Botox injections (for therapeutic

## 2024-06-25 NOTE — PROGRESS NOTES
September 18, 2023 where she was following up breast cancer.  She was noted to have had a history of breast cancer with lumpectomy 2016 and she had chemotherapy and XRT.  She was to follow-up in 6 months.    Past Medical History:   Diagnosis Date    Anxiety     Arthritis     Breast cancer (HCC) 07/21/2016    Triple Negative    Cancer (HCC) 2016    LEFT BREAST    Chronic back pain     Fibromyalgia     GERD (gastroesophageal reflux disease)     Heart murmur     NO FOLLOW UP WITH CARDIOLOGY    History of therapeutic radiation 2016    Left Breast Feb-April 2017    Hx antineoplastic chemo     [id number] 2018    Hypertension     IBS (irritable bowel syndrome)     WITH CONSTIPATION    Lymphedema     LEFT    Obesity     PONV (postoperative nausea and vomiting)     Thalassanemia        Past Surgical History:   Procedure Laterality Date    BREAST SURGERY Left 07/21/2016    LUMPECTOMY Triple Negative    BREAST SURGERY Left 10/31/2023    LEFT BREAST EXCISIONAL BIOPSY performed by Sami Solano Jr, MD at [facility] AM[facility]UL[facility] OR    COLONOSCOPY      CT BIOPSY RENAL  02/08/2019    CT BIOPSY RENAL [facility] HISTORICAL    DILATION AND CURETTAGE OF UTERUS      X2    ENDOSCOPY, COLON, DIAGNOSTIC      HERNIA REPAIR  2002    HYSTERECTOMY (CERVIX STATUS UNKNOWN)      HYSTERECTOMY, TOTAL ABDOMINAL (CERVIX REMOVED)  2/11/2022    HYSTERECTOMY, VAGINAL  2/11/2022    KNEE ARTHROSCOPY Bilateral     OTHER SURGICAL HISTORY Right     FOOT AND ANKLE RECONSTRUCTION    OTHER SURGICAL HISTORY Left     LEFT LLOYD REPAIR    TONSILLECTOMY      UPPER GASTROINTESTINAL ENDOSCOPY         Current Outpatient Medications   Medication Sig Dispense Refill    dilTIAZem (CARDIZEM CD) 180 MG extended release capsule Take 1 capsule by mouth daily 90 capsule 0    Fexofenadine HCl (ALLEGRA ALLERGY PO) Take by mouth      Multiple Vitamin (MULTIVITAMIN ADULT PO) Take by mouth      fluticasone (FLONASE) 50 MCG/ACT nasal spray 2 sprays by Each Nostril route daily 16 g

## 2024-07-01 ENCOUNTER — HOSPITAL ENCOUNTER (OUTPATIENT)
Facility: HOSPITAL | Age: 54
Setting detail: RECURRING SERIES
Discharge: HOME | End: 2024-07-04
Payer: COMMERCIAL

## 2024-07-01 PROCEDURE — 97535 SELF CARE MNGMENT TRAINING: CPT

## 2024-07-01 PROCEDURE — 97110 THERAPEUTIC EXERCISES: CPT

## 2024-07-01 PROCEDURE — 97140 MANUAL THERAPY 1/> REGIONS: CPT

## 2024-07-01 NOTE — PROGRESS NOTES
today with a 19% impairment. This is improved from 25 out of 68 with a 37% impairment on evaluation.     Assessment   Patient continues to work on her home program and is making progress towards goals. Axillary cording is improving, but patient continues to have difficulty with self mobilization. Today able to work on right side-lying with pillows to work on positioning to get best self axillary web cord stretch without increased pain. Patient struggles with fibromyalgia as well which impacts her progress. Patient now preparing to transition to the restorative phase of care and plan to discharge over the next 1-2 visits. Patient has met all goals with exception of long term goal 3+5. Patient will return for follow up after her vacation to reassess goals.     Patient will continue to benefit from skilled PT / OT services to address functional mobility deficits, address ROM deficits, address swelling, analyze and address soft tissue restrictions, analyze compression product fit and use, assess and modify postural abnormalities, instruct in home lymphedema management program, and modify and progress therapeutic interventions to address functional deficits and attain remaining goals.    Progress toward goals / Updated goals:  []  See Progress Note/Recertification     Short Term Goals: To be accomplished in 6 treatments    1. Patient will be measured for and obtain comfortable and optimal fitting upper extremity compression products to prevent reaccumulation of fluid at discharge which could impair patient's ability to perform safe mobility and dressing. Goal Met 1/25/2024  2.  Patient will be measured for and obtain comfortable and optimal fitting lower  extremity compression products to prevent reaccumulation of fluid at discharge which could impair patient's ability to perform safe mobility and dressing.Goal Met 1/25/2024  3. Patient to perform 5/5 lymphedema remedial exercises in session with modified independence

## 2024-07-03 ENCOUNTER — OFFICE VISIT (OUTPATIENT)
Age: 54
End: 2024-07-03
Payer: COMMERCIAL

## 2024-07-03 VITALS
DIASTOLIC BLOOD PRESSURE: 71 MMHG | TEMPERATURE: 99.1 F | RESPIRATION RATE: 18 BRPM | OXYGEN SATURATION: 96 % | SYSTOLIC BLOOD PRESSURE: 128 MMHG | HEIGHT: 69 IN | BODY MASS INDEX: 43.4 KG/M2 | HEART RATE: 92 BPM | WEIGHT: 293 LBS

## 2024-07-03 DIAGNOSIS — K95.09 GASTRIC BAND MALFUNCTION: ICD-10-CM

## 2024-07-03 DIAGNOSIS — K22.4 ESOPHAGEAL DYSMOTILITY: ICD-10-CM

## 2024-07-03 DIAGNOSIS — K22.4 ESOPHAGEAL SPASM: Primary | ICD-10-CM
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
## 2024-07-03 DIAGNOSIS — T18.108A DISTAL ESOPHAGEAL OBSTRUCTION DUE TO FOREIGN BODY: ICD-10-CM

## 2024-07-03 PROCEDURE — 3074F SYST BP LT 130 MM HG: CPT | Performed by: SURGERY

## 2024-07-03 PROCEDURE — 3078F DIAST BP <80 MM HG: CPT | Performed by: SURGERY

## 2024-07-03 PROCEDURE — 99214 OFFICE O/P EST MOD 30 MIN: CPT | Performed by: SURGERY

## 2024-07-03 ASSESSMENT — PATIENT HEALTH QUESTIONNAIRE - PHQ9
10. IF YOU CHECKED OFF ANY PROBLEMS, HOW DIFFICULT HAVE THESE PROBLEMS MADE IT FOR YOU TO DO YOUR WORK, TAKE CARE OF THINGS AT HOME, OR GET ALONG WITH OTHER PEOPLE: NOT DIFFICULT AT ALL
SUM OF ALL RESPONSES TO PHQ QUESTIONS 1-9: 14
7. TROUBLE CONCENTRATING ON THINGS, SUCH AS READING THE NEWSPAPER OR WATCHING TELEVISION: SEVERAL DAYS
2. FEELING DOWN, DEPRESSED OR HOPELESS: MORE THAN HALF THE DAYS
1. LITTLE INTEREST OR PLEASURE IN DOING THINGS: MORE THAN HALF THE DAYS
SUM OF ALL RESPONSES TO PHQ QUESTIONS 1-9: 14
SUM OF ALL RESPONSES TO PHQ9 QUESTIONS 1 & 2: 4
9. THOUGHTS THAT YOU WOULD BE BETTER OFF DEAD, OR OF HURTING YOURSELF: NOT AT ALL
4. FEELING TIRED OR HAVING LITTLE ENERGY: NEARLY EVERY DAY
5. POOR APPETITE OR OVEREATING: NEARLY EVERY DAY
SUM OF ALL RESPONSES TO PHQ QUESTIONS 1-9: 14
6. FEELING BAD ABOUT YOURSELF - OR THAT YOU ARE A FAILURE OR HAVE LET YOURSELF OR YOUR FAMILY DOWN: NOT AT ALL
SUM OF ALL RESPONSES TO PHQ QUESTIONS 1-9: 14
3. TROUBLE FALLING OR STAYING ASLEEP: NEARLY EVERY DAY
8. MOVING OR SPEAKING SO SLOWLY THAT OTHER PEOPLE COULD HAVE NOTICED. OR THE OPPOSITE, BEING SO FIGETY OR RESTLESS THAT YOU HAVE BEEN MOVING AROUND A LOT MORE THAN USUAL: NOT AT ALL

## 2024-07-03 NOTE — PROGRESS NOTES
Identified patient with two patient identifiers (name and ). Reviewed chart in preparation for visit and have obtained necessary documentation.    Denis Pitts is a 53 y.o. female  No chief complaint on file.    /71 (Site: Right Lower Arm, Position: Sitting, Cuff Size: Large Adult)   Pulse 92   Temp 99.1 °F (37.3 °C) (Oral)   Resp 18   Ht 1.753 m (5' 9\")   Wt (!) 154.7 kg (341 lb)   SpO2 96%   BMI 50.36 kg/m²     1. Have you been to the ER, urgent care clinic since your last visit?  Hospitalized since your last visit?yes - [facility], Sinus Infection 2 weeks ago.    2. Have you seen or consulted any other health care providers outside of the [facility] since your last visit?  Include any pap smears or colon screening. no  
secondary to gastric band    Plan:     Previous workup was extensive.  Patient needs to have her band removed urgently as this is causing likely permanent changes to her esophagus.  Significant tertiary contractions and spasming with dysphagia and inability to tolerate solids and liquids.  Plan for robotic removal of gastric band and port.  Discussed the risks of bleeding, infection, injury to the liver, stomach, diaphragm, esophagus, and the risk of anesthesia.  We will reassess her esophageal function after this obstructive foreign body has been removed.      Tereso Quijano MD, FACS, [facility]  Bariatric and General Surgeon  [facility]

## 2024-07-10 ENCOUNTER — TELEPHONE (OUTPATIENT)
Age: 54
End: 2024-07-10

## 2024-07-10 NOTE — TELEPHONE ENCOUNTER
Spoke with patient to schedule LAGB removal.  Patient is checking her schedule and will call back when ready to schedule.

## 2024-07-15 ENCOUNTER — APPOINTMENT (OUTPATIENT)
Facility: HOSPITAL | Age: 54
End: 2024-07-15
Payer: COMMERCIAL

## 2024-07-16 ENCOUNTER — PREP FOR PROCEDURE (OUTPATIENT)
Age: 54
End: 2024-07-16

## 2024-07-16 ENCOUNTER — TELEPHONE (OUTPATIENT)
Age: 54
End: 2024-07-16

## 2024-07-16 DIAGNOSIS — K22.4 DYSKINESIA OF ESOPHAGUS: ICD-10-CM

## 2024-07-16 DIAGNOSIS — K95.09 OTHER COMPLICATIONS OF GASTRIC BAND PROCEDURE: ICD-10-CM

## 2024-07-16 NOTE — TELEPHONE ENCOUNTER
Spoke to patient and she informed me she is have a procedure done on 8/5 and was told she needed to wait 8wks.  So I offered 10/4 @ 12:00 with arrival time of 10:00am.  She accepted.  I let her know I will schedule her PAT before surgery and two follow up appointments after surgery.  I let her know I will put all this information in a letter that will post to her my chart today and go out in the mail tomorrow.  She acknowledged ok and thank you

## 2024-07-17 RX ORDER — SODIUM CHLORIDE 0.9 % (FLUSH) 0.9 %
5-40 SYRINGE (ML) INJECTION PRN
OUTPATIENT
Start: 2024-07-17

## 2024-07-17 RX ORDER — SODIUM CHLORIDE 9 MG/ML
INJECTION, SOLUTION INTRAVENOUS PRN
OUTPATIENT
Start: 2024-07-17

## 2024-07-17 RX ORDER — SODIUM CHLORIDE 0.9 % (FLUSH) 0.9 %
5-40 SYRINGE (ML) INJECTION EVERY 12 HOURS SCHEDULED
OUTPATIENT
Start: 2024-07-17

## 2024-07-26 ENCOUNTER — APPOINTMENT (OUTPATIENT)
Facility: HOSPITAL | Age: 54
End: 2024-07-26
Payer: COMMERCIAL

## 2024-07-29 ENCOUNTER — HOSPITAL ENCOUNTER (OUTPATIENT)
Facility: HOSPITAL | Age: 54
Setting detail: RECURRING SERIES
Discharge: HOME | End: 2024-08-01
Payer: COMMERCIAL

## 2024-07-29 PROCEDURE — 97140 MANUAL THERAPY 1/> REGIONS: CPT

## 2024-07-29 PROCEDURE — 97535 SELF CARE MNGMENT TRAINING: CPT

## 2024-07-29 PROCEDURE — 97110 THERAPEUTIC EXERCISES: CPT

## 2024-07-30 NOTE — PROGRESS NOTES
PHYSICAL THERAPY/OCCUPATIONAL THERAPY - DAILY TREATMENT NOTE (updated 3/23)    Date: 2024        Patient Name:  Denis Pitts :  1970   Medical   Diagnosis:  Triple negative breast cancer (HCC) [C50.919]  Lymphedema [I89.0] Treatment Diagnosis:  I89.0     Lymphedema, not elsewhere classified    Referral Source:  Jane Lui DO Insurance:   Payor: [employer] / Plan: [employer] HMO / Product Type: *No Product type* /                   Patient  verified yes     Visit #   Current  / Total 16 19   Time   In / Out 9:05 AM 10:40 AM   Total Treatment Time 95   Total Timed Codes 95     Pain Level (0-10 scale):   Back-4/10  Neck- 6/10   Lateral trunk and L axilla- 2/10  L UE- not moving L UE 0/10 when moving L U 10/10 radiating pain from tennis elbow  Bilateral thumbs- 2/10-3/10  Left foot/ankle 2/10 and Right ankle 2/10  Left knee 7/10  Right knee 7/10    Any medication changes, allergies to medications, adverse drug reactions, diagnosis change, or new procedure performed?: [] No    [x] Yes (see summary sheet for update Scheduled for surgery on Monday for bladder prolapse and October scheduled for Lap Band removal.  Medications: Verified on Patient Summary List    Subjective functional status/changes:   Compression and pump more difficult to use due to elbow pain from lateral epicondylitis(tennis elbow) on the left.   Constant pain with compression arm sleeve in place at the elbow, so has not been wearing recently. She reports that she tolerates her advanced pump and uses twice a day as able, since she is unable to tolerate the arm sleeve. Patient may look into outpatient PT for left elbow if pain continues.   Patient reports that Monday she is scheduled for a procedure for prolapsed bladder. Patient will have an overnight stay and had a list of precautions that she will have to follow for 6 weeks. No lifting, no driving, no pushing, pulling or squating for 6 weeks.   Patient also will have her Lap 
volume of 500 ml in each lower extremity to reduce the risk of infection and progression of swelling over time for ease of performing compression garment donning and doffing and safe mobility.   Status at last Eval: Not Met/In Progress  Current Status: Not Met/In Progress  Goal Met?  no  4.Patient will demonstrate an increase in left shoulder abduction active range of motion of 15 degrees for ease of performing ADL's, including upper body dressing and donning her compression garments.   Status at last Eval: MET  Current Status: MET  Goal Met?  yes  5.  Patient will be able to put her laundry away into an overhead shelf in her closet 3 different times without experiencing left arm pain significant enough to limit her performance of this activity.  Status at last Eval: Not Met/In Progress  Current Status: Not Met/In Progress  Goal Met?  no     RECOMMENDATIONS  Discontinue therapy. Progressing towards or have reached established goals.              ADORE FISHER PTA, CLT       7/29/2024       5:58 PM  ALEM DelaneyT, CLT-JOSE      If you have any questions/comments please contact us directly at [phone].   Thank you for allowing us to assist in the care of your patient.

## 2024-07-31 ENCOUNTER — TELEPHONE (OUTPATIENT)
Age: 54
End: 2024-07-31

## 2024-07-31 NOTE — TELEPHONE ENCOUNTER
----- Message from Lianet Allen sent at 7/30/2024  1:28 PM EDT -----  Regarding: [facility] Appointment Request  [facility] Appointment Request    Patient needs appointment for [facility] Appointment Type: New Patient.    Patient Requested Dates(s): Soonest Available  Patient Requested Time: Anytime  Provider Name: Dr Susan Akins    Reason for Appointment Request: New Patient - Requested Provider unavailable  --------------------------------------------------------------------------------------------------------------------------    Relationship to Patient: Self     Call Back Information: OK to leave message on voicemail  Preferred Call Back Number: Phone [phone] (home)

## 2024-07-31 NOTE — TELEPHONE ENCOUNTER
PSR reached out to patient to inform patient that provider is scheduled into January 2025 - did not wish to schedule at this time

## 2024-08-09 ENCOUNTER — HOSPITAL ENCOUNTER (EMERGENCY)
Facility: HOSPITAL | Age: 54
Discharge: HOME | End: 2024-08-09
Attending: STUDENT IN AN ORGANIZED HEALTH CARE EDUCATION/TRAINING PROGRAM
Payer: COMMERCIAL

## 2024-08-09 VITALS
BODY MASS INDEX: 41.95 KG/M2 | TEMPERATURE: 99.2 F | RESPIRATION RATE: 18 BRPM | OXYGEN SATURATION: 97 % | SYSTOLIC BLOOD PRESSURE: 146 MMHG | DIASTOLIC BLOOD PRESSURE: 84 MMHG | HEART RATE: 94 BPM | WEIGHT: 293 LBS | HEIGHT: 70 IN

## 2024-08-09 DIAGNOSIS — T49.95XA SKIN IRRITATION DUE TO TOPICAL AGENT: Primary | ICD-10-CM

## 2024-08-09 DIAGNOSIS — R23.8 SKIN IRRITATION DUE TO TOPICAL AGENT: Primary | ICD-10-CM

## 2024-08-09 PROCEDURE — 99282 EMERGENCY DEPT VISIT SF MDM: CPT

## 2024-08-09 ASSESSMENT — PAIN - FUNCTIONAL ASSESSMENT: PAIN_FUNCTIONAL_ASSESSMENT: 0-10

## 2024-08-09 ASSESSMENT — ENCOUNTER SYMPTOMS: COLOR CHANGE: 1

## 2024-08-09 ASSESSMENT — PAIN SCALES - GENERAL: PAINLEVEL_OUTOF10: 8

## 2024-08-09 NOTE — ED NOTES
Patient provided discharge instructions, prescriptions, education and follow up information. Pt verbalizing understanding. Pt A&OX4, respirations unlabored on RA. Pain controlled. Patient ambulatory out of ER.

## 2024-08-09 NOTE — DISCHARGE INSTRUCTIONS
To the adhesive glue you can apply an ointment (like Vaseline or Aquaphor)  to help the glue dissolve quicker. To the surrounding redness you can apply over-the-counter cortisone cream to help with the itching and redness.  If you continue to have itching you can trial oral Benadryl as needed.

## 2024-08-09 NOTE — ED PROVIDER NOTES
and/or available family express understanding.          CONSULTS:  None        PROCEDURES:  Unless otherwise noted below, none     Procedures      FINAL IMPRESSION      1. Skin irritation due to topical agent          DISPOSITION/PLAN   DISPOSITION Decision To Discharge 08/09/2024 03:21:47 PM      PATIENT REFERRED TO:  [facility] EMERGENCY DEPT  [address]  [facility] [address]  [phone]    If symptoms worsen    Your Surgeon            DISCHARGE MEDICATIONS:  New Prescriptions    No medications on file         (Please note that portions of this note were completed with a voice recognition program.  Efforts were made to edit the dictations but occasionally words are mis-transcribed.)    Yamilet Faith DO (electronically signed)  Emergency Attending Physician / Physician Assistant / Nurse Practitioner         Yamilte Faith DO  08/09/24 1922

## 2024-09-05 ENCOUNTER — TELEPHONE (OUTPATIENT)
Age: 54
End: 2024-09-05

## 2024-09-16 ENCOUNTER — HOSPITAL ENCOUNTER (OUTPATIENT)
Facility: HOSPITAL | Age: 54
Discharge: HOME | End: 2024-09-19
Payer: COMMERCIAL

## 2024-09-16 VITALS
TEMPERATURE: 98 F | DIASTOLIC BLOOD PRESSURE: 90 MMHG | BODY MASS INDEX: 41.95 KG/M2 | RESPIRATION RATE: 18 BRPM | WEIGHT: 293 LBS | HEART RATE: 73 BPM | HEIGHT: 70 IN | SYSTOLIC BLOOD PRESSURE: 144 MMHG

## 2024-09-16 LAB
ANION GAP SERPL CALC-SCNC: 6 MMOL/L (ref 2–12)
BASOPHILS # BLD: 0 K/UL (ref 0–0.1)
BASOPHILS NFR BLD: 0 % (ref 0–1)
BUN SERPL-MCNC: 7 MG/DL (ref 6–20)
BUN/CREAT SERPL: 9 (ref 12–20)
CALCIUM SERPL-MCNC: 9.1 MG/DL (ref 8.5–10.1)
CHLORIDE SERPL-SCNC: 105 MMOL/L (ref 97–108)
CO2 SERPL-SCNC: 29 MMOL/L (ref 21–32)
CREAT SERPL-MCNC: 0.74 MG/DL (ref 0.55–1.02)
DIFFERENTIAL METHOD BLD: ABNORMAL
EKG ATRIAL RATE: 71 BPM
EKG DIAGNOSIS: NORMAL
EKG P AXIS: 50 DEGREES
EKG P-R INTERVAL: 186 MS
EKG Q-T INTERVAL: 406 MS
EKG QRS DURATION: 90 MS
EKG QTC CALCULATION (BAZETT): 441 MS
EKG R AXIS: -21 DEGREES
EKG T AXIS: 49 DEGREES
EKG VENTRICULAR RATE: 71 BPM
EOSINOPHIL # BLD: 0.2 K/UL (ref 0–0.4)
EOSINOPHIL NFR BLD: 3 % (ref 0–7)
ERYTHROCYTE [DISTWIDTH] IN BLOOD BY AUTOMATED COUNT: 14.8 % (ref 11.5–14.5)
GLUCOSE SERPL-MCNC: 104 MG/DL (ref 65–100)
HCT VFR BLD AUTO: 35 % (ref 35–47)
HGB BLD-MCNC: 11 G/DL (ref 11.5–16)
IMM GRANULOCYTES # BLD AUTO: 0 K/UL (ref 0–0.04)
IMM GRANULOCYTES NFR BLD AUTO: 0 % (ref 0–0.5)
LYMPHOCYTES # BLD: 2.4 K/UL (ref 0.8–3.5)
LYMPHOCYTES NFR BLD: 31 % (ref 12–49)
MCH RBC QN AUTO: 26.4 PG (ref 26–34)
MCHC RBC AUTO-ENTMCNC: 31.4 G/DL (ref 30–36.5)
MCV RBC AUTO: 84.1 FL (ref 80–99)
MONOCYTES # BLD: 0.5 K/UL (ref 0–1)
MONOCYTES NFR BLD: 7 % (ref 5–13)
NEUTS SEG # BLD: 4.7 K/UL (ref 1.8–8)
NEUTS SEG NFR BLD: 59 % (ref 32–75)
NRBC # BLD: 0 K/UL (ref 0–0.01)
NRBC BLD-RTO: 0 PER 100 WBC
PLATELET # BLD AUTO: 254 K/UL (ref 150–400)
PMV BLD AUTO: 10.3 FL (ref 8.9–12.9)
POTASSIUM SERPL-SCNC: 3.3 MMOL/L (ref 3.5–5.1)
RBC # BLD AUTO: 4.16 M/UL (ref 3.8–5.2)
SODIUM SERPL-SCNC: 140 MMOL/L (ref 136–145)
WBC # BLD AUTO: 7.9 K/UL (ref 3.6–11)

## 2024-09-16 PROCEDURE — 80048 BASIC METABOLIC PNL TOTAL CA: CPT

## 2024-09-16 PROCEDURE — 93005 ELECTROCARDIOGRAM TRACING: CPT | Performed by: SURGERY

## 2024-09-16 PROCEDURE — 85025 COMPLETE CBC W/AUTO DIFF WBC: CPT

## 2024-09-16 PROCEDURE — 36415 COLL VENOUS BLD VENIPUNCTURE: CPT

## 2024-09-16 RX ORDER — PREGABALIN 75 MG/1
75 CAPSULE ORAL 2 TIMES DAILY
COMMUNITY

## 2024-09-16 RX ORDER — DOCUSATE SODIUM 100 MG/1
100 CAPSULE, LIQUID FILLED ORAL 2 TIMES DAILY
COMMUNITY

## 2024-09-16 RX ORDER — LANOLIN ALCOHOL/MO/W.PET/CERES
400 CREAM (GRAM) TOPICAL DAILY
COMMUNITY

## 2024-09-16 RX ORDER — PLECANATIDE 3 MG/1
1 TABLET ORAL DAILY
COMMUNITY

## 2024-09-16 ASSESSMENT — PAIN DESCRIPTION - LOCATION: LOCATION: PERINEUM;RECTUM

## 2024-09-16 ASSESSMENT — PAIN DESCRIPTION - DESCRIPTORS: DESCRIPTORS: SORE;OTHER (COMMENT)

## 2024-09-16 ASSESSMENT — PAIN DESCRIPTION - ORIENTATION: ORIENTATION: LOWER

## 2024-09-16 ASSESSMENT — PAIN DESCRIPTION - FREQUENCY: FREQUENCY: CONTINUOUS

## 2024-09-16 ASSESSMENT — PAIN DESCRIPTION - PAIN TYPE: TYPE: SURGICAL PAIN

## 2024-09-16 ASSESSMENT — PAIN SCALES - GENERAL: PAINLEVEL_OUTOF10: 3

## 2024-09-24 ENCOUNTER — TELEPHONE (OUTPATIENT)
Age: 54
End: 2024-09-24

## 2024-09-25 ENCOUNTER — TELEPHONE (OUTPATIENT)
Age: 54
End: 2024-09-25

## 2024-09-25 NOTE — TELEPHONE ENCOUNTER
Geronimo lopez authorization called and stated a procedure code needs to be on file for the procedure on 10/4/24. Ext [phone]

## 2024-10-01 ENCOUNTER — OFFICE VISIT (OUTPATIENT)
Age: 54
End: 2024-10-01
Payer: COMMERCIAL

## 2024-10-01 VITALS
WEIGHT: 293 LBS | HEIGHT: 70 IN | DIASTOLIC BLOOD PRESSURE: 80 MMHG | RESPIRATION RATE: 18 BRPM | HEART RATE: 84 BPM | OXYGEN SATURATION: 99 % | BODY MASS INDEX: 41.95 KG/M2 | SYSTOLIC BLOOD PRESSURE: 124 MMHG

## 2024-10-01 DIAGNOSIS — G43.009 MIGRAINE WITHOUT AURA AND WITHOUT STATUS MIGRAINOSUS, NOT INTRACTABLE: Primary | ICD-10-CM

## 2024-10-01 PROCEDURE — 3074F SYST BP LT 130 MM HG: CPT | Performed by: PSYCHIATRY & NEUROLOGY

## 2024-10-01 PROCEDURE — 3079F DIAST BP 80-89 MM HG: CPT | Performed by: PSYCHIATRY & NEUROLOGY

## 2024-10-01 PROCEDURE — 99214 OFFICE O/P EST MOD 30 MIN: CPT | Performed by: PSYCHIATRY & NEUROLOGY

## 2024-10-01 ASSESSMENT — PATIENT HEALTH QUESTIONNAIRE - PHQ9
1. LITTLE INTEREST OR PLEASURE IN DOING THINGS: NOT AT ALL
SUM OF ALL RESPONSES TO PHQ QUESTIONS 1-9: 0
2. FEELING DOWN, DEPRESSED OR HOPELESS: NOT AT ALL
SUM OF ALL RESPONSES TO PHQ QUESTIONS 1-9: 0
SUM OF ALL RESPONSES TO PHQ9 QUESTIONS 1 & 2: 0
SUM OF ALL RESPONSES TO PHQ QUESTIONS 1-9: 0
SUM OF ALL RESPONSES TO PHQ QUESTIONS 1-9: 0

## 2024-10-01 NOTE — PROGRESS NOTES
facility-administered medications for this visit.      Allergies   Allergen Reactions    Adhesive Tape Other (See Comments)     Tears skin off. Severe.    Dermabond Rash and Other (See Comments)     SKIN PEELING, BURNING    Oxycodone Itching    Gabapentin Other (See Comments)    Lisinopril Other (See Comments)     \"constant tickle in my throat\"  \"constant tickle in my throat\"    Molds & Smuts Other (See Comments)     NASAL CONGESTION    Other      Other reaction(s): Sneezing  PT STATES SHE IS ALLERGIC TO HER SWEAT WHICH CAUSES REDNESS AND ITCHING AND SWELLING    Sulfa Antibiotics Hives and Other (See Comments)    Sulfamethoxazole-Trimethoprim Hives    Trimethoprim Hives    Nickel Rash and Other (See Comments)     Social History     Tobacco Use    Smoking status: Never    Smokeless tobacco: Never   Vaping Use    Vaping status: Never Used   Substance Use Topics    Alcohol use: Never    Drug use: Never     54-year-old lady following up today for worsening low level headaches and infrequent migraine.  Last visit we started her on Topamax. She stopped the Topamax as the combination of her Lyrica and Topamax made her too sleepy.  She notes that she has been doing well in terms of headache frequency.  She did miss a dose of Lyrica and had a bad headache.    Laboratory analysis  Except for 16 2024 basic metabolic panel with potassium slightly low at 3.3 otherwise unremarkable  CBC with hemoglobin of 11 otherwise unremarkable    Record review finds emergency department visit August 9, 2024 where she came in for allergic reaction    Examination  /80   Pulse 84   Resp 18   Ht 1.772 m (5' 9.75\")   Wt (!) 152 kg (335 lb)   SpO2 99%   BMI 48.41 kg/m²   She is a pleasant engaging lady.  She is awake alert and oriented with normal speech and language.  Cognition is normal.  No ataxia.  Steady gait    Impression/Plan  Migraines stable and we discussed that we have used Lyrica in the past to help with headache modulation

## 2024-10-02 LAB
25(OH)D3+25(OH)D2 SERPL-MCNC: 36.3 NG/ML (ref 30–100)
CHOLEST SERPL-MCNC: 201 MG/DL (ref 100–199)
HBA1C MFR BLD: 6 % (ref 4.8–5.6)
HDLC SERPL-MCNC: 39 MG/DL
IMP & REVIEW OF LAB RESULTS: NORMAL
LDLC SERPL CALC-MCNC: 139 MG/DL (ref 0–99)
TRIGL SERPL-MCNC: 129 MG/DL (ref 0–149)
TSH SERPL DL<=0.005 MIU/L-ACNC: 3.2 UIU/ML (ref 0.45–4.5)
VLDLC SERPL CALC-MCNC: 23 MG/DL (ref 5–40)

## 2024-10-04 ENCOUNTER — ANESTHESIA EVENT (OUTPATIENT)
Facility: HOSPITAL | Age: 54
End: 2024-10-04
Payer: COMMERCIAL

## 2024-10-04 ENCOUNTER — ANESTHESIA (OUTPATIENT)
Facility: HOSPITAL | Age: 54
End: 2024-10-04
Payer: COMMERCIAL

## 2024-10-04 ENCOUNTER — HOSPITAL ENCOUNTER (OUTPATIENT)
Facility: HOSPITAL | Age: 54
Setting detail: OUTPATIENT SURGERY
Discharge: HOME | End: 2024-10-04
Attending: SURGERY | Admitting: SURGERY
Payer: COMMERCIAL

## 2024-10-04 VITALS
DIASTOLIC BLOOD PRESSURE: 98 MMHG | OXYGEN SATURATION: 98 % | SYSTOLIC BLOOD PRESSURE: 157 MMHG | HEART RATE: 75 BPM | RESPIRATION RATE: 18 BRPM | HEIGHT: 70 IN | BODY MASS INDEX: 41.95 KG/M2 | TEMPERATURE: 97.4 F | WEIGHT: 293 LBS

## 2024-10-04 DIAGNOSIS — K95.09 OTHER COMPLICATIONS OF GASTRIC BAND PROCEDURE: Primary | ICD-10-CM

## 2024-10-04 PROCEDURE — 6360000002 HC RX W HCPCS: Performed by: NURSE ANESTHETIST, CERTIFIED REGISTERED

## 2024-10-04 PROCEDURE — 2500000003 HC RX 250 WO HCPCS: Performed by: NURSE ANESTHETIST, CERTIFIED REGISTERED

## 2024-10-04 PROCEDURE — 7100000011 HC PHASE II RECOVERY - ADDTL 15 MIN: Performed by: SURGERY

## 2024-10-04 PROCEDURE — 3700000001 HC ADD 15 MINUTES (ANESTHESIA): Performed by: SURGERY

## 2024-10-04 PROCEDURE — 7100000010 HC PHASE II RECOVERY - FIRST 15 MIN: Performed by: SURGERY

## 2024-10-04 PROCEDURE — 3600000019 HC SURGERY ROBOT ADDTL 15MIN: Performed by: SURGERY

## 2024-10-04 PROCEDURE — 2580000003 HC RX 258: Performed by: ANESTHESIOLOGY

## 2024-10-04 PROCEDURE — S2900 ROBOTIC SURGICAL SYSTEM: HCPCS | Performed by: SURGERY

## 2024-10-04 PROCEDURE — 6360000002 HC RX W HCPCS: Performed by: ANESTHESIOLOGY

## 2024-10-04 PROCEDURE — 7100000001 HC PACU RECOVERY - ADDTL 15 MIN: Performed by: SURGERY

## 2024-10-04 PROCEDURE — 7100000000 HC PACU RECOVERY - FIRST 15 MIN: Performed by: SURGERY

## 2024-10-04 PROCEDURE — 2720000010 HC SURG SUPPLY STERILE: Performed by: SURGERY

## 2024-10-04 PROCEDURE — 2709999900 HC NON-CHARGEABLE SUPPLY: Performed by: SURGERY

## 2024-10-04 PROCEDURE — 88300 SURGICAL PATH GROSS: CPT

## 2024-10-04 PROCEDURE — 3700000000 HC ANESTHESIA ATTENDED CARE: Performed by: SURGERY

## 2024-10-04 PROCEDURE — 43774 LAP RMVL GASTR ADJ ALL PARTS: CPT | Performed by: SURGERY

## 2024-10-04 PROCEDURE — 2580000003 HC RX 258: Performed by: NURSE ANESTHETIST, CERTIFIED REGISTERED

## 2024-10-04 PROCEDURE — 43774 LAP RMVL GASTR ADJ ALL PARTS: CPT | Performed by: PHYSICIAN ASSISTANT

## 2024-10-04 PROCEDURE — 6370000000 HC RX 637 (ALT 250 FOR IP): Performed by: SURGERY

## 2024-10-04 PROCEDURE — 3600000009 HC SURGERY ROBOT BASE: Performed by: SURGERY

## 2024-10-04 PROCEDURE — 2500000003 HC RX 250 WO HCPCS: Performed by: SURGERY

## 2024-10-04 RX ORDER — SODIUM CHLORIDE 9 MG/ML
INJECTION, SOLUTION INTRAVENOUS CONTINUOUS
Status: DISCONTINUED | OUTPATIENT
Start: 2024-10-04 | End: 2024-10-04 | Stop reason: HOSPADM

## 2024-10-04 RX ORDER — NEOSTIGMINE METHYLSULFATE 1 MG/ML
INJECTION, SOLUTION INTRAVENOUS
Status: DISCONTINUED | OUTPATIENT
Start: 2024-10-04 | End: 2024-10-04 | Stop reason: SDUPTHER

## 2024-10-04 RX ORDER — SODIUM CHLORIDE 9 MG/ML
INJECTION, SOLUTION INTRAVENOUS PRN
Status: DISCONTINUED | OUTPATIENT
Start: 2024-10-04 | End: 2024-10-04 | Stop reason: HOSPADM

## 2024-10-04 RX ORDER — SODIUM CHLORIDE, SODIUM LACTATE, POTASSIUM CHLORIDE, CALCIUM CHLORIDE 600; 310; 30; 20 MG/100ML; MG/100ML; MG/100ML; MG/100ML
INJECTION, SOLUTION INTRAVENOUS CONTINUOUS
Status: CANCELLED | OUTPATIENT
Start: 2024-10-04

## 2024-10-04 RX ORDER — FENTANYL CITRATE 50 UG/ML
100 INJECTION, SOLUTION INTRAMUSCULAR; INTRAVENOUS
Status: CANCELLED | OUTPATIENT
Start: 2024-10-04 | End: 2024-10-05

## 2024-10-04 RX ORDER — ONDANSETRON 2 MG/ML
4 INJECTION INTRAMUSCULAR; INTRAVENOUS ONCE
Status: CANCELLED | OUTPATIENT
Start: 2024-10-04 | End: 2024-10-04

## 2024-10-04 RX ORDER — MIDAZOLAM HYDROCHLORIDE 2 MG/2ML
2 INJECTION, SOLUTION INTRAMUSCULAR; INTRAVENOUS
Status: DISCONTINUED | OUTPATIENT
Start: 2024-10-04 | End: 2024-10-04 | Stop reason: HOSPADM

## 2024-10-04 RX ORDER — ONDANSETRON 2 MG/ML
4 INJECTION INTRAMUSCULAR; INTRAVENOUS
Status: COMPLETED | OUTPATIENT
Start: 2024-10-04 | End: 2024-10-04

## 2024-10-04 RX ORDER — SUCCINYLCHOLINE/SOD CL,ISO/PF 200MG/10ML
SYRINGE (ML) INTRAVENOUS
Status: DISCONTINUED | OUTPATIENT
Start: 2024-10-04 | End: 2024-10-04 | Stop reason: SDUPTHER

## 2024-10-04 RX ORDER — SODIUM CHLORIDE 9 MG/ML
INJECTION, SOLUTION INTRAVENOUS CONTINUOUS
Status: CANCELLED | OUTPATIENT
Start: 2024-10-04

## 2024-10-04 RX ORDER — LIDOCAINE HYDROCHLORIDE 20 MG/ML
INJECTION, SOLUTION EPIDURAL; INFILTRATION; INTRACAUDAL; PERINEURAL
Status: DISCONTINUED | OUTPATIENT
Start: 2024-10-04 | End: 2024-10-04 | Stop reason: SDUPTHER

## 2024-10-04 RX ORDER — NALOXONE HYDROCHLORIDE 0.4 MG/ML
INJECTION, SOLUTION INTRAMUSCULAR; INTRAVENOUS; SUBCUTANEOUS PRN
Status: DISCONTINUED | OUTPATIENT
Start: 2024-10-04 | End: 2024-10-04 | Stop reason: HOSPADM

## 2024-10-04 RX ORDER — ONDANSETRON 2 MG/ML
INJECTION INTRAMUSCULAR; INTRAVENOUS
Status: DISCONTINUED | OUTPATIENT
Start: 2024-10-04 | End: 2024-10-04 | Stop reason: SDUPTHER

## 2024-10-04 RX ORDER — DEXAMETHASONE SODIUM PHOSPHATE 4 MG/ML
INJECTION, SOLUTION INTRA-ARTICULAR; INTRALESIONAL; INTRAMUSCULAR; INTRAVENOUS; SOFT TISSUE
Status: DISCONTINUED | OUTPATIENT
Start: 2024-10-04 | End: 2024-10-04 | Stop reason: SDUPTHER

## 2024-10-04 RX ORDER — SODIUM CHLORIDE, SODIUM LACTATE, POTASSIUM CHLORIDE, CALCIUM CHLORIDE 600; 310; 30; 20 MG/100ML; MG/100ML; MG/100ML; MG/100ML
INJECTION, SOLUTION INTRAVENOUS CONTINUOUS
Status: DISCONTINUED | OUTPATIENT
Start: 2024-10-04 | End: 2024-10-04 | Stop reason: HOSPADM

## 2024-10-04 RX ORDER — SODIUM CHLORIDE 0.9 % (FLUSH) 0.9 %
5-40 SYRINGE (ML) INJECTION PRN
Status: DISCONTINUED | OUTPATIENT
Start: 2024-10-04 | End: 2024-10-04 | Stop reason: HOSPADM

## 2024-10-04 RX ORDER — POLYETHYLENE GLYCOL 3350 17 G/17G
17 POWDER, FOR SOLUTION ORAL DAILY PRN
Qty: 14 EACH | Refills: 0 | Status: SHIPPED | OUTPATIENT
Start: 2024-10-04

## 2024-10-04 RX ORDER — SODIUM CHLORIDE 0.9 % (FLUSH) 0.9 %
5-40 SYRINGE (ML) INJECTION EVERY 12 HOURS SCHEDULED
Status: DISCONTINUED | OUTPATIENT
Start: 2024-10-04 | End: 2024-10-04 | Stop reason: HOSPADM

## 2024-10-04 RX ORDER — MIDAZOLAM HYDROCHLORIDE 5 MG/5ML
5 INJECTION, SOLUTION INTRAMUSCULAR; INTRAVENOUS
Status: DISCONTINUED | OUTPATIENT
Start: 2024-10-04 | End: 2024-10-04 | Stop reason: HOSPADM

## 2024-10-04 RX ORDER — DEXMEDETOMIDINE HYDROCHLORIDE 100 UG/ML
INJECTION, SOLUTION INTRAVENOUS
Status: DISCONTINUED | OUTPATIENT
Start: 2024-10-04 | End: 2024-10-04 | Stop reason: SDUPTHER

## 2024-10-04 RX ORDER — DIPHENHYDRAMINE HYDROCHLORIDE 50 MG/ML
12.5 INJECTION INTRAMUSCULAR; INTRAVENOUS
Status: DISCONTINUED | OUTPATIENT
Start: 2024-10-04 | End: 2024-10-04 | Stop reason: HOSPADM

## 2024-10-04 RX ORDER — MIDAZOLAM HYDROCHLORIDE 1 MG/ML
INJECTION INTRAMUSCULAR; INTRAVENOUS
Status: DISCONTINUED | OUTPATIENT
Start: 2024-10-04 | End: 2024-10-04 | Stop reason: SDUPTHER

## 2024-10-04 RX ORDER — HYDRALAZINE HYDROCHLORIDE 20 MG/ML
10 INJECTION INTRAMUSCULAR; INTRAVENOUS
Status: DISCONTINUED | OUTPATIENT
Start: 2024-10-04 | End: 2024-10-04 | Stop reason: HOSPADM

## 2024-10-04 RX ORDER — SODIUM CHLORIDE 0.9 % (FLUSH) 0.9 %
5-40 SYRINGE (ML) INJECTION PRN
Status: CANCELLED | OUTPATIENT
Start: 2024-10-04

## 2024-10-04 RX ORDER — SODIUM CHLORIDE 0.9 % (FLUSH) 0.9 %
5-40 SYRINGE (ML) INJECTION EVERY 12 HOURS SCHEDULED
Status: CANCELLED | OUTPATIENT
Start: 2024-10-04

## 2024-10-04 RX ORDER — ONDANSETRON 2 MG/ML
4 INJECTION INTRAMUSCULAR; INTRAVENOUS ONCE
Status: DISCONTINUED | OUTPATIENT
Start: 2024-10-04 | End: 2024-10-04 | Stop reason: HOSPADM

## 2024-10-04 RX ORDER — FENTANYL CITRATE 50 UG/ML
50 INJECTION, SOLUTION INTRAMUSCULAR; INTRAVENOUS EVERY 5 MIN PRN
Status: COMPLETED | OUTPATIENT
Start: 2024-10-04 | End: 2024-10-04

## 2024-10-04 RX ORDER — HYDROMORPHONE HYDROCHLORIDE 2 MG/1
2 TABLET ORAL EVERY 6 HOURS PRN
Qty: 10 TABLET | Refills: 0 | Status: SHIPPED | OUTPATIENT
Start: 2024-10-04 | End: 2024-10-07

## 2024-10-04 RX ORDER — FENTANYL CITRATE 50 UG/ML
INJECTION, SOLUTION INTRAMUSCULAR; INTRAVENOUS
Status: DISCONTINUED | OUTPATIENT
Start: 2024-10-04 | End: 2024-10-04 | Stop reason: SDUPTHER

## 2024-10-04 RX ORDER — BUPIVACAINE HYDROCHLORIDE AND EPINEPHRINE 5; 5 MG/ML; UG/ML
INJECTION, SOLUTION PERINEURAL PRN
Status: DISCONTINUED | OUTPATIENT
Start: 2024-10-04 | End: 2024-10-04 | Stop reason: HOSPADM

## 2024-10-04 RX ORDER — FENTANYL CITRATE 50 UG/ML
100 INJECTION, SOLUTION INTRAMUSCULAR; INTRAVENOUS
Status: DISCONTINUED | OUTPATIENT
Start: 2024-10-04 | End: 2024-10-04 | Stop reason: HOSPADM

## 2024-10-04 RX ORDER — MIDAZOLAM HYDROCHLORIDE 5 MG/5ML
5 INJECTION, SOLUTION INTRAMUSCULAR; INTRAVENOUS
Status: CANCELLED | OUTPATIENT
Start: 2024-10-04 | End: 2024-10-05

## 2024-10-04 RX ORDER — ROCURONIUM BROMIDE 10 MG/ML
INJECTION, SOLUTION INTRAVENOUS
Status: DISCONTINUED | OUTPATIENT
Start: 2024-10-04 | End: 2024-10-04 | Stop reason: SDUPTHER

## 2024-10-04 RX ORDER — SODIUM CHLORIDE 9 MG/ML
INJECTION, SOLUTION INTRAVENOUS PRN
Status: CANCELLED | OUTPATIENT
Start: 2024-10-04

## 2024-10-04 RX ORDER — MEPERIDINE HYDROCHLORIDE 25 MG/ML
12.5 INJECTION INTRAMUSCULAR; INTRAVENOUS; SUBCUTANEOUS EVERY 5 MIN PRN
Status: DISCONTINUED | OUTPATIENT
Start: 2024-10-04 | End: 2024-10-04 | Stop reason: HOSPADM

## 2024-10-04 RX ORDER — GLYCOPYRROLATE 0.2 MG/ML
INJECTION INTRAMUSCULAR; INTRAVENOUS
Status: DISCONTINUED | OUTPATIENT
Start: 2024-10-04 | End: 2024-10-04 | Stop reason: SDUPTHER

## 2024-10-04 RX ORDER — HYDROMORPHONE HYDROCHLORIDE 2 MG/1
2 TABLET ORAL ONCE
Status: COMPLETED | OUTPATIENT
Start: 2024-10-04 | End: 2024-10-04

## 2024-10-04 RX ORDER — PROCHLORPERAZINE EDISYLATE 5 MG/ML
5 INJECTION INTRAMUSCULAR; INTRAVENOUS
Status: DISCONTINUED | OUTPATIENT
Start: 2024-10-04 | End: 2024-10-04 | Stop reason: HOSPADM

## 2024-10-04 RX ORDER — PROPOFOL 10 MG/ML
INJECTION, EMULSION INTRAVENOUS
Status: DISCONTINUED | OUTPATIENT
Start: 2024-10-04 | End: 2024-10-04 | Stop reason: SDUPTHER

## 2024-10-04 RX ADMIN — DEXMEDETOMIDINE 10 MCG: 100 INJECTION, SOLUTION, CONCENTRATE INTRAVENOUS at 07:25

## 2024-10-04 RX ADMIN — Medication 30 MG: at 07:25

## 2024-10-04 RX ADMIN — HYDROMORPHONE HYDROCHLORIDE 2 MG: 2 TABLET ORAL at 10:56

## 2024-10-04 RX ADMIN — FENTANYL CITRATE 50 MCG: 50 INJECTION INTRAMUSCULAR; INTRAVENOUS at 09:21

## 2024-10-04 RX ADMIN — HYDROMORPHONE HYDROCHLORIDE 0.5 MG: 1 INJECTION, SOLUTION INTRAMUSCULAR; INTRAVENOUS; SUBCUTANEOUS at 08:40

## 2024-10-04 RX ADMIN — LIDOCAINE HYDROCHLORIDE 80 MG: 20 INJECTION, SOLUTION EPIDURAL; INFILTRATION; INTRACAUDAL; PERINEURAL at 07:36

## 2024-10-04 RX ADMIN — FENTANYL CITRATE 50 MCG: 50 INJECTION, SOLUTION INTRAMUSCULAR; INTRAVENOUS at 07:36

## 2024-10-04 RX ADMIN — ROCURONIUM BROMIDE 20 MG: 10 INJECTION, SOLUTION INTRAVENOUS at 08:01

## 2024-10-04 RX ADMIN — HYDROMORPHONE HYDROCHLORIDE 0.5 MG: 1 INJECTION, SOLUTION INTRAMUSCULAR; INTRAVENOUS; SUBCUTANEOUS at 08:28

## 2024-10-04 RX ADMIN — GLYCOPYRROLATE 0.8 MG: 0.2 INJECTION INTRAMUSCULAR; INTRAVENOUS at 08:21

## 2024-10-04 RX ADMIN — NEOSTIGMINE METHYLSULFATE 5 MG: 1 INJECTION, SOLUTION INTRAVENOUS at 08:21

## 2024-10-04 RX ADMIN — SODIUM CHLORIDE 3000 MG: 9 INJECTION, SOLUTION INTRAVENOUS at 07:45

## 2024-10-04 RX ADMIN — MIDAZOLAM 2 MG: 1 INJECTION INTRAMUSCULAR; INTRAVENOUS at 07:25

## 2024-10-04 RX ADMIN — ONDANSETRON 4 MG: 2 INJECTION INTRAMUSCULAR; INTRAVENOUS at 09:06

## 2024-10-04 RX ADMIN — ONDANSETRON 4 MG: 2 INJECTION INTRAMUSCULAR; INTRAVENOUS at 07:55

## 2024-10-04 RX ADMIN — FENTANYL CITRATE 50 MCG: 50 INJECTION INTRAMUSCULAR; INTRAVENOUS at 09:09

## 2024-10-04 RX ADMIN — DEXAMETHASONE SODIUM PHOSPHATE 4 MG: 4 INJECTION, SOLUTION INTRAMUSCULAR; INTRAVENOUS at 07:55

## 2024-10-04 RX ADMIN — ROCURONIUM BROMIDE 45 MG: 10 INJECTION, SOLUTION INTRAVENOUS at 07:45

## 2024-10-04 RX ADMIN — PROPOFOL 200 MG: 10 INJECTION, EMULSION INTRAVENOUS at 07:36

## 2024-10-04 RX ADMIN — SODIUM CHLORIDE, POTASSIUM CHLORIDE, SODIUM LACTATE AND CALCIUM CHLORIDE: 600; 310; 30; 20 INJECTION, SOLUTION INTRAVENOUS at 07:25

## 2024-10-04 RX ADMIN — FENTANYL CITRATE 50 MCG: 50 INJECTION, SOLUTION INTRAMUSCULAR; INTRAVENOUS at 07:30

## 2024-10-04 RX ADMIN — ROCURONIUM BROMIDE 5 MG: 10 INJECTION, SOLUTION INTRAVENOUS at 07:36

## 2024-10-04 RX ADMIN — Medication 200 MG: at 07:37

## 2024-10-04 ASSESSMENT — PAIN SCALES - GENERAL
PAINLEVEL_OUTOF10: 10
PAINLEVEL_OUTOF10: 7
PAINLEVEL_OUTOF10: 6
PAINLEVEL_OUTOF10: 4
PAINLEVEL_OUTOF10: 4
PAINLEVEL_OUTOF10: 0

## 2024-10-04 ASSESSMENT — PAIN DESCRIPTION - DESCRIPTORS
DESCRIPTORS: ACHING

## 2024-10-04 ASSESSMENT — PAIN DESCRIPTION - LOCATION
LOCATION: ABDOMEN

## 2024-10-04 ASSESSMENT — PAIN DESCRIPTION - ORIENTATION
ORIENTATION: ANTERIOR

## 2024-10-04 ASSESSMENT — PAIN - FUNCTIONAL ASSESSMENT: PAIN_FUNCTIONAL_ASSESSMENT: 0-10

## 2024-10-04 ASSESSMENT — PAIN DESCRIPTION - PAIN TYPE
TYPE: SURGICAL PAIN
TYPE: SURGICAL PAIN

## 2024-10-04 NOTE — H&P
6/17/2023), [facility]  (prior to 6/17/2023)    Social Connection and Isolation Panel [NHANES]     Frequency of Communication with Friends and Family: More than three times a week     Frequency of Social Gatherings with Friends and Family: More than three times a week   Housing Stability: Unknown (10/11/2023)    Housing Stability Vital Sign     Unstable Housing in the Last Year: No      Prior to Admission medications    Medication Sig Start Date End Date Taking? Authorizing Provider   pregabalin (LYRICA) 75 MG capsule Take 1 capsule by mouth 2 times daily.    Nicole Johnson MD   budesonide (RINOCORT AQUA) 32 MCG/ACT nasal spray 2 sprays by Each Nostril route 2 times daily    Nicole Johnson MD   folic acid (FOLVITE) 400 MCG tablet Take 1 tablet by mouth daily    Nicole Johnson MD   Plecanatide (TRULANCE) 3 MG TABS Take 1 tablet by mouth daily    Nicole Johnson MD   docusate sodium (COLACE) 100 MG capsule Take 1 capsule by mouth 2 times daily    Nicole Johnson MD   dilTIAZem (CARDIZEM CD) 180 MG extended release capsule Take 1 capsule by mouth daily 3/27/24   Pawan Snow MD   Fexofenadine HCl (ALLEGRA ALLERGY PO) Take 1 tablet by mouth daily    Nicole Johnson MD   Multiple Vitamin (MULTIVITAMIN ADULT PO) Take 1 tablet by mouth daily    Nicole Johnson MD   losartan-hydroCHLOROthiazide (HYZAAR) 100-12.5 MG per tablet TAKE 1 TABLET BY MOUTH EVERY DAY  Patient not taking: Reported on 9/16/2024 6/21/23   Cherelle Arzate MD   DULoxetine (CYMBALTA) 60 MG extended release capsule TAKE 1 CAPSULE BY MOUTH EVERY DAY 6/21/23   Cherelle Arzate MD   aspirin-acetaminophen-caffeine (EXCEDRIN MIGRAINE) 250-250-65 MG per tablet 2 tablets as needed    Automatic Reconciliation, Ar   Biotin 10 MG CAPS Take 1 tablet by mouth daily    Automatic Reconciliation, Ar   vitamin D3 (CHOLECALCIFEROL) 125 MCG (5000 UT) TABS tablet Take 1 tablet by mouth daily    Automatic

## 2024-10-04 NOTE — ANESTHESIA POSTPROCEDURE EVALUATION
Department of Anesthesiology  Postprocedure Note    Patient: Denis Pitts  MRN: [id number]  YOB: 1970  Date of evaluation: 10/4/2024    Procedure Summary       Date: 10/04/24 Room / Location: [facility] MAIN OR [address] [place] [address] MAIN OR    Anesthesia Start: 0725 Anesthesia Stop: 0845    Procedure: ROBOTIC REMOVAL OF GASTRIC BAND AND PORT (Abdomen) Diagnosis:       Dyskinesia of esophagus      Other complications of gastric band procedure      (Dyskinesia of esophagus [K22.4])      (Other complications of gastric band procedure [K95.09])    Providers: Tereso Quijano MD Responsible Provider: Bahman Fatima DO    Anesthesia Type: General ASA Status: 3            Anesthesia Type: General    Marianela Phase I: Marianela Score: 8    Marianela Phase II:      Anesthesia Post Evaluation    Patient location during evaluation: PACU  Patient participation: complete - patient participated  Level of consciousness: awake  Pain score: 0  Airway patency: patent  Nausea & Vomiting: no nausea  Cardiovascular status: hemodynamically stable  Respiratory status: acceptable  Hydration status: euvolemic  Comments: Seen, no complaints   Pain management: adequate    No notable events documented.

## 2024-10-04 NOTE — OP NOTE
OPERATIVE NOTE    Date of Procedure: 10/4/2024     Preoperative Diagnosis: Dyskinesia of esophagus [K22.4]  Other complications of gastric band procedure [K95.09]  Postoperative Diagnosis: Same    Procedure: Procedure(s):  ROBOTIC REMOVAL OF GASTRIC BAND AND PORT    Surgeon: Tereso Quijano MD  Assistant(s): MARINO Ball - They were critically important in the exposure and key portions of the case including assisting in port extraction, accessing the abdomen, instrument exchange with the robot, and closure.  Surgical Staff: Circulator: Layla Dixon RN  Scrub Person First: Levon Elmore  Physician Assistant: Dianne Carrasco PA    Anesthesia: General   Indications: 54-year-old female with dysphagia and intolerance of gastric band and port  Findings: Gastric band and port as expected    Description of Operation: Denis Pitts was identified in the pre-operative holding area. Informed consent was obtained after a complete discussion of risks, benefits and alternatives to surgery were had with the patient.    The patient was brought back to the operating room and placed under general endotracheal anesthesia on the operating room table in supine position. The patient was then prepped and draped in the usual sterile fashion. A timeout was performed.      We began by injected local over the previous port site. We used a 15 blade to incise the skin. We used electrocautery to get down to the port site. Using a Kocher we elevated the port up and cut the various permanent sutures to release the port from the fascia. The tubing was cut. The port was passed off the field. We removed all residual sutures.     We then used a 5mm trocar to enter the abdomen in the right mid abdomen. Next, we placed an 8 mm trocar cephalad to the umbilicus and another 8 mm trocar in the left mid abdomen and another 8 mm trocar in the left lateral abdomen. Next, I up-sized to an 8 mm trocar in the right mid abdomen. YORDAN Chaidez

## 2024-10-04 NOTE — DISCHARGE INSTRUCTIONS
Discharge Instructions for General Surgery Patients       Do not lift any objects weighing more than 20 pounds for 2 weeks.    Do not do any housework including vacuuming, scrubbing or yardwork for 4 weeks.    Do not drive or operate machinery while taking sedating or narcotic medications.     Post operative pain is expected. Try to wean off narcotics as soon as able and take tylenol or NSAIDS. Do not take tylenol with Norco or Percocet as this may harm your liver. No NSAIDS if you are bariatric surgery patient.    You may walk as desired and go up and down stairs as needed. Walking is encouraged at least every hour to prevent blood clots.    You may shower the day after surgery. Do not take tub baths, swim or use hot tubs for 2 weeks. Pat dry wounds after with a towel.    REMOVE BAND-AIDS SUNDAY. LEAVE TAPE ON WOUND AFTER AND CAN SHOWER SUNDAY.    Regular diet. Take Miralax once or twice a day as needed for constipation.    Urinary retention can occur after surgery. If you are not able to void for over 8 hours after surgery please contact our office or go to the emergency room for further evaluation.    Follow up with provider as scheduled.    If you experience fever (greater than 101.5), chills, vomiting or redness or drainage at surgical site, please contact your surgeon’s office.    If you have further questions or concerns, please call your surgeon’s office at [phone].      ______________________________________________________________________    Anesthesia Discharge Instructions    After general anesthesia or intervenous sedation, for 24 hours or while taking prescription Narcotics:  Limit your activities  Do not drive or operate hazardous machinery  If you have not urinated within 8 hours after discharge, please contact your surgeon on call.  Do not make important personal or business decisions  Do not drink alcoholic beverages    Report the following to your surgeon:  Excessive pain, swelling, redness  or odor of or around the surgical area  Temperature over 100.5 degrees  Nausea and vomiting lasting longer than 4 hours or if unable to take medication  Any signs of decreased circulation or nerve impairment to extremity:  Change in color, persistent numbness, tingling, coldness or increased pain.  Any questions

## 2024-10-04 NOTE — ANESTHESIA PRE PROCEDURE
mcg by mouth daily    Automatic Reconciliation, Ar   desonide (DESOWEN) 0.05 % cream APPLY A SMALL AMOUNT TO AFFECTED AREA TWICE A DAY AS NEEDED 4/21/22   Automatic Reconciliation, Ar   Propylene Glycol 0.6 % SOLN Apply 1 drop to eye 3 times daily as needed    Automatic Reconciliation, Ar       Current medications:    Current Facility-Administered Medications   Medication Dose Route Frequency Provider Last Rate Last Admin   • sodium chloride flush 0.9 % injection 5-40 mL  5-40 mL IntraVENous 2 times per day Tereso Quijano MD       • sodium chloride flush 0.9 % injection 5-40 mL  5-40 mL IntraVENous PRN Tereso Quijano MD       • 0.9 % sodium chloride infusion   IntraVENous PRN Tereso Quijano MD       • ceFAZolin Sodium (ANCEF) 3,000 mg in sodium chloride 0.9 % 100 mL (mini-bag)  3,000 mg IntraVENous On Call to OR Tereso Quijano MD       • fentaNYL (SUBLIMAZE) injection 100 mcg  100 mcg IntraVENous Once PRN Bahman Fatima, DO       • ondansetron (ZOFRAN) injection 4 mg  4 mg IntraVENous Once Bahman Fatima, DO       • 0.9 % sodium chloride infusion   IntraVENous Continuous Bahman Fatima, DO       • lactated ringers IV soln infusion   IntraVENous Continuous Bahman Fatima, DO       • sodium chloride flush 0.9 % injection 5-40 mL  5-40 mL IntraVENous 2 times per day Bahman Fatima, DO       • sodium chloride flush 0.9 % injection 5-40 mL  5-40 mL IntraVENous PRN Bahman Fatima, DO       • 0.9 % sodium chloride infusion   IntraVENous PRN Bahman Fatima, DO       • midazolam PF (VERSED) injection 5 mg  5 mg IntraVENous Once PRN Bahman Fatima, DO       • fentaNYL (SUBLIMAZE) injection 100 mcg  100 mcg IntraVENous Once PRN Bahman Fatima, DO       • ondansetron (ZOFRAN) injection 4 mg  4 mg IntraVENous Once Bahman Fatima, DO       • 0.9 % sodium chloride infusion   IntraVENous Continuous Bahman Fatima, DO       • lactated ringers IV soln infusion   IntraVENous Continuous Akua,

## 2024-10-07 ENCOUNTER — TELEPHONE (OUTPATIENT)
Age: 54
End: 2024-10-07

## 2024-10-07 NOTE — TELEPHONE ENCOUNTER
----- Message from Alysa ZHANG sent at 10/7/2024  8:20 AM EDT -----  Regarding: Discharge Phone Call  Hello,    Please call Ms. Pitts for her discharge phone call. She will also need a 3 week call as well.     Thank you!  Alysa

## 2024-10-07 NOTE — TELEPHONE ENCOUNTER
Bariatric Post Op Call 48 hour    Hydration: Less than 32 ounces of water daily is fair to poor (Goal is 64 ounces per day)  Poor [] Fair []  Good [x] Great []    Amount: 67 oz    Comment:     Ambulation:( walking throughout the day, at least every 2 hours while awake. Patient should be up and out of bed most of the day.)   Poor [] Fair [x]  Good [] Great []    Comment: not walking as much as requested.  States she has SCD's at home that she use    Urine Color: Question of any odor and color (should be brandon, pale, and clear)   Dark [] Brandon [] Pale [x] Clear []    Comment:    Diet: Question any nausea and/or vomiting.            Protein intake (ultimately goal is 60 grams of protein daily, but at 2 days post op they should be working towards this and may not be at goal yet)  Poor [] Fair [x]  Good [] Great []    Comment:       Bowel movements: Question of any constipation- haven't had any bowel movements for more than 3 days. This could be related to protein intake and/or narcotic pain medication usage.     Comment:no bowel movement since discharged started back taking IBS medication Trulance to help with constipation         Use of incentive spirometer:  Yes []  No [x]    Comment:     Incision: (No redness, pain, swelling or fever)     Healing Well [] Redness [] Pain [x] Drainage [] Swelling []    Comment: light bleeding from 2 of her incisions abdominal/incisional pain with position change and walking    Pain: Right sided incisional (usually the largest incision with deep stitch) abdominal pain is normal (should be less than 3).  Pain 0 - 10: resting 2/10 position change or walking 10/10    Comment (are they taking pain medication and is it helping? Abdominal support / splinting/ ice or heat?): wearing compression shorts and added Extra strength excedrine  States tylenol does not help like candy.  Asked to try ice pack, states she just put one in the freezer to use  Referred to provider: Sadie Puente NP

## 2024-10-14 ENCOUNTER — OFFICE VISIT (OUTPATIENT)
Age: 54
End: 2024-10-14
Payer: COMMERCIAL

## 2024-10-14 VITALS
WEIGHT: 293 LBS | HEART RATE: 85 BPM | BODY MASS INDEX: 47.49 KG/M2 | SYSTOLIC BLOOD PRESSURE: 131 MMHG | RESPIRATION RATE: 20 BRPM | TEMPERATURE: 98 F | DIASTOLIC BLOOD PRESSURE: 78 MMHG | OXYGEN SATURATION: 98 %

## 2024-10-14 DIAGNOSIS — C50.919 TRIPLE NEGATIVE BREAST CANCER (HCC): Primary | ICD-10-CM

## 2024-10-14 DIAGNOSIS — Z17.421 TRIPLE NEGATIVE BREAST CANCER (HCC): Primary | ICD-10-CM

## 2024-10-14 DIAGNOSIS — Z85.3 HISTORY OF INVASIVE BREAST CANCER: ICD-10-CM

## 2024-10-14 DIAGNOSIS — Z98.890 HISTORY OF LUMPECTOMY OF LEFT BREAST: ICD-10-CM

## 2024-10-14 PROCEDURE — 3075F SYST BP GE 130 - 139MM HG: CPT | Performed by: INTERNAL MEDICINE

## 2024-10-14 PROCEDURE — 99213 OFFICE O/P EST LOW 20 MIN: CPT | Performed by: INTERNAL MEDICINE

## 2024-10-14 PROCEDURE — 3078F DIAST BP <80 MM HG: CPT | Performed by: INTERNAL MEDICINE

## 2024-10-14 NOTE — PROGRESS NOTES
Denis Pitts is a 54 y.o. female    Chief Complaint   Patient presents with    Follow-up     Triple Negative Left Breast Cancer       1. Have you been to the ER, urgent care clinic since your last visit?  Hospitalized since your last visit? Yes, [facility] [address]    2. Have you seen or consulted any other health care providers outside of the [facility] since your last visit?  Include any pap smears or colon screening. Yes, Dr. Hugo Schmitt-GI, Dr. Gerard Birmingham-GYN/URO, Dr. Sami Gutierrez - Arthritis specialist      Dr. Tereso Quijano - [facility] band  
09/16/2024 09:37 AM     09/16/2024 09:37 AM    CO2 29 09/16/2024 09:37 AM    BUN 7 09/16/2024 09:37 AM    GFRAA >60 02/03/2022 09:51 AM     Lab Results   Component Value Date/Time    ALT 16 10/10/2023 02:03 PM    GLOB 3.9 10/10/2023 11:23 AM     MRI Result (most recent):  MRI BREAST BILATERAL W WO CONTRAST 12/06/2022    Narrative  This is a summary report. The complete report is available in the patient's medical record. If you cannot access the medical record, please contact the sending organization for a detailed fax or copy.    EXAM:  MRI BREAST BI W WO CONT    INDICATION: Left breast carcinoma treated with left lumpectomy and radiation in  2016. Evaluate for recurrence or new neoplasm.    COMPARISON: MRI breast on 7/5/2016. Mammography on 1/13/2022 and 12/24/2020.    EXAM: MRI of right and left breast without and with IV contrast Gadolinium .    TECHNIQUE: MRI breast without and with IV contrast. Bilateral breast MRI was  performed using a dedicated breast coil without compression with the patient in  the prone position. Precontrast T1-weighted images with fat suppression were  obtained followed by bolus injection of 20 mL of ProHance  performed on a 3  Akua magnet. Postcontrast dynamic and high-resolution images were acquired.  Axial T2 fat-saturated imaging was also performed. The images were analyzed  using CAD analysis, enhancement curves, digital subtraction, and 2 and 3  dimensional reconstructions.    FINDINGS:    Background parenchymal enhancement: Mild on the right and minimal on the left.    Lymph nodes: No lymphadenopathy.    Right breast: There is no suspicious focus of morphology or temporal  enhancement. Foci are decreased Normal skin and nipple.    Left breast: There is no suspicious focus of morphology or temporal enhancement.  Foci are decreased. Lumpectomy scar is far posterior in the upper outer  quadrant. Mild associated fat necrosis. Left breast skin thickening

## 2024-10-18 ENCOUNTER — OFFICE VISIT (OUTPATIENT)
Age: 54
End: 2024-10-18

## 2024-10-18 VITALS
DIASTOLIC BLOOD PRESSURE: 81 MMHG | SYSTOLIC BLOOD PRESSURE: 139 MMHG | HEIGHT: 70 IN | HEART RATE: 83 BPM | OXYGEN SATURATION: 98 % | BODY MASS INDEX: 41.95 KG/M2 | WEIGHT: 293 LBS | RESPIRATION RATE: 18 BRPM | TEMPERATURE: 98.8 F

## 2024-10-18 DIAGNOSIS — Z09 POSTOP CHECK: Primary | ICD-10-CM

## 2024-10-18 PROCEDURE — 99024 POSTOP FOLLOW-UP VISIT: CPT | Performed by: NURSE PRACTITIONER

## 2024-10-18 ASSESSMENT — PATIENT HEALTH QUESTIONNAIRE - PHQ9
SUM OF ALL RESPONSES TO PHQ QUESTIONS 1-9: 0
SUM OF ALL RESPONSES TO PHQ QUESTIONS 1-9: 0
1. LITTLE INTEREST OR PLEASURE IN DOING THINGS: NOT AT ALL
2. FEELING DOWN, DEPRESSED OR HOPELESS: NOT AT ALL
SUM OF ALL RESPONSES TO PHQ QUESTIONS 1-9: 0
SUM OF ALL RESPONSES TO PHQ QUESTIONS 1-9: 0
SUM OF ALL RESPONSES TO PHQ9 QUESTIONS 1 & 2: 0

## 2024-10-18 NOTE — PROGRESS NOTES
Identified patient with two patient identifiers (name and ). Reviewed chart in preparation for visit and have obtained necessary documentation.    Denis Pitts is a 54 y.o. female  Chief Complaint   Patient presents with    Post-Op Check     2 weeks s/p ROBOTIC REMOVAL OF GASTRIC BAND AND PORT     /81 (Site: Right Upper Arm, Position: Sitting, Cuff Size: Large Adult)   Pulse 83   Temp 98.8 °F (37.1 °C) (Oral)   Resp 18   Ht 1.778 m (5' 10\")   Wt (!) 152.1 kg (335 lb 6.4 oz)   SpO2 98%   BMI 48.12 kg/m²     1. Have you been to the ER, urgent care clinic since your last visit?  Hospitalized since your last visit?no    2. Have you seen or consulted any other health care providers outside of the [facility] since your last visit?  Include any pap smears or colon screening. no

## 2024-10-18 NOTE — PROGRESS NOTES
Subjective:      Denis Pitts is a 54 y.o. female presents for postop care 2 weeks status post robotic gastric band and port removal.   Appetite is good. Eating a regular diet without difficulty.   Bowel movements are regular.  The patient is voiding without difficulty.  The patient is not having any pain..  Denies nausea, vomiting or GERD      Ms. Pitts has a reminder for a \"due or due soon\" health maintenance. I have asked that she contact her primary care provider for follow-up on this health maintenance.      Objective:     /81 (Site: Right Upper Arm, Position: Sitting, Cuff Size: Large Adult)   Pulse 83   Temp 98.8 °F (37.1 °C) (Oral)   Resp 18   Ht 1.778 m (5' 10\")   Wt (!) 152.1 kg (335 lb 6.4 oz)   SpO2 98%   BMI 48.12 kg/m²     General:  alert, cooperative   Abdomen: soft, non-tender   Incision:   healing well, no drainage, no erythema, no dehiscence, incision well approximated     Assessment:     Doing well postoperatively.    Plan:   Follow up  in 2 weeks VV  May increase activity as tolerated  No lifting greater than 20 lbs x1 week then may increase by 10 lbs each week thereafter  May get incision wet.   Will schedule EGD for December to evaluate esophagus.     OBINNA Schwartz - HAROLDO

## 2024-10-21 ENCOUNTER — PREP FOR PROCEDURE (OUTPATIENT)
Age: 54
End: 2024-10-21

## 2024-10-21 ENCOUNTER — TELEPHONE (OUTPATIENT)
Age: 54
End: 2024-10-21

## 2024-10-21 NOTE — TELEPHONE ENCOUNTER
Spoke to patient to schedule her EGD with Dr Quijano at [facility].  I offered 12/2 @ 8:30am with arrival time of 7am and she accepted.    I let the patient know they are required to bring someone with them that will be responsible to take them home along with their photo ID and insurance card.      If you are taking any weight lose medication, you need to stop one week before procedure                            Trulicity (dulaglutide)                          Wegovy (Semaglutide)                          Ozempic (Semaglutide)                          Rybelsus (tirzepatide)                          Mounjaro (tirzepatide)                           Zepbound (tirzepatide)        I let them know once this is scheduled I will put the information in a letter along with where they need to go and pre-procedure instructions.   This letter will post to their my chart and go out in the mail.  She acknowledged thank you

## 2024-10-31 ENCOUNTER — TELEMEDICINE (OUTPATIENT)
Age: 54
End: 2024-10-31

## 2024-10-31 VITALS — WEIGHT: 293 LBS | BODY MASS INDEX: 41.95 KG/M2 | HEIGHT: 70 IN

## 2024-10-31 DIAGNOSIS — Z09 POSTOP CHECK: Primary | ICD-10-CM

## 2024-10-31 PROCEDURE — 99024 POSTOP FOLLOW-UP VISIT: CPT | Performed by: NURSE PRACTITIONER

## 2024-10-31 ASSESSMENT — PATIENT HEALTH QUESTIONNAIRE - PHQ9
SUM OF ALL RESPONSES TO PHQ9 QUESTIONS 1 & 2: 0
1. LITTLE INTEREST OR PLEASURE IN DOING THINGS: NOT AT ALL
SUM OF ALL RESPONSES TO PHQ QUESTIONS 1-9: 0
2. FEELING DOWN, DEPRESSED OR HOPELESS: NOT AT ALL
SUM OF ALL RESPONSES TO PHQ QUESTIONS 1-9: 0

## 2024-10-31 ASSESSMENT — PAIN DESCRIPTION - LOCATION: LOCATION: GENERALIZED

## 2024-10-31 ASSESSMENT — PAIN SCALES - GENERAL: PAINLEVEL_OUTOF10: 6

## 2024-10-31 NOTE — PROGRESS NOTES
Increase terazosin to 3 mg HS.  Continue to monitor BP.  Call if generally > 130/80.   Subjective:      Denis Pitts is a 54 y.o. female presents for postop care 4 weeks status post lap band removal and port removal.   Appetite is good. Eating a regular diet without difficulty.   She is having some GERD.      Ms. Pitts has a reminder for a \"due or due soon\" health maintenance. I have asked that she contact her primary care provider for follow-up on this health maintenance.      Objective:     Ht 1.778 m (5' 10\")   Wt (!) 152 kg (335 lb) Comment: Patient thinks this may be her current weight.  BMI 48.07 kg/m²     General:  alert, cooperative       Incision:   healing well, per patient.      Assessment:     Doing well postoperatively.    Plan:     Follow up in December for EGD.   May increase activity as tolerated.   May take Prilosec OTC for reflux as needed    Denis Pitts, was evaluated through a synchronous (real-time) audio-video encounter. The patient (or guardian if applicable) is aware that this is a billable service, which includes applicable co-pays. This Virtual Visit was conducted with patient's (and/or legal guardian's) consent. Patient identification was verified, and a caregiver was present when appropriate.   The patient was located at Home: [address] [place] [id number]  Provider was located at Facility (Appt Dept): [address] [facility] [address] [address]  Confirm you are appropriately licensed, registered, or certified to deliver care in the state where the patient is located as indicated above. If you are not or unsure, please re-schedule the visit: Yes, I confirm.       --OBINNA Schwartz NP on 10/31/2024 at 12:20 PM    An electronic signature was used to authenticate this note.     OBINNA Schwartz NP

## 2024-10-31 NOTE — PROGRESS NOTES
Identified patient with two patient identifiers (name and ). Reviewed chart in preparation for visit and have obtained necessary documentation.    eDnis Pitts is a 54 y.o. female  Chief Complaint   Patient presents with    Follow-up     There were no vitals taken for this visit.    1. Have you been to the ER, urgent care clinic since your last visit?  Hospitalized since your last visit?no    2. Have you seen or consulted any other health care providers outside of the [facility] since your last visit?  Include any pap smears or colon screening. No    Patient reported currently being out of her BP medication. Provider left the practice but has an upcoming appointment.    abdominal pain

## 2024-11-05 SDOH — HEALTH STABILITY: PHYSICAL HEALTH: ON AVERAGE, HOW MANY MINUTES DO YOU ENGAGE IN EXERCISE AT THIS LEVEL?: 0 MIN

## 2024-11-05 SDOH — HEALTH STABILITY: PHYSICAL HEALTH: ON AVERAGE, HOW MANY DAYS PER WEEK DO YOU ENGAGE IN MODERATE TO STRENUOUS EXERCISE (LIKE A BRISK WALK)?: 0 DAYS

## 2024-11-07 ENCOUNTER — TELEPHONE (OUTPATIENT)
Age: 54
End: 2024-11-07

## 2024-11-07 ENCOUNTER — OFFICE VISIT (OUTPATIENT)
Age: 54
End: 2024-11-07

## 2024-11-07 VITALS
HEIGHT: 70 IN | SYSTOLIC BLOOD PRESSURE: 139 MMHG | OXYGEN SATURATION: 97 % | DIASTOLIC BLOOD PRESSURE: 88 MMHG | RESPIRATION RATE: 16 BRPM | BODY MASS INDEX: 41.95 KG/M2 | TEMPERATURE: 98.5 F | HEART RATE: 86 BPM | WEIGHT: 293 LBS

## 2024-11-07 DIAGNOSIS — Z85.3 PERSONAL HISTORY OF MALIGNANT NEOPLASM OF BREAST: ICD-10-CM

## 2024-11-07 DIAGNOSIS — E66.813 CLASS 3 SEVERE OBESITY DUE TO EXCESS CALORIES WITH SERIOUS COMORBIDITY AND BODY MASS INDEX (BMI) OF 45.0 TO 49.9 IN ADULT: Primary | ICD-10-CM

## 2024-11-07 DIAGNOSIS — I10 ESSENTIAL (PRIMARY) HYPERTENSION: ICD-10-CM

## 2024-11-07 DIAGNOSIS — K58.9 IRRITABLE BOWEL SYNDROME, UNSPECIFIED TYPE: ICD-10-CM

## 2024-11-07 DIAGNOSIS — Z23 ENCOUNTER FOR IMMUNIZATION: ICD-10-CM

## 2024-11-07 DIAGNOSIS — E66.01 CLASS 3 SEVERE OBESITY DUE TO EXCESS CALORIES WITH SERIOUS COMORBIDITY AND BODY MASS INDEX (BMI) OF 45.0 TO 49.9 IN ADULT: Primary | ICD-10-CM

## 2024-11-07 DIAGNOSIS — M79.7 FIBROMYALGIA: ICD-10-CM

## 2024-11-07 RX ORDER — DILTIAZEM HYDROCHLORIDE 180 MG/1
180 CAPSULE, COATED, EXTENDED RELEASE ORAL DAILY
Qty: 90 CAPSULE | Refills: 3 | Status: SHIPPED | OUTPATIENT
Start: 2024-11-07

## 2024-11-07 RX ORDER — TIRZEPATIDE 2.5 MG/.5ML
2.5 INJECTION, SOLUTION SUBCUTANEOUS WEEKLY
Qty: 2 ML | Refills: 0 | Status: SHIPPED | OUTPATIENT
Start: 2024-11-07

## 2024-11-07 SDOH — ECONOMIC STABILITY: FOOD INSECURITY: WITHIN THE PAST 12 MONTHS, THE FOOD YOU BOUGHT JUST DIDN'T LAST AND YOU DIDN'T HAVE MONEY TO GET MORE.: NEVER TRUE

## 2024-11-07 SDOH — ECONOMIC STABILITY: INCOME INSECURITY: HOW HARD IS IT FOR YOU TO PAY FOR THE VERY BASICS LIKE FOOD, HOUSING, MEDICAL CARE, AND HEATING?: NOT HARD AT ALL

## 2024-11-07 SDOH — ECONOMIC STABILITY: FOOD INSECURITY: WITHIN THE PAST 12 MONTHS, YOU WORRIED THAT YOUR FOOD WOULD RUN OUT BEFORE YOU GOT MONEY TO BUY MORE.: NEVER TRUE

## 2024-11-07 NOTE — TELEPHONE ENCOUNTER
Attempted PA for pt's Zepbound.  Unable to submit because drug is not covered by pt's plan.    FYI sent to PCP.

## 2024-11-07 NOTE — PROGRESS NOTES
Verified name and birth date for privacy precautions.   Chart reviewed in preparation for today's visit.     Chief Complaint   Patient presents with    New Patient          Health Maintenance Due   Topic    HIV screen     Hepatitis B vaccine (1 of 3 - 19+ 3-dose series)    DTaP/Tdap/Td vaccine (2 - Td or Tdap)    Flu vaccine (1)    COVID-19 Vaccine (7 - 2023-24 season)         Wt Readings from Last 3 Encounters:   11/07/24 (!) 153.3 kg (338 lb)   10/31/24 (!) 152 kg (335 lb)   10/18/24 (!) 152.1 kg (335 lb 6.4 oz)     Temp Readings from Last 3 Encounters:   11/07/24 98.5 °F (36.9 °C)   10/18/24 98.8 °F (37.1 °C) (Oral)   10/14/24 98 °F (36.7 °C)     BP Readings from Last 3 Encounters:   11/07/24 139/88   10/18/24 139/81   10/14/24 131/78     Pulse Readings from Last 3 Encounters:   11/07/24 86   10/18/24 83   10/14/24 85       Social Determinants of Health     Tobacco Use: Low Risk  (11/7/2024)    Patient History     Smoking Tobacco Use: Never     Smokeless Tobacco Use: Never     Passive Exposure: Not on file   Alcohol Use: Not At Risk (8/28/2023)    AUDIT-C     Frequency of Alcohol Consumption: Never     Average Number of Drinks: Patient does not drink     Frequency of Binge Drinking: Never   Financial Resource Strain: Low Risk  (11/7/2024)    Overall Financial Resource Strain (CARDIA)     Difficulty of Paying Living Expenses: Not hard at all   Food Insecurity: No Food Insecurity (11/7/2024)    Hunger Vital Sign     Worried About Running Out of Food in the Last Year: Never true     Ran Out of Food in the Last Year: Never true   Transportation Needs: Unknown (11/7/2024)    PRAPARE - Transportation     Lack of Transportation (Medical): Not on file     Lack of Transportation (Non-Medical): No   Physical Activity: Inactive (11/5/2024)    Exercise Vital Sign     Days of Exercise per Week: 0 days     Minutes of Exercise per Session: 0 min   Stress: Not on file   Social Connections: Unknown (10/21/2021)    Received from

## 2024-11-07 NOTE — PROGRESS NOTES
Denis Pitts is a 54 y.o. year old female who is a new patient to me today. She was previously followed by Dr Arzate.      Assessment & Plan:   1. Class 3 severe obesity due to excess calories with serious comorbidity and body mass index (BMI) of 45.0 to 49.9 in adult  Assessment & Plan:  We discussed options for weight loss, and will start Zepbound. She is not sure if her insurance covers this - will send to pharmacy and see if we can submit a prior authorization  Orders:  -     tirzepatide-weight management (ZEPBOUND) 2.5 MG/0.5ML SOAJ subCUTAneous auto-injector pen; Inject 2.5 mg into the skin once a week, Disp-2 mL, R-0Normal  2. Essential (primary) hypertension  Assessment & Plan:   Chronic, at goal (stable), continue current medications  Orders:  -     dilTIAZem (CARDIZEM CD) 180 MG extended release capsule; Take 1 capsule by mouth daily, Disp-90 capsule, R-3Normal  3. Encounter for immunization  -     Tdap, BOOSTRIX, (age 10 yrs+), IM  -     Influenza, FLUCELVAX Trivalent, (age 6 mo+) IM, Preservative Free, 0.5mL  4. Irritable bowel syndrome, unspecified type  Assessment & Plan:  Chronic, stable, continue with management per GI  5. Fibromyalgia  Assessment & Plan:  Managed by rheumatology  6. Personal history of malignant neoplasm of breast  Assessment & Plan:   Monitored by specialist- no acute findings meriting change in the plan. Continue follow up with Dr Lui.        Return in about 3 months (around 2/7/2025) for Medication Followup.    History/Subjective   Breast Cancer  Dx June 2016 in [place]  Follows with Dr Lui    Migraines  Less frequent than previous  Follows with neurology, Dr Aviles    IBSc  Dr Jaden Carter daily, previously on Linzess    Fibromyalgia   Arthritis  Cymbalta 60mg, Lyrica  Dr Gutierrez    Lymphedema  Follows with lymphedema clinic    Hx of thalassemia    Weight management  She says she would like to discuss weight management. She finds it difficult to be as active as she

## 2024-11-12 PROBLEM — E66.813 CLASS 3 SEVERE OBESITY DUE TO EXCESS CALORIES WITH SERIOUS COMORBIDITY AND BODY MASS INDEX (BMI) OF 45.0 TO 49.9 IN ADULT: Status: ACTIVE | Noted: 2024-11-12

## 2024-11-12 PROBLEM — N95.0 POSTMENOPAUSAL BLEEDING: Status: RESOLVED | Noted: 2022-02-10 | Resolved: 2024-11-12

## 2024-11-12 PROBLEM — N95.0 PMB (POSTMENOPAUSAL BLEEDING): Status: RESOLVED | Noted: 2021-10-26 | Resolved: 2024-11-12

## 2024-11-12 PROBLEM — Z78.0 POSTMENOPAUSAL: Status: RESOLVED | Noted: 2024-03-17 | Resolved: 2024-11-12

## 2024-11-12 PROBLEM — Z86.2 HISTORY OF ANEMIA: Status: RESOLVED | Noted: 2024-03-17 | Resolved: 2024-11-12

## 2024-11-12 PROBLEM — Z85.3 HISTORY OF LEFT BREAST CANCER: Status: RESOLVED | Noted: 2024-03-17 | Resolved: 2024-11-12

## 2024-11-12 PROBLEM — E66.01 CLASS 3 SEVERE OBESITY DUE TO EXCESS CALORIES WITH SERIOUS COMORBIDITY AND BODY MASS INDEX (BMI) OF 45.0 TO 49.9 IN ADULT: Status: ACTIVE | Noted: 2024-11-12

## 2024-11-12 PROBLEM — Z87.19 HISTORY OF DIVERTICULOSIS: Status: RESOLVED | Noted: 2024-03-17 | Resolved: 2024-11-12

## 2024-11-12 PROBLEM — Z98.890 HISTORY OF LUMPECTOMY OF LEFT BREAST: Status: RESOLVED | Noted: 2024-03-17 | Resolved: 2024-11-12

## 2024-11-12 PROBLEM — M48.00 SPINAL STENOSIS: Status: RESOLVED | Noted: 2024-03-17 | Resolved: 2024-11-12

## 2024-11-12 ASSESSMENT — ENCOUNTER SYMPTOMS
VOMITING: 0
SHORTNESS OF BREATH: 0
NAUSEA: 0
BLOOD IN STOOL: 0
COUGH: 0
DIARRHEA: 0
CONSTIPATION: 0
ABDOMINAL PAIN: 0

## 2024-11-12 NOTE — ASSESSMENT & PLAN NOTE
Monitored by specialist- no acute findings meriting change in the plan. Continue follow up with Dr Lui.

## 2024-11-12 NOTE — ASSESSMENT & PLAN NOTE
We discussed options for weight loss, and will start Zepbound. She is not sure if her insurance covers this - will send to pharmacy and see if we can submit a prior authorization

## 2024-11-26 RX ORDER — CEPHALEXIN 500 MG/1
500 CAPSULE ORAL 2 TIMES DAILY
COMMUNITY

## 2024-11-26 RX ORDER — NITROFURANTOIN 25; 75 MG/1; MG/1
CAPSULE ORAL
COMMUNITY

## 2024-11-26 RX ORDER — FEXOFENADINE HCL 180 MG/1
180 TABLET ORAL DAILY
COMMUNITY

## 2024-11-26 RX ORDER — MAGNESIUM 200 MG
1000 TABLET ORAL DAILY
COMMUNITY

## 2024-11-26 NOTE — FLOWSHEET NOTE
[facility]  Endoscopy Preprocedure Instructions      1. On the day of your surgery, please report to registration located on the 2nd floor of the  the Main Hospital. yes    2. You must have a responsible adult to drive you to the hospital, stay at the hospital during your procedure and drive you home. If they leave your procedure will not be started (no exceptions). yes    3. Do not have anything to eat or drink (including water, gum, mints, coffee, and juice) after midnight. This does not apply to the medications you were instructed to take by your physician.yes  If you are currently taking Plavix, Coumadin, Aspirin, or other blood-thinning agents, contact your physician for special instructions. Not applicable    4. If you are having a procedure that requires bowel prep: We recommend that you have only clear liquids the day before your procedure and begin your bowel prep by 5:00 pm.  You may continue to drink clear liquids until midnight.  If for any reason you are not able to complete your prep please notify your physician immediately. not applicable    5. Have a list of all current medications, including vitamins, herbal supplements and any other over the counter medications. Reviewed over the phone    6. If you wear glasses, contacts, dentures and/or hearing aids, they may be removed prior to procedure, please bring a case to store them in. yes    7. You should understand that if you do not follow these instructions your procedure may be cancelled.  If your physical condition changes (I.e. fever, cold or flu) please contact your doctor as soon as possible.    8. It is important that you be on time.  If for any reason you are unable to keep your appointment please call [phone] the day of or your physician’s office prior to your scheduled procedure    9. Have you received your COVID Vaccine? yes If no, you will need to receive a COVID test/swab here at [facility] [place] Monday

## 2024-12-02 ENCOUNTER — HOSPITAL ENCOUNTER (OUTPATIENT)
Facility: HOSPITAL | Age: 54
Setting detail: OUTPATIENT SURGERY
Discharge: HOME | End: 2024-12-02
Attending: SURGERY | Admitting: SURGERY
Payer: COMMERCIAL

## 2024-12-02 ENCOUNTER — ANESTHESIA (OUTPATIENT)
Facility: HOSPITAL | Age: 54
End: 2024-12-02
Payer: COMMERCIAL

## 2024-12-02 ENCOUNTER — ANESTHESIA EVENT (OUTPATIENT)
Facility: HOSPITAL | Age: 54
End: 2024-12-02
Payer: COMMERCIAL

## 2024-12-02 VITALS
BODY MASS INDEX: 41.95 KG/M2 | OXYGEN SATURATION: 99 % | DIASTOLIC BLOOD PRESSURE: 82 MMHG | SYSTOLIC BLOOD PRESSURE: 136 MMHG | RESPIRATION RATE: 17 BRPM | HEIGHT: 70 IN | HEART RATE: 77 BPM | WEIGHT: 293 LBS | TEMPERATURE: 97.4 F

## 2024-12-02 PROCEDURE — 7100000011 HC PHASE II RECOVERY - ADDTL 15 MIN: Performed by: SURGERY

## 2024-12-02 PROCEDURE — 3700000000 HC ANESTHESIA ATTENDED CARE: Performed by: SURGERY

## 2024-12-02 PROCEDURE — 6360000002 HC RX W HCPCS: Performed by: NURSE ANESTHETIST, CERTIFIED REGISTERED

## 2024-12-02 PROCEDURE — 7100000010 HC PHASE II RECOVERY - FIRST 15 MIN: Performed by: SURGERY

## 2024-12-02 PROCEDURE — 2500000003 HC RX 250 WO HCPCS: Performed by: NURSE ANESTHETIST, CERTIFIED REGISTERED

## 2024-12-02 PROCEDURE — 88305 TISSUE EXAM BY PATHOLOGIST: CPT

## 2024-12-02 PROCEDURE — 2709999900 HC NON-CHARGEABLE SUPPLY: Performed by: SURGERY

## 2024-12-02 PROCEDURE — 3700000001 HC ADD 15 MINUTES (ANESTHESIA): Performed by: SURGERY

## 2024-12-02 PROCEDURE — 88312 SPECIAL STAINS GROUP 1: CPT

## 2024-12-02 PROCEDURE — 3600007502: Performed by: SURGERY

## 2024-12-02 PROCEDURE — 43239 EGD BIOPSY SINGLE/MULTIPLE: CPT | Performed by: SURGERY

## 2024-12-02 PROCEDURE — 3600007512: Performed by: SURGERY

## 2024-12-02 RX ORDER — PROPOFOL 10 MG/ML
INJECTION, EMULSION INTRAVENOUS
Status: DISCONTINUED | OUTPATIENT
Start: 2024-12-02 | End: 2024-12-02 | Stop reason: SDUPTHER

## 2024-12-02 RX ORDER — GLYCOPYRROLATE 0.2 MG/ML
INJECTION INTRAMUSCULAR; INTRAVENOUS
Status: DISCONTINUED | OUTPATIENT
Start: 2024-12-02 | End: 2024-12-02 | Stop reason: SDUPTHER

## 2024-12-02 RX ORDER — SODIUM CHLORIDE 0.9 % (FLUSH) 0.9 %
5-40 SYRINGE (ML) INJECTION EVERY 12 HOURS SCHEDULED
Status: DISCONTINUED | OUTPATIENT
Start: 2024-12-02 | End: 2024-12-02 | Stop reason: HOSPADM

## 2024-12-02 RX ORDER — SODIUM CHLORIDE 9 MG/ML
INJECTION, SOLUTION INTRAVENOUS PRN
Status: DISCONTINUED | OUTPATIENT
Start: 2024-12-02 | End: 2024-12-02 | Stop reason: HOSPADM

## 2024-12-02 RX ORDER — DEXMEDETOMIDINE HYDROCHLORIDE 100 UG/ML
INJECTION, SOLUTION INTRAVENOUS
Status: DISCONTINUED | OUTPATIENT
Start: 2024-12-02 | End: 2024-12-02 | Stop reason: SDUPTHER

## 2024-12-02 RX ORDER — OMEPRAZOLE 40 MG/1
40 CAPSULE, DELAYED RELEASE ORAL
Qty: 90 CAPSULE | Refills: 1 | Status: SHIPPED | OUTPATIENT
Start: 2024-12-02

## 2024-12-02 RX ORDER — PROPOFOL 10 MG/ML
INJECTION, EMULSION INTRAVENOUS
Status: DISCONTINUED | OUTPATIENT
Start: 2024-12-02 | End: 2024-12-02

## 2024-12-02 RX ORDER — SODIUM CHLORIDE 0.9 % (FLUSH) 0.9 %
5-40 SYRINGE (ML) INJECTION PRN
Status: DISCONTINUED | OUTPATIENT
Start: 2024-12-02 | End: 2024-12-02 | Stop reason: HOSPADM

## 2024-12-02 RX ADMIN — PROPOFOL 140 MCG/KG/MIN: 10 INJECTION, EMULSION INTRAVENOUS at 08:35

## 2024-12-02 RX ADMIN — PROPOFOL 50 MG: 10 INJECTION, EMULSION INTRAVENOUS at 08:34

## 2024-12-02 RX ADMIN — DEXMEDETOMIDINE 10 MCG: 100 INJECTION, SOLUTION INTRAVENOUS at 08:32

## 2024-12-02 RX ADMIN — GLYCOPYRROLATE 0.2 MG: 0.2 INJECTION INTRAMUSCULAR; INTRAVENOUS at 08:32

## 2024-12-02 ASSESSMENT — PAIN SCALES - GENERAL
PAINLEVEL_OUTOF10: 0
PAINLEVEL_OUTOF10: 0

## 2024-12-02 ASSESSMENT — PAIN - FUNCTIONAL ASSESSMENT: PAIN_FUNCTIONAL_ASSESSMENT: 0-10

## 2024-12-02 NOTE — ANESTHESIA POSTPROCEDURE EVALUATION
Department of Anesthesiology  Postprocedure Note    Patient: Denis Pitts  MRN: [id number]  YOB: 1970  Date of evaluation: 12/2/2024    Procedure Summary       Date: 12/02/24 Room / Location: [facility] 03 / [facility] ENDOSCOPY    Anesthesia Start: 0831 Anesthesia Stop: 0848    Procedures:       ESOPHAGOGASTRODUODENOSCOPY (Upper GI Region)      ESOPHAGOGASTRODUODENOSCOPY BIOPSY (Upper GI Region) Diagnosis:       Dyskinesia of esophagus      (Dyskinesia of esophagus [K22.4])    Surgeons: Tereso Quijano MD Responsible Provider: Ailin Santana MD    Anesthesia Type: MAC ASA Status: 3            Anesthesia Type: No value filed.    Marianela Phase I: Marianela Score: 10    Marianela Phase II: Marianela Score: 10    Anesthesia Post Evaluation    Patient location during evaluation: PACU  Patient participation: complete - patient participated  Level of consciousness: awake  Airway patency: patent  Nausea & Vomiting: no vomiting and no nausea  Cardiovascular status: hemodynamically stable  Respiratory status: acceptable  Hydration status: stable  Pain management: adequate    No notable events documented.

## 2024-12-02 NOTE — DISCHARGE INSTRUCTIONS
Discharge Instructions for Endoscopy Patients       Diagnosis: Gastritis      Plan: Omeprazole daily      Do not drive or operate machinery while taking sedating or narcotic medications.     Some discomfort is expected in your throat or upper abdomen. Take tylenol as needed.    If an acid monitoring device was deployed, please follow the instructions for recording and returning the device.    You may walk as desired and go up and down stairs as needed. Walking is encouraged.    You may shower like normal    You may resume regular diet.    Follow up with provider as scheduled.    If you experience fever (greater than 101.5), chills, vomiting or redness or drainage at surgical site, please contact your surgeon’s office.    If you have further questions or concerns, please call your surgeon’s office at [phone].

## 2024-12-02 NOTE — ANESTHESIA PRE PROCEDURE
History:    Social History     Tobacco Use   • Smoking status: Never   • Smokeless tobacco: Never   Substance Use Topics   • Alcohol use: Yes     Comment: maybe once a yr                                Counseling given: Not Answered      Vital Signs (Current):   There were no vitals filed for this visit.                                             BP Readings from Last 3 Encounters:   11/07/24 139/88   10/18/24 139/81   10/14/24 131/78       NPO Status:                                                                                 BMI:   Wt Readings from Last 3 Encounters:   11/07/24 (!) 153.3 kg (338 lb)   10/31/24 (!) 152 kg (335 lb)   10/18/24 (!) 152.1 kg (335 lb 6.4 oz)     There is no height or weight on file to calculate BMI.    CBC:   Lab Results   Component Value Date/Time    WBC 7.9 09/16/2024 09:37 AM    RBC 4.16 09/16/2024 09:37 AM    HGB 11.0 09/16/2024 09:37 AM    HCT 35.0 09/16/2024 09:37 AM    MCV 84.1 09/16/2024 09:37 AM    RDW 14.8 09/16/2024 09:37 AM     09/16/2024 09:37 AM       CMP:   Lab Results   Component Value Date/Time     09/16/2024 09:37 AM    K 3.3 09/16/2024 09:37 AM     09/16/2024 09:37 AM    CO2 29 09/16/2024 09:37 AM    BUN 7 09/16/2024 09:37 AM    CREATININE 0.74 09/16/2024 09:37 AM    GFRAA >60 02/03/2022 09:51 AM    AGRATIO 1.5 10/10/2023 02:03 PM    LABGLOM >90 09/16/2024 09:37 AM    LABGLOM 99 10/10/2023 02:03 PM    LABGLOM >60 10/10/2023 11:23 AM    GLUCOSE 104 09/16/2024 09:37 AM    GLUCOSE 105 10/10/2023 02:03 PM    CALCIUM 9.1 09/16/2024 09:37 AM    BILITOT 0.5 10/10/2023 02:03 PM    ALKPHOS 153 10/10/2023 02:03 PM    ALKPHOS 146 10/10/2023 11:23 AM    AST 23 10/10/2023 02:03 PM    ALT 16 10/10/2023 02:03 PM       POC Tests: No results for input(s): \"POCGLU\", \"POCNA\", \"POCK\", \"POCCL\", \"POCBUN\", \"POCHEMO\", \"POCHCT\" in the last 72 hours.    Coags: No results found for: \"PROTIME\", \"INR\", \"APTT\"    HCG (If Applicable): No results found for: \"PREGTESTUR\",

## 2024-12-02 NOTE — OP NOTE
[facility]   Endoscopic Procedure Note        NAME:  Denis Pitts   :   1970   MRN:   [id number]     Date/Time:  2024 8:49 AM    Esophagogastroduodenoscopy (EGD) Procedure Note    Preoperative Diagnosis: GERD  Postoperative Diagnosis: GERD       Surgeon:  Tereso Quijano MD    Staff: Circulator: Lucia Ibarra RN     Implants: None    Anethesia/Sedation:  MAC anesthesia Propofol    Procedure Details     After infom consent was obtained for the procedure, with all risks and benefits of procedure explained the patient was taken to the endoscopy suite and placed in the left lateral decubitus position.  Following sequential administration of sedation as per above, the GIF-H190 gastroscope was inserted into the mouth and advanced under direct vision to duodenal bulb.  A careful inspection was made as the gastroscope was withdrawn, including a retroflexed view of the proximal stomach; findings and interventions are described below.      Findings:  Esophagus: Normal, GEJ at 41 cm, no hiatal hernia  Stomach: Antral gastritis and small ulcers, Hill Grade 2 valve  Duodenum/jejunum: Normal       Therapies: None    Specimens: Cold bx GEJ and antrum for H pylori instant           EBL: Minimal    Complications:   None; patient tolerated the procedure well.           Impression:    See Postoperative diagnosis above    Recommendations:  Start PPI daily  Follow up H pylor results    Discharge disposition:  Home in the company of  when able to ambulate    Tereso Quijano MD, [facility], [facility]  Bariatric and General Surgeon  [facility]

## 2024-12-02 NOTE — H&P
macrocrystal-monohydrate, (MACROBID) 100 MG capsule Take by mouth Will start 11- after finishing cephalexin.   Yes Nicole Johnson MD   Polyethyl Glycol-Propyl Glycol (SYSTANE ULTRA OP) Apply to eye 1 drop each eye twice a day. In individual vials.   Yes Nicole Johnson MD   Aspirin-Acetaminophen-Caffeine (EXCEDRIN EXTRA STRENGTH PO) Take by mouth as needed   Yes Nicole Johnson MD   dilTIAZem (CARDIZEM CD) 180 MG extended release capsule Take 1 capsule by mouth daily 11/7/24  Yes Jerad Solomon DO   pregabalin (LYRICA) 75 MG capsule Take 1 capsule by mouth 2 times daily.   Yes Nicole Johnson MD   budesonide (RINOCORT AQUA) 32 MCG/ACT nasal spray 2 sprays by Each Nostril route 2 times daily   Yes Nicole Johnson MD   folic acid (FOLVITE) 400 MCG tablet Take 1 tablet by mouth daily   Yes Nicole Johnson MD   Plecanatide (TRULANCE) 3 MG TABS Take 1 tablet by mouth daily   Yes Nicole Johnson MD   Multiple Vitamin (MULTIVITAMIN ADULT PO) Take 1 tablet by mouth daily   Yes Nicole Johnson MD   DULoxetine (CYMBALTA) 60 MG extended release capsule TAKE 1 CAPSULE BY MOUTH EVERY DAY 6/21/23  Yes Cherelle Arzate MD   tirzepatide-weight management (ZEPBOUND) 2.5 MG/0.5ML SOAJ subCUTAneous auto-injector pen Inject 2.5 mg into the skin once a week 11/7/24   Jerad Solomon DO     Allergies   Allergen Reactions    Adhesive Tape Other (See Comments)     Tears skin off. Severe.    Dermabond Rash and Other (See Comments)     SKIN PEELING, BURNING    Oxycodone Itching    Gabapentin Other (See Comments)    Lisinopril Other (See Comments)     \"constant tickle in my throat\"  \"constant tickle in my throat\"    Molds & Smuts Other (See Comments)     NASAL CONGESTION    Other      Other reaction(s): Sneezing  PT STATES SHE IS ALLERGIC TO HER SWEAT WHICH CAUSES REDNESS AND ITCHING AND SWELLING    Sulfa Antibiotics Hives and Other (See Comments)    Sulfamethoxazole-Trimethoprim

## 2024-12-02 NOTE — PROGRESS NOTES
Volunteer, took patients mom out to  in wheelchair,  was ronan to take mom to get her car. Patient educated.

## 2024-12-06 ENCOUNTER — TELEPHONE (OUTPATIENT)
Age: 54
End: 2024-12-06

## 2024-12-06 NOTE — TELEPHONE ENCOUNTER
Patient stated her throat is still kind of hurting from the EGD and is wondering what she can take to help.

## 2024-12-06 NOTE — TELEPHONE ENCOUNTER
Patient identified with two patient identifiers. Patient states she has throat soreness.  She is using chloraseptic  spray.  Patient has no current c/o fever or cough.  She has been experiencing nasal congestion. Patient instructed to try lozenges, warm liquids, warm salt water gargles, and or tylenol if needed.  Patient expressed understanding will return call if any other questions or concerns.

## 2024-12-09 ENCOUNTER — PATIENT MESSAGE (OUTPATIENT)
Age: 54
End: 2024-12-09

## 2024-12-10 ENCOUNTER — OFFICE VISIT (OUTPATIENT)
Age: 54
End: 2024-12-10
Payer: COMMERCIAL

## 2024-12-10 VITALS
SYSTOLIC BLOOD PRESSURE: 144 MMHG | RESPIRATION RATE: 15 BRPM | HEIGHT: 70 IN | BODY MASS INDEX: 41.95 KG/M2 | WEIGHT: 293 LBS | HEART RATE: 96 BPM | DIASTOLIC BLOOD PRESSURE: 95 MMHG | TEMPERATURE: 97.7 F | OXYGEN SATURATION: 95 %

## 2024-12-10 VITALS
HEIGHT: 70 IN | SYSTOLIC BLOOD PRESSURE: 162 MMHG | WEIGHT: 293 LBS | BODY MASS INDEX: 41.95 KG/M2 | TEMPERATURE: 98.5 F | DIASTOLIC BLOOD PRESSURE: 92 MMHG | RESPIRATION RATE: 19 BRPM | OXYGEN SATURATION: 97 % | HEART RATE: 90 BPM

## 2024-12-10 DIAGNOSIS — K21.00 GASTROESOPHAGEAL REFLUX DISEASE WITH ESOPHAGITIS WITHOUT HEMORRHAGE: ICD-10-CM

## 2024-12-10 DIAGNOSIS — R09.81 SINUS CONGESTION: Primary | ICD-10-CM

## 2024-12-10 DIAGNOSIS — E66.01 MORBID OBESITY: ICD-10-CM

## 2024-12-10 DIAGNOSIS — R09.82 POST-NASAL DRIP: Primary | ICD-10-CM

## 2024-12-10 PROCEDURE — 3077F SYST BP >= 140 MM HG: CPT | Performed by: SURGERY

## 2024-12-10 PROCEDURE — 99213 OFFICE O/P EST LOW 20 MIN: CPT | Performed by: SURGERY

## 2024-12-10 PROCEDURE — 3080F DIAST BP >= 90 MM HG: CPT | Performed by: NURSE PRACTITIONER

## 2024-12-10 PROCEDURE — 3080F DIAST BP >= 90 MM HG: CPT | Performed by: SURGERY

## 2024-12-10 PROCEDURE — 3077F SYST BP >= 140 MM HG: CPT | Performed by: NURSE PRACTITIONER

## 2024-12-10 PROCEDURE — 99213 OFFICE O/P EST LOW 20 MIN: CPT | Performed by: NURSE PRACTITIONER

## 2024-12-10 ASSESSMENT — ANXIETY QUESTIONNAIRES
3. WORRYING TOO MUCH ABOUT DIFFERENT THINGS: NOT AT ALL
GAD7 TOTAL SCORE: 0
4. TROUBLE RELAXING: NOT AT ALL
2. NOT BEING ABLE TO STOP OR CONTROL WORRYING: NOT AT ALL
1. FEELING NERVOUS, ANXIOUS, OR ON EDGE: NOT AT ALL
5. BEING SO RESTLESS THAT IT IS HARD TO SIT STILL: NOT AT ALL
7. FEELING AFRAID AS IF SOMETHING AWFUL MIGHT HAPPEN: NOT AT ALL
IF YOU CHECKED OFF ANY PROBLEMS ON THIS QUESTIONNAIRE, HOW DIFFICULT HAVE THESE PROBLEMS MADE IT FOR YOU TO DO YOUR WORK, TAKE CARE OF THINGS AT HOME, OR GET ALONG WITH OTHER PEOPLE: NOT DIFFICULT AT ALL
6. BECOMING EASILY ANNOYED OR IRRITABLE: NOT AT ALL

## 2024-12-10 ASSESSMENT — PATIENT HEALTH QUESTIONNAIRE - PHQ9
SUM OF ALL RESPONSES TO PHQ QUESTIONS 1-9: 0
2. FEELING DOWN, DEPRESSED OR HOPELESS: NOT AT ALL
SUM OF ALL RESPONSES TO PHQ QUESTIONS 1-9: 0
SUM OF ALL RESPONSES TO PHQ QUESTIONS 1-9: 0
1. LITTLE INTEREST OR PLEASURE IN DOING THINGS: NOT AT ALL
SUM OF ALL RESPONSES TO PHQ QUESTIONS 1-9: 0
SUM OF ALL RESPONSES TO PHQ9 QUESTIONS 1 & 2: 0

## 2024-12-10 ASSESSMENT — ENCOUNTER SYMPTOMS: SINUS PRESSURE: 1

## 2024-12-10 NOTE — PROGRESS NOTES
Surgery Progress Note    12/10/2024    CC: Sinus infection    Subjective:     Patient following up from her recent EGD.  Pathology showed esophagitis.  Responding well to PPI therapy.  She reports symptoms being gone.  Now has a sinus infection she thinks.  Seeing her PCP later on today.    Constitutional: No fever or chills  Neurologic: No headache  Eyes: No scleral icterus or irritated eyes  Nose: Postnasal drainage  Mouth: No oral lesions or sore throat  Cardiac: No palpations or chest pain  Pulmonary: No cough or shortness of breath  Gastrointestinal: GERD, no nausea, emesis, diarrhea, or constipation  Genitourinary: No dysuria  Musculoskeletal: No muscle or joint tenderness  Skin: No rashes or lesions  Psychiatric: No anxiety or depressed mood    Objective:     Vitals:    12/10/24 1011   BP: (!) 162/92   Pulse:    Resp:    Temp:    SpO2:      Body mass index is 48.7 kg/m².  Wt 339 lbs    General: No acute distress, conversant  Eyes: PERRLA, no scleral icterus  HENT: Normocephalic without oral lesions  Neck: Trachea midline without LAD  Cardiac: Normal pulse rate and rhythm  Pulmonary: Symmetric chest rise with normal effort  GI: Soft, NT, ND, no hernia, no splenomegaly  Skin: Warm without rash  Extremities: No edema or joint stiffness  Psych: Appropriate mood and affect    Assessment:     54-year-old female with a history of gastric band status post removal with persistent morbid obesity and GERD as well as possible sinus infection    Plan:     Patient is following up with her PCP later today.  They will decide on antibiotics or not.    PCP working on getting Zepbound approved.  This is a fine step to take.  Continue PPI daily for chronic GERD.  If medications does not succeed or cannot get approval I would have her consider a gastric bypass to address her weight and her GERD.    Can follow-up as needed with me.      Tereso Quijano MD, FACS, [facility]  Bariatric and General Surgeon  [facility]

## 2024-12-10 NOTE — PROGRESS NOTES
Denis Pitts (:  1970) is a 54 y.o. female,here for evaluation of the following chief complaint(s):  Cold Symptoms    BP (!) 144/95   Pulse 96   Temp 97.7 °F (36.5 °C)   Resp 15   Ht 1.778 m (5' 10\")   Wt (!) 154.1 kg (339 lb 12.8 oz)   SpO2 95%   BMI 48.76 kg/m²       SUBJECTIVE/OBJECTIVE:    HPI:Patient reports nosebleed started 6 days ago. It progressed to yellow and bloody sinus drainage. No sinus pain today. No fever. She did 3 days of Afrin. She also used budesonide and nasal saline mist. Symptoms have slightly improved.    Review of Systems   HENT:  Positive for congestion and sinus pressure.        Physical Exam  Constitutional:       Appearance: Normal appearance.   HENT:      Head: Normocephalic.      Nose: Congestion present.      Right Turbinates: Swollen.      Left Turbinates: Swollen.      Mouth/Throat:      Lips: Pink.      Mouth: Mucous membranes are moist.   Cardiovascular:      Rate and Rhythm: Normal rate and regular rhythm.   Pulmonary:      Effort: Pulmonary effort is normal.      Breath sounds: Normal breath sounds.   Skin:     General: Skin is warm and dry.   Neurological:      General: No focal deficit present.      Mental Status: She is alert and oriented to person, place, and time.   Psychiatric:         Mood and Affect: Mood normal.         Behavior: Behavior normal.          ASSESSMENT/PLAN:  1. Sinus congestion    Nasal saline  Nasal steroid spray  Consider aquaphor on inside of nose  Humidifier  Consider antibiotic if no improvement over the next week  --OBINNA López - NP

## 2024-12-10 NOTE — PROGRESS NOTES
Identified pt with two pt identifiers (name and ). Reviewed chart in preparation for visit and have obtained necessary documentation.    Denis Pitts is a 54 y.o. female Follow-up (Follow up from EGD 2024  /C/O NASAL DRAINAGE/)  .    Vitals:    12/10/24 1006 12/10/24 1011   BP: (!) 161/96 (!) 162/92   Site: Left Upper Arm Right Lower Arm   Position: Sitting Sitting   Cuff Size: Large Adult Large Adult   Pulse: 90    Resp: 19    Temp: 98.5 °F (36.9 °C)    TempSrc: Oral    SpO2: 97%    Weight: (!) 154 kg (339 lb 6.4 oz)    Height: 1.778 m (5' 10\")       Advised patient to follow up with PCP for elevated BP    1. Have you been to the ER, urgent care clinic since your last visit?  Hospitalized since your last visit?  no     2. Have you seen or consulted any other health care providers outside of the [facility] since your last visit?  Include any pap smears or colon screening.  no

## 2025-01-29 ENCOUNTER — HOSPITAL ENCOUNTER (OUTPATIENT)
Facility: HOSPITAL | Age: 55
Discharge: HOME | End: 2025-02-01
Payer: COMMERCIAL

## 2025-01-29 VITALS — HEIGHT: 70 IN | BODY MASS INDEX: 41.95 KG/M2 | WEIGHT: 293 LBS

## 2025-01-29 DIAGNOSIS — Z98.890 HISTORY OF LUMPECTOMY OF LEFT BREAST: ICD-10-CM

## 2025-01-29 DIAGNOSIS — Z12.31 BREAST CANCER SCREENING BY MAMMOGRAM: ICD-10-CM

## 2025-01-29 DIAGNOSIS — Z85.3 HISTORY OF LEFT BREAST CANCER: Primary | ICD-10-CM

## 2025-01-29 DIAGNOSIS — I89.0 LYMPHEDEMA: ICD-10-CM

## 2025-01-29 PROCEDURE — 77063 BREAST TOMOSYNTHESIS BI: CPT

## 2025-02-05 SDOH — ECONOMIC STABILITY: FOOD INSECURITY: WITHIN THE PAST 12 MONTHS, THE FOOD YOU BOUGHT JUST DIDN'T LAST AND YOU DIDN'T HAVE MONEY TO GET MORE.: NEVER TRUE

## 2025-02-05 SDOH — ECONOMIC STABILITY: TRANSPORTATION INSECURITY
IN THE PAST 12 MONTHS, HAS THE LACK OF TRANSPORTATION KEPT YOU FROM MEDICAL APPOINTMENTS OR FROM GETTING MEDICATIONS?: NO

## 2025-02-05 SDOH — ECONOMIC STABILITY: FOOD INSECURITY: WITHIN THE PAST 12 MONTHS, YOU WORRIED THAT YOUR FOOD WOULD RUN OUT BEFORE YOU GOT MONEY TO BUY MORE.: NEVER TRUE

## 2025-02-05 SDOH — ECONOMIC STABILITY: INCOME INSECURITY: IN THE LAST 12 MONTHS, WAS THERE A TIME WHEN YOU WERE NOT ABLE TO PAY THE MORTGAGE OR RENT ON TIME?: YES

## 2025-02-07 ENCOUNTER — OFFICE VISIT (OUTPATIENT)
Age: 55
End: 2025-02-07
Payer: COMMERCIAL

## 2025-02-07 VITALS
OXYGEN SATURATION: 97 % | SYSTOLIC BLOOD PRESSURE: 142 MMHG | TEMPERATURE: 98.4 F | HEART RATE: 82 BPM | HEIGHT: 70 IN | RESPIRATION RATE: 16 BRPM | DIASTOLIC BLOOD PRESSURE: 85 MMHG | BODY MASS INDEX: 41.95 KG/M2 | WEIGHT: 293 LBS

## 2025-02-07 DIAGNOSIS — R73.03 PRE-DIABETES: ICD-10-CM

## 2025-02-07 DIAGNOSIS — F51.01 PRIMARY INSOMNIA: ICD-10-CM

## 2025-02-07 DIAGNOSIS — R73.03 PRE-DIABETES: Primary | ICD-10-CM

## 2025-02-07 PROCEDURE — 3079F DIAST BP 80-89 MM HG: CPT | Performed by: STUDENT IN AN ORGANIZED HEALTH CARE EDUCATION/TRAINING PROGRAM

## 2025-02-07 PROCEDURE — 3077F SYST BP >= 140 MM HG: CPT | Performed by: STUDENT IN AN ORGANIZED HEALTH CARE EDUCATION/TRAINING PROGRAM

## 2025-02-07 PROCEDURE — 99214 OFFICE O/P EST MOD 30 MIN: CPT | Performed by: STUDENT IN AN ORGANIZED HEALTH CARE EDUCATION/TRAINING PROGRAM

## 2025-02-07 RX ORDER — TRAZODONE HYDROCHLORIDE 50 MG/1
50 TABLET, FILM COATED ORAL NIGHTLY
Qty: 90 TABLET | Refills: 1 | Status: SHIPPED | OUTPATIENT
Start: 2025-02-07

## 2025-02-07 SDOH — ECONOMIC STABILITY: FOOD INSECURITY: WITHIN THE PAST 12 MONTHS, YOU WORRIED THAT YOUR FOOD WOULD RUN OUT BEFORE YOU GOT MONEY TO BUY MORE.: NEVER TRUE

## 2025-02-07 SDOH — ECONOMIC STABILITY: FOOD INSECURITY: WITHIN THE PAST 12 MONTHS, THE FOOD YOU BOUGHT JUST DIDN'T LAST AND YOU DIDN'T HAVE MONEY TO GET MORE.: NEVER TRUE

## 2025-02-07 ASSESSMENT — PATIENT HEALTH QUESTIONNAIRE - PHQ9
SUM OF ALL RESPONSES TO PHQ9 QUESTIONS 1 & 2: 0
SUM OF ALL RESPONSES TO PHQ QUESTIONS 1-9: 0
1. LITTLE INTEREST OR PLEASURE IN DOING THINGS: NOT AT ALL
SUM OF ALL RESPONSES TO PHQ QUESTIONS 1-9: 0
SUM OF ALL RESPONSES TO PHQ QUESTIONS 1-9: 0
2. FEELING DOWN, DEPRESSED OR HOPELESS: NOT AT ALL
SUM OF ALL RESPONSES TO PHQ QUESTIONS 1-9: 0

## 2025-02-07 NOTE — PROGRESS NOTES
Denis Pitts is a 54 y.o. year old female who presents today (02/07/25) for follow-up/ 3 Month Follow-Up      Assessment & Plan:     Assessment & Plan  1. Obesity   - GLP-1 was not covered by her insurance for weight loss  - Update labs to monitor A1c today, if in DM range will see if Ozempic or Mounjaro are covered for that diagnosis  - She will also reach out to her insurance and see if Wegovy or Zepbound are covered since the new year    2. Insomnia.  - Start trazodone 50mg and melatonin 5mg for sleep    3. Fibromyalgia.  - Continues to experience joint pains despite Cymbalta and Lyrica  - Continue current medications, hopefully will also be able to work on some weight loss which should help some of her joint pain    4. Hot flashes.  - Advised to discuss with Dr. Iyer about possible treatments.    5. HTN  - Above goal today, reports she forgot to take her BP medication this morning  - Continue to monitor, will not change regimen based on today's reading      Return in about 3 months (around 5/7/2025) for Followup.      Subjective:     History of Present Illness  The patient presents for evaluation of weight management, insomnia, hot flashes, fibromyalgia, and joint pain.    Weight Management  Unfortunately her insurance did not cover Wegovy or Zepbound for weight loss. She has not inquired whether her plan has changed to include these medications this year    Fibromyalgia and Joint Pain  Recently consulted an orthopedic specialist for a fibromyalgia flare-up that triggered a migraine. Uses Excedrin Extra Strength more frequently due to increased joint pain, finding it more effective than Tylenol. Uncertain about the healing status of her ulcers and takes Protonix first thing in the morning before eating.    Insomnia and Hot Flashes  Experiencing insomnia for the past eight years, particularly severe recently, with only two hours of sleep last night and none the previous night. Has not taken any sleep aids

## 2025-02-07 NOTE — PATIENT INSTRUCTIONS
Start melatonin 5mg and trazodone 50mg for sleep. Take the melatonin about 1-2 hours before bedtime and the trazodone about 30 minutes before.     Before your next appointment with Dr Solomon, reach out to Dr Lui and ask her opinion about whether hormone replacement therapy is an option for your menopause symptoms.     Ask Dr Quijano if it is okay to start Aleve for your arthritis pain with your recent diagnosis of ulcers.

## 2025-02-15 LAB
ALBUMIN SERPL-MCNC: 4.2 G/DL (ref 3.8–4.9)
ALP SERPL-CCNC: 200 IU/L (ref 44–121)
ALT SERPL-CCNC: 16 IU/L (ref 0–32)
AST SERPL-CCNC: 21 IU/L (ref 0–40)
BILIRUB SERPL-MCNC: 0.3 MG/DL (ref 0–1.2)
BUN SERPL-MCNC: 12 MG/DL (ref 6–24)
BUN/CREAT SERPL: 20 (ref 9–23)
CALCIUM SERPL-MCNC: 9.2 MG/DL (ref 8.7–10.2)
CHLORIDE SERPL-SCNC: 103 MMOL/L (ref 96–106)
CO2 SERPL-SCNC: 25 MMOL/L (ref 20–29)
CREAT SERPL-MCNC: 0.59 MG/DL (ref 0.57–1)
EGFRCR SERPLBLD CKD-EPI 2021: 108 ML/MIN/1.73
GLOBULIN SER CALC-MCNC: 2.9 G/DL (ref 1.5–4.5)
GLUCOSE SERPL-MCNC: 105 MG/DL (ref 70–99)
HBA1C MFR BLD: 6.3 % (ref 4.8–5.6)
POTASSIUM SERPL-SCNC: 4.3 MMOL/L (ref 3.5–5.2)
PROT SERPL-MCNC: 7.1 G/DL (ref 6–8.5)
SODIUM SERPL-SCNC: 142 MMOL/L (ref 134–144)

## 2025-02-25 ENCOUNTER — HOSPITAL ENCOUNTER (OUTPATIENT)
Facility: HOSPITAL | Age: 55
Setting detail: RECURRING SERIES
Discharge: HOME | End: 2025-02-28
Payer: COMMERCIAL

## 2025-02-25 VITALS — WEIGHT: 293 LBS | BODY MASS INDEX: 41.95 KG/M2 | HEIGHT: 70 IN

## 2025-02-25 PROCEDURE — 97162 PT EVAL MOD COMPLEX 30 MIN: CPT

## 2025-02-25 PROCEDURE — 97760 ORTHOTIC MGMT&TRAING 1ST ENC: CPT

## 2025-02-25 NOTE — THERAPY EVALUATION
history of reconstructive surgery on the right leg to correct pez planus. Patient is motivated to improve her condition and is in need of new upper and lower extremity compression products as well as compression bra. Current garments are due for replacement.   Patient will benefit from complete decongestive therapy (CDT) including: Manual lymphatic drainage (MLD) technique, soft tissue mobilizations, Short-stretch textile bandages/compression system to decongest limb and Kinesiotaping as appropriate.     Evaluation Complexity:  History:  HIGH Complexity :3+ comorbidities / personal factors will impact the outcome/ POC ; Examination:  HIGH Complexity : 4+ Standardized tests and measures addressing body structure, function, activity limitation and / or participation in recreation  ;Presentation:  MEDIUM Complexity : Evolving with changing characteristics  ;Clinical Decision Making:  MEDIUM Complexity : FOTO score of 26-74 Overall Complexity Rating: MEDIUM  Problem List: pain affecting function and edema affection function   Treatment Plan may include any combination of the followin Therapeutic Exercise, 96642 Manual Therapy, 50715 Self Care/Home Management, 62920 Vasopneumatic Device  (Vasopnuematic compression justification:  Per bilateral girth measures taken and listed above the edema is considered significant and having an impact on the patient's self care and ADL's), 97580 Orthotic Management and Training, and 13581 Orthotic Management and Training, Subsequent  Patient / Family readiness to learn indicated by: asking questions, interest, and return verbalization   Persons(s) to be included in education: patient (P)  Barriers to Learning/Limitations: none  Measures taken if barriers to learning present: not applicable  Patient Self Reported Health Status: fair  Rehabilitation Potential: good    Short Term Goals: To be accomplished in 6 treatments    1. Patient will be measured for and obtain comfortable

## 2025-03-07 ENCOUNTER — HOSPITAL ENCOUNTER (OUTPATIENT)
Facility: HOSPITAL | Age: 55
Discharge: HOME | End: 2025-03-10
Attending: INTERNAL MEDICINE
Payer: COMMERCIAL

## 2025-03-07 DIAGNOSIS — Z85.3 HISTORY OF INVASIVE BREAST CANCER: ICD-10-CM

## 2025-03-07 DIAGNOSIS — Z98.890 HISTORY OF LUMPECTOMY OF LEFT BREAST: ICD-10-CM

## 2025-03-07 PROCEDURE — C8908 MRI W/O FOL W/CONT, BREAST,: HCPCS

## 2025-03-07 PROCEDURE — 2580000003 HC RX 258: Performed by: INTERNAL MEDICINE

## 2025-03-07 PROCEDURE — 6360000004 HC RX CONTRAST MEDICATION: Performed by: INTERNAL MEDICINE

## 2025-03-07 PROCEDURE — A9579 GAD-BASE MR CONTRAST NOS,1ML: HCPCS | Performed by: INTERNAL MEDICINE

## 2025-03-07 RX ORDER — 0.9 % SODIUM CHLORIDE 0.9 %
100 INTRAVENOUS SOLUTION INTRAVENOUS ONCE
Status: COMPLETED | OUTPATIENT
Start: 2025-03-07 | End: 2025-03-07

## 2025-03-07 RX ORDER — SODIUM CHLORIDE 0.9 % (FLUSH) 0.9 %
10 SYRINGE (ML) INJECTION ONCE
Status: DISCONTINUED | OUTPATIENT
Start: 2025-03-07 | End: 2025-03-11 | Stop reason: HOSPADM

## 2025-03-07 RX ADMIN — GADOTERIDOL 20 ML: 279.3 INJECTION, SOLUTION INTRAVENOUS at 11:16

## 2025-03-07 RX ADMIN — SODIUM CHLORIDE 100 ML: 9 INJECTION, SOLUTION INTRAVENOUS at 11:17

## 2025-03-12 ENCOUNTER — RESULTS FOLLOW-UP (OUTPATIENT)
Facility: HOSPITAL | Age: 55
End: 2025-03-12

## 2025-03-31 RX ORDER — OMEPRAZOLE 40 MG/1
40 CAPSULE, DELAYED RELEASE ORAL
Qty: 90 CAPSULE | Refills: 1 | Status: SHIPPED | OUTPATIENT
Start: 2025-03-31

## 2025-05-08 ENCOUNTER — OFFICE VISIT (OUTPATIENT)
Age: 55
End: 2025-05-08
Payer: COMMERCIAL

## 2025-05-08 VITALS
HEIGHT: 70 IN | OXYGEN SATURATION: 96 % | HEART RATE: 81 BPM | TEMPERATURE: 97.2 F | WEIGHT: 293 LBS | DIASTOLIC BLOOD PRESSURE: 85 MMHG | SYSTOLIC BLOOD PRESSURE: 149 MMHG | BODY MASS INDEX: 41.95 KG/M2 | RESPIRATION RATE: 16 BRPM

## 2025-05-08 DIAGNOSIS — R74.8 ELEVATED ALKALINE PHOSPHATASE LEVEL: ICD-10-CM

## 2025-05-08 DIAGNOSIS — R74.8 ELEVATED ALKALINE PHOSPHATASE LEVEL: Primary | ICD-10-CM

## 2025-05-08 DIAGNOSIS — E66.813 CLASS 3 SEVERE OBESITY DUE TO EXCESS CALORIES WITH SERIOUS COMORBIDITY AND BODY MASS INDEX (BMI) OF 45.0 TO 49.9 IN ADULT (HCC): ICD-10-CM

## 2025-05-08 PROCEDURE — 99214 OFFICE O/P EST MOD 30 MIN: CPT | Performed by: STUDENT IN AN ORGANIZED HEALTH CARE EDUCATION/TRAINING PROGRAM

## 2025-05-08 PROCEDURE — 3077F SYST BP >= 140 MM HG: CPT | Performed by: STUDENT IN AN ORGANIZED HEALTH CARE EDUCATION/TRAINING PROGRAM

## 2025-05-08 PROCEDURE — 3079F DIAST BP 80-89 MM HG: CPT | Performed by: STUDENT IN AN ORGANIZED HEALTH CARE EDUCATION/TRAINING PROGRAM

## 2025-05-08 NOTE — PROGRESS NOTES
Place 1,000 mcg under the tongue Daily   Yes Nicole Johnson MD   vitamin D (CHOLECALCIFEROL) 125 MCG (5000 UT) CAPS capsule Take 1 capsule by mouth daily   Yes Nicole Johnson MD   BIOTIN PO Take by mouth daily Will bring in strength.   Yes Nicole Johnson MD   Polyethyl Glycol-Propyl Glycol (SYSTANE ULTRA OP) Apply to eye 1 drop each eye twice a day. In individual vials.   Yes Nicole Johnson MD   dilTIAZem (CARDIZEM CD) 180 MG extended release capsule Take 1 capsule by mouth daily 11/7/24  Yes Jerad Solomon DO   pregabalin (LYRICA) 75 MG capsule Take 1 capsule by mouth 2 times daily.   Yes Nicole Johnson MD   budesonide (RINOCORT AQUA) 32 MCG/ACT nasal spray 2 sprays by Each Nostril route 2 times daily   Yes Nicole Johnson MD   folic acid (FOLVITE) 400 MCG tablet Take 1 tablet by mouth daily   Yes Nicole Johnson MD   Plecanatide (TRULANCE) 3 MG TABS Take 1 tablet by mouth daily   Yes Nicole Johnson MD   Multiple Vitamin (MULTIVITAMIN ADULT PO) Take 1 tablet by mouth daily   Yes Nicole Johnson MD   DULoxetine (CYMBALTA) 60 MG extended release capsule TAKE 1 CAPSULE BY MOUTH EVERY DAY 6/21/23  Yes Cherelle Arzate MD   Nasal Moisturizer Combination (RHINASE NA) by Nasal route as needed Lubricating nasal mist.  Patient not taking: Reported on 5/8/2025    Nicole Johnson MD       The following sections were reviewed & updated as appropriate: Problem List, Allergies, PMH, PSH, FH, and SH.      Objective:   BP (!) 149/85   Pulse 81   Temp 97.2 °F (36.2 °C) (Temporal)   Resp 16   Ht 1.778 m (5' 10\")   Wt (!) 155.1 kg (342 lb)   SpO2 96%   BMI 49.07 kg/m²     Physical Exam  Constitutional:       General: She is not in acute distress.     Appearance: Normal appearance.   Eyes:      General: No scleral icterus.     Conjunctiva/sclera: Conjunctivae normal.   Pulmonary:      Effort: Pulmonary effort is normal. No respiratory distress.   Skin:

## 2025-05-12 ENCOUNTER — PATIENT MESSAGE (OUTPATIENT)
Age: 55
End: 2025-05-12

## 2025-05-12 DIAGNOSIS — E66.813 CLASS 3 SEVERE OBESITY DUE TO EXCESS CALORIES WITH SERIOUS COMORBIDITY AND BODY MASS INDEX (BMI) OF 45.0 TO 49.9 IN ADULT (HCC): Primary | ICD-10-CM

## 2025-05-12 LAB
ALBUMIN SERPL-MCNC: 4.4 G/DL (ref 3.8–4.9)
ALP BONE SERPL-MCNC: 48.3 UG/L
ALP SERPL-CCNC: 212 IU/L (ref 44–121)
ALT SERPL-CCNC: 17 IU/L (ref 0–32)
AST SERPL-CCNC: 24 IU/L (ref 0–40)
BILIRUB DIRECT SERPL-MCNC: 0.15 MG/DL (ref 0–0.4)
BILIRUB SERPL-MCNC: 0.5 MG/DL (ref 0–1.2)
PROT SERPL-MCNC: 7.4 G/DL (ref 6–8.5)

## 2025-05-14 ENCOUNTER — RESULTS FOLLOW-UP (OUTPATIENT)
Age: 55
End: 2025-05-14

## 2025-05-16 ENCOUNTER — HOSPITAL ENCOUNTER (OUTPATIENT)
Facility: HOSPITAL | Age: 55
Discharge: HOME | End: 2025-05-19
Attending: STUDENT IN AN ORGANIZED HEALTH CARE EDUCATION/TRAINING PROGRAM
Payer: COMMERCIAL

## 2025-05-16 DIAGNOSIS — R74.8 ELEVATED ALKALINE PHOSPHATASE LEVEL: ICD-10-CM

## 2025-05-16 PROCEDURE — 76700 US EXAM ABDOM COMPLETE: CPT

## 2025-05-23 ENCOUNTER — RESULTS FOLLOW-UP (OUTPATIENT)
Age: 55
End: 2025-05-23

## 2025-06-19 ENCOUNTER — OFFICE VISIT (OUTPATIENT)
Age: 55
End: 2025-06-19
Payer: COMMERCIAL

## 2025-06-19 VITALS
BODY MASS INDEX: 41.95 KG/M2 | HEART RATE: 87 BPM | TEMPERATURE: 98.2 F | HEIGHT: 70 IN | RESPIRATION RATE: 18 BRPM | DIASTOLIC BLOOD PRESSURE: 89 MMHG | SYSTOLIC BLOOD PRESSURE: 144 MMHG | WEIGHT: 293 LBS | OXYGEN SATURATION: 97 %

## 2025-06-19 DIAGNOSIS — E55.9 VITAMIN D DEFICIENCY: ICD-10-CM

## 2025-06-19 DIAGNOSIS — R74.8 ELEVATED ALKALINE PHOSPHATASE LEVEL: ICD-10-CM

## 2025-06-19 DIAGNOSIS — R53.83 OTHER FATIGUE: ICD-10-CM

## 2025-06-19 DIAGNOSIS — M79.7 FIBROMYALGIA: ICD-10-CM

## 2025-06-19 DIAGNOSIS — D64.9 ANEMIA, UNSPECIFIED TYPE: ICD-10-CM

## 2025-06-19 DIAGNOSIS — R53.83 OTHER FATIGUE: Primary | ICD-10-CM

## 2025-06-19 PROCEDURE — 3079F DIAST BP 80-89 MM HG: CPT | Performed by: STUDENT IN AN ORGANIZED HEALTH CARE EDUCATION/TRAINING PROGRAM

## 2025-06-19 PROCEDURE — 3077F SYST BP >= 140 MM HG: CPT | Performed by: STUDENT IN AN ORGANIZED HEALTH CARE EDUCATION/TRAINING PROGRAM

## 2025-06-19 PROCEDURE — 99214 OFFICE O/P EST MOD 30 MIN: CPT | Performed by: STUDENT IN AN ORGANIZED HEALTH CARE EDUCATION/TRAINING PROGRAM

## 2025-06-19 NOTE — PROGRESS NOTES
Denis Pitts is a 54 y.o. year old female who presents today (06/26/25) for follow-up and to address acute concern of fatigue.    Assessment & Plan:   1. Other fatigue  -     Start with laboratory evaluation as below. If negative will plan to refer for sleep study to rule out AIME.   - CBC with Auto Differential; Future  -     TSH; Future  -     T4, Free; Future  -     Carissa-Barr virus VCA, IgM; Future  2. Vitamin D deficiency  -     Vitamin D 25 Hydroxy; Future  3. Anemia, unspecified type  -     Vitamin B12 & Folate; Future  -     Ferritin; Future  -     Iron and TIBC; Future  4. Elevated alkaline phosphatase level  Assessment & Plan:  Rule out autoimmune liver disease - ordered additional labs today. Referred to hepatology.   Orders:  -     Comprehensive Metabolic Panel; Future  -     ENRRIQUE by IFA w/Reflex; Future  -     Mitochondrial antibodies, M2; Future  -     Anti-smooth muscle antibody; Future  -     [facility]  5. Fibromyalgia  Assessment & Plan:  Continue management per rheumatology     Return in about 2 months (around 8/19/2025) for weight management followup.    Subjective:   ENRRIQUE, AMA, ASMA antibodies, refer to hepatology   History of Present Illness  The patient presents with fatigue and back pain.    Fatigue  She has experienced persistent fatigue for weeks, characterized by lack of energy upon waking and increased daytime naps. She reports episodes of feeling cold despite normal body temperature. She started a generic version of Zepbound yesterday; symptoms predate this medication.    No recent illness or medication changes. Ran out of folic acid over a month ago. Initiated on folate, B12, D3, and multivitamin regimen 10 years ago, with quarterly blood work. Previously required prescription D3 (5000 IU) for low vitamin D levels. She does not recall feeling this fatigued before. Describes herself as a light sleeper but has slept through alarms and

## 2025-06-20 LAB
25(OH)D3 SERPL-MCNC: 26.9 NG/ML (ref 30–100)
ALBUMIN SERPL-MCNC: 3.8 G/DL (ref 3.5–5)
ALBUMIN/GLOB SERPL: 1 (ref 1.1–2.2)
ALP SERPL-CCNC: 222 U/L (ref 45–117)
ALT SERPL-CCNC: 26 U/L (ref 12–78)
ANION GAP SERPL CALC-SCNC: 7 MMOL/L (ref 2–12)
AST SERPL-CCNC: 21 U/L (ref 15–37)
BASOPHILS # BLD: 0.04 K/UL (ref 0–0.1)
BASOPHILS NFR BLD: 0.4 % (ref 0–1)
BILIRUB SERPL-MCNC: 0.4 MG/DL (ref 0.2–1)
BUN SERPL-MCNC: 8 MG/DL (ref 6–20)
BUN/CREAT SERPL: 10 (ref 12–20)
CALCIUM SERPL-MCNC: 9.2 MG/DL (ref 8.5–10.1)
CHLORIDE SERPL-SCNC: 106 MMOL/L (ref 97–108)
CO2 SERPL-SCNC: 27 MMOL/L (ref 21–32)
CREAT SERPL-MCNC: 0.8 MG/DL (ref 0.55–1.02)
DIFFERENTIAL METHOD BLD: ABNORMAL
EOSINOPHIL # BLD: 0.07 K/UL (ref 0–0.4)
EOSINOPHIL NFR BLD: 0.7 % (ref 0–7)
ERYTHROCYTE [DISTWIDTH] IN BLOOD BY AUTOMATED COUNT: 14.5 % (ref 11.5–14.5)
FERRITIN SERPL-MCNC: 103 NG/ML (ref 26–388)
FOLATE SERPL-MCNC: 12.2 NG/ML (ref 5–21)
GLOBULIN SER CALC-MCNC: 4 G/DL (ref 2–4)
GLUCOSE SERPL-MCNC: 92 MG/DL (ref 65–100)
HCT VFR BLD AUTO: 35.2 % (ref 35–47)
HGB BLD-MCNC: 11.1 G/DL (ref 11.5–16)
IMM GRANULOCYTES # BLD AUTO: 0.03 K/UL (ref 0–0.04)
IMM GRANULOCYTES NFR BLD AUTO: 0.3 % (ref 0–0.5)
IRON SATN MFR SERPL: 19 % (ref 20–50)
IRON SERPL-MCNC: 47 UG/DL (ref 35–150)
LYMPHOCYTES # BLD: 2.49 K/UL (ref 0.8–3.5)
LYMPHOCYTES NFR BLD: 25.7 % (ref 12–49)
MCH RBC QN AUTO: 26.3 PG (ref 26–34)
MCHC RBC AUTO-ENTMCNC: 31.5 G/DL (ref 30–36.5)
MCV RBC AUTO: 83.4 FL (ref 80–99)
MONOCYTES # BLD: 0.69 K/UL (ref 0–1)
MONOCYTES NFR BLD: 7.1 % (ref 5–13)
NEUTS SEG # BLD: 6.36 K/UL (ref 1.8–8)
NEUTS SEG NFR BLD: 65.8 % (ref 32–75)
NRBC # BLD: 0 K/UL (ref 0–0.01)
NRBC BLD-RTO: 0 PER 100 WBC
PLATELET # BLD AUTO: 285 K/UL (ref 150–400)
PMV BLD AUTO: 10.8 FL (ref 8.9–12.9)
POTASSIUM SERPL-SCNC: 3.6 MMOL/L (ref 3.5–5.1)
PROT SERPL-MCNC: 7.8 G/DL (ref 6.4–8.2)
RBC # BLD AUTO: 4.22 M/UL (ref 3.8–5.2)
SODIUM SERPL-SCNC: 140 MMOL/L (ref 136–145)
T4 FREE SERPL-MCNC: 1 NG/DL (ref 0.8–1.5)
TIBC SERPL-MCNC: 243 UG/DL (ref 250–450)
TSH SERPL DL<=0.05 MIU/L-ACNC: 1.61 UIU/ML (ref 0.36–3.74)
VIT B12 SERPL-MCNC: 710 PG/ML (ref 193–986)
WBC # BLD AUTO: 9.7 K/UL (ref 3.6–11)

## 2025-06-21 LAB — EBV VCA IGM SER-ACNC: <36 U/ML (ref 0–35.9)

## 2025-06-22 LAB
MITOCHONDRIA M2 IGG SER-ACNC: <20 UNITS (ref 0–20)
SMA IGG SER-ACNC: 13 UNITS (ref 0–19)

## 2025-06-26 PROBLEM — R74.8 ELEVATED ALKALINE PHOSPHATASE LEVEL: Status: ACTIVE | Noted: 2025-06-26

## 2025-06-26 LAB
ANA TITR SER IF: POSITIVE
LABORATORY COMMENT REPORT: ABNORMAL

## 2025-06-30 ENCOUNTER — RESULTS FOLLOW-UP (OUTPATIENT)
Age: 55
End: 2025-06-30

## 2025-07-10 ENCOUNTER — CLINICAL DOCUMENTATION (OUTPATIENT)
Age: 55
End: 2025-07-10

## 2025-07-10 NOTE — PROGRESS NOTES
7/10/25 1515: New pt referral received. Referred by Jerad Solomon DO at [facility]. Dx: elevated alk phos and fatty liver. Routine appt. Records in EPIC

## 2025-08-13 ENCOUNTER — OFFICE VISIT (OUTPATIENT)
Age: 55
End: 2025-08-13
Payer: COMMERCIAL

## 2025-08-13 VITALS
BODY MASS INDEX: 41.95 KG/M2 | DIASTOLIC BLOOD PRESSURE: 86 MMHG | TEMPERATURE: 98.6 F | RESPIRATION RATE: 16 BRPM | SYSTOLIC BLOOD PRESSURE: 127 MMHG | HEART RATE: 74 BPM | HEIGHT: 70 IN | WEIGHT: 293 LBS | OXYGEN SATURATION: 96 %

## 2025-08-13 DIAGNOSIS — R53.83 OTHER FATIGUE: ICD-10-CM

## 2025-08-13 DIAGNOSIS — E66.813 CLASS 3 SEVERE OBESITY DUE TO EXCESS CALORIES WITH SERIOUS COMORBIDITY AND BODY MASS INDEX (BMI) OF 45.0 TO 49.9 IN ADULT (HCC): Primary | ICD-10-CM

## 2025-08-13 PROCEDURE — 3074F SYST BP LT 130 MM HG: CPT | Performed by: STUDENT IN AN ORGANIZED HEALTH CARE EDUCATION/TRAINING PROGRAM

## 2025-08-13 PROCEDURE — 99213 OFFICE O/P EST LOW 20 MIN: CPT | Performed by: STUDENT IN AN ORGANIZED HEALTH CARE EDUCATION/TRAINING PROGRAM

## 2025-08-13 PROCEDURE — 3079F DIAST BP 80-89 MM HG: CPT | Performed by: STUDENT IN AN ORGANIZED HEALTH CARE EDUCATION/TRAINING PROGRAM

## 2025-08-15 DIAGNOSIS — I89.0 LYMPHEDEMA: ICD-10-CM

## 2025-08-15 DIAGNOSIS — Z85.3 HISTORY OF LEFT BREAST CANCER: Primary | ICD-10-CM

## (undated) DEVICE — ENDO CARRY-ON PROCEDURE KIT INCLUDES ENZYMATIC SPONGE, GAUZE, BIOHAZARD LABEL, TRAY, LUBRICANT, DIRTY SCOPE LABEL, WATER LABEL, TRAY, DRAWSTRING PAD, AND DEFENDO 4-PIECE KIT.: Brand: ENDO CARRY-ON PROCEDURE KIT

## (undated) DEVICE — SYRINGE MED 10ML LUERLOCK TIP W/O SFTY DISP

## (undated) DEVICE — COVER LT HNDL PLAS RIG 1 PER PK

## (undated) DEVICE — BLUNTFILL: Brand: MONOJECT

## (undated) DEVICE — AGENT HEMSTAT W4XL4IN OXIDIZED REGENERATED CELOS ABSRB SFT

## (undated) DEVICE — VESSEL SEALER EXTEND: Brand: ENDOWRIST

## (undated) DEVICE — SUTURE VICRYL + SZ 2-0 L27IN ABSRB WHT SH 1/2 CIR TAPERCUT VCP417H

## (undated) DEVICE — GLOVE SURG SZ 7 L12IN FNGR THK79MIL GRN LTX FREE

## (undated) DEVICE — GOWN,SIRUS,NONRNF,SETINSLV,XL,20/CS: Brand: MEDLINE

## (undated) DEVICE — TROCARS: Brand: KII® BALLOON BLUNT TIP SYSTEM

## (undated) DEVICE — TIP COVER ACCESSORY

## (undated) DEVICE — DRAPE,LITHOTOMY,STERILE: Brand: MEDLINE

## (undated) DEVICE — GARMENT,MEDLINE,DVT,INT,CALF,MED, GEN2: Brand: MEDLINE

## (undated) DEVICE — SUTURE MONOCRYL + SZ 4-0 L27IN ABSRB UD L19MM PS-2 3/8 CIR MCP426H

## (undated) DEVICE — COVER,TABLE,60X90,STERILE: Brand: MEDLINE

## (undated) DEVICE — TOWEL SURG W17XL27IN STD BLU COT NONFENESTRATED PREWASHED

## (undated) DEVICE — VISIGI 3D®  CALIBRATION SYSTEM  SIZE 40FR STD W/ BULB: Brand: BOEHRINGER® VISIGI 3D™ SLEEVE GASTRECTOMY CALIBRATION SYSTEM, SIZE 40FR W/BULB

## (undated) DEVICE — KIT COLON W/ 1.1OZ LUB AND 2 END

## (undated) DEVICE — DEVICE RELD SZ 0 L8IN GRN ABSRB FOR ENDO SILS STIT DEVS

## (undated) DEVICE — BITEBLOCK ENDOSCP 60FR MAXI WHT POLYETH STURDY W/ VELC WVN

## (undated) DEVICE — ELECTRO LUBE IS A SINGLE PATIENT USE DEVICE THAT IS INTENDED TO BE USED ON ELECTROSURGICAL ELECTRODES TO REDUCE STICKING.: Brand: KEY SURGICAL ELECTRO LUBE

## (undated) DEVICE — TROCAR ENDOSCP L100MM DIA5MM BLDELSS STBL SL THRD OPT VW

## (undated) DEVICE — SOLUTION IRRIG 3000ML 0.9% SOD CHL USP UROMATIC PLAS CONT

## (undated) DEVICE — GLOVE SURG SZ 8 L12IN FNGR THK79MIL GRN LTX FREE

## (undated) DEVICE — CATHETER IV 22GA L1IN OD0.8382-0.9144MM ID0.6096-0.6858MM 382523

## (undated) DEVICE — IV STRT KT 3282] LSL INDUSTRIES INC]

## (undated) DEVICE — PAD,SANITARY,11 IN,MAXI,N-STRL,IND WRAP: Brand: MEDLINE

## (undated) DEVICE — SHEET TRNSF DISPOSABLE HOVERMATT 100 PER CA

## (undated) DEVICE — TRAY PREP DRY W/ PREM GLV 2 APPL 6 SPNG 2 UNDPD 1 OVERWRAP

## (undated) DEVICE — SPONGE GZ W4XL4IN COT RADPQ HIGHLY ABSRB

## (undated) DEVICE — SUTURING DEVICE: Brand: ENDO STITCH

## (undated) DEVICE — SYRINGE MED 30ML STD CLR PLAS LUERLOCK TIP N CTRL DISP

## (undated) DEVICE — MARKER,SKIN,WI/RULER AND LABELS: Brand: MEDLINE

## (undated) DEVICE — GLOVE ORANGE PI 7   MSG9070

## (undated) DEVICE — AIR SHEET,LAT,COMFORT GLIDE, BLEND 40X80: Brand: MEDLINE

## (undated) DEVICE — SUTURE SZ 0 27IN 5/8 CIR UR-6  TAPER PT VIOLET ABSRB VICRYL J603H

## (undated) DEVICE — SHEET,DRAPE,UNDERBUTTOCK,GRAD POUCH,PORT: Brand: MEDLINE

## (undated) DEVICE — PENCIL SMK EVAC ALL IN 1 DSGN ENH VISIBILITY IMPROVED AIR

## (undated) DEVICE — TROCAR ENDOSCP L100MM DIA5MM BLDELSS STBL SL OBT RADLUC

## (undated) DEVICE — ARM DRAPE

## (undated) DEVICE — LIQUIBAND RAPID ADHESIVE 36/CS 0.8ML: Brand: MEDLINE

## (undated) DEVICE — PLASTICS CHEST BREAST ASU: Brand: MEDLINE INDUSTRIES, INC.

## (undated) DEVICE — BLUNTFILL WITH FILTER: Brand: MONOJECT

## (undated) DEVICE — HYPODERMIC SAFETY NEEDLE: Brand: MAGELLAN

## (undated) DEVICE — SYSTEM EVAC SMOKE LAPARSCOPIC

## (undated) DEVICE — GLOVE SURG SZ 65 L12IN FNGR THK94MIL STD WHT LTX FREE

## (undated) DEVICE — 3M™ CUROS™ DISINFECTING CAP FOR NEEDLELESS CONNECTORS 270/CARTON 20 CARTONS/CASE CFF1-270: Brand: CUROS™

## (undated) DEVICE — SUTURE PDS + SZ 0 L27IN ABSRB VLT L36MM CT 1 1 2 CIR PDP340H

## (undated) DEVICE — BLADE ES L4IN INSUL EDGE

## (undated) DEVICE — SEALER TISS L37CM SHFT DIA5MM 360DEG ROT CVD JAW TAPR TIP

## (undated) DEVICE — BLADE ASSEMB CLP HAIR FINE --

## (undated) DEVICE — GENERAL LAPAROSCOPY - SMH: Brand: MEDLINE INDUSTRIES, INC.

## (undated) DEVICE — GYN LAPAROSCOPY - SMH: Brand: MEDLINE INDUSTRIES, INC.

## (undated) DEVICE — SOLUTION IRRIG 1000ML 0.9% SOD CHL USP POUR PLAS BTL

## (undated) DEVICE — SCISSORS ENDOSCP DIA5MM CRV MPLR CAUT W/ RATCH HNDL

## (undated) DEVICE — SET ADMIN 16ML TBNG L100IN 2 Y INJ SITE IV PIGGY BK DISP (ORDER IN MULIPLES OF 48)

## (undated) DEVICE — FLUID MGMT SYS FLUENT KIT 6/PK

## (undated) DEVICE — DEVICE TISS RETRV 3MMX25.25IN -- MYOSURE

## (undated) DEVICE — COVER US PRB W5XL96IN LTX W/ GEL

## (undated) DEVICE — GAMMEX® NON-LATEX PI ORTHO SIZE 7.5, STERILE POLYISOPRENE POWDER-FREE SURGICAL GLOVE: Brand: GAMMEX

## (undated) DEVICE — GOWN,AURORA,NON-REINFORCED,2XL: Brand: MEDLINE

## (undated) DEVICE — 1200 GUARD II KIT W/5MM TUBE W/O VAC TUBE: Brand: GUARDIAN

## (undated) DEVICE — SYR 10ML LUER LOK 1/5ML GRAD --

## (undated) DEVICE — Device

## (undated) DEVICE — LEGGINGS, PAIR, 31X48, STERILE: Brand: MEDLINE

## (undated) DEVICE — BLADELESS OBTURATOR: Brand: WECK VISTA

## (undated) DEVICE — SUTURE MCRYL SZ 4-0 L27IN ABSRB UD L19MM PS-2 1/2 CIR PRIM Y426H

## (undated) DEVICE — GLOVE SURG SZ 75 L12IN FNGR THK79MIL GRN LTX FREE

## (undated) DEVICE — SUTURE VCRL SZ 3-0 L27IN ABSRB UD L26MM SH 1/2 CIR J416H

## (undated) DEVICE — SEAL

## (undated) DEVICE — Device: Brand: JELCO

## (undated) DEVICE — ELECTRODE,RADIOTRANSLUCENT,FOAM,3PK: Brand: MEDLINE

## (undated) DEVICE — SOLIDIFIER FLD 2OZ 1500CC N DISINF IN BTL DISP SAFESORB

## (undated) DEVICE — PACK,BASIC,SIRUS,V: Brand: MEDLINE

## (undated) DEVICE — NEEDLE SPNL 22GA L3.5IN BLK HUB S STL REG WALL FIT STYL

## (undated) DEVICE — VCARE MEDIUM, UTERINE MANIPULATOR, VAGINAL-CERVICAL-AHLUWALIA'S-RETRACTOR-ELEVATOR: Brand: VCARE

## (undated) DEVICE — PAD PT POS 36 IN SURGYPAD DISP

## (undated) DEVICE — CATHETER,URETHRAL,REDRUBBER,STRL,16FR: Brand: MEDLINE

## (undated) DEVICE — SYRINGE MED 5ML STD CLR PLAS LUERLOCK TIP N CTRL DISP

## (undated) DEVICE — INTENDED FOR TISSUE SEPARATION, AND OTHER PROCEDURES THAT REQUIRE A SHARP SURGICAL BLADE TO PUNCTURE OR CUT.: Brand: BARD-PARKER ® CARBON RIB-BACK BLADES

## (undated) DEVICE — SOLUTION ANTIFOG VIS SYS CLEARIFY LAPSCP

## (undated) DEVICE — BASIN ST MAJOR-NO CAUTERY: Brand: MEDLINE INDUSTRIES, INC.

## (undated) DEVICE — SYRINGE MEDICAL 3ML CLEAR PLASTIC STANDARD NON CONTROL LUERLOCK TIP DISPOSABLE

## (undated) DEVICE — CANNULA CUSH AD W/ 14FT TBG

## (undated) DEVICE — BASIC SINGLE BASIN BTC-LF: Brand: MEDLINE INDUSTRIES, INC.

## (undated) DEVICE — GLOVE ORANGE PI 7 1/2   MSG9075

## (undated) DEVICE — SET SEALS HYSTEROSCOPE DISP -- MYOSURE  EA=10

## (undated) DEVICE — X-RAY DETECTABLE SPONGES,16 PLY: Brand: VISTEC